# Patient Record
Sex: MALE | Race: WHITE | NOT HISPANIC OR LATINO | Employment: OTHER | URBAN - NONMETROPOLITAN AREA
[De-identification: names, ages, dates, MRNs, and addresses within clinical notes are randomized per-mention and may not be internally consistent; named-entity substitution may affect disease eponyms.]

---

## 2017-01-23 ENCOUNTER — AMBULATORY - GICH (OUTPATIENT)
Dept: INTERNAL MEDICINE | Facility: OTHER | Age: 67
End: 2017-01-23

## 2017-01-23 ENCOUNTER — HISTORY (OUTPATIENT)
Dept: INTERNAL MEDICINE | Facility: OTHER | Age: 67
End: 2017-01-23

## 2017-01-23 DIAGNOSIS — Z01.818 ENCOUNTER FOR OTHER PREPROCEDURAL EXAMINATION: ICD-10-CM

## 2017-01-23 DIAGNOSIS — R97.20 ELEVATED PROSTATE SPECIFIC ANTIGEN (PSA): ICD-10-CM

## 2017-01-23 DIAGNOSIS — M25.561 PAIN IN RIGHT KNEE: ICD-10-CM

## 2017-01-23 DIAGNOSIS — M25.562 PAIN IN LEFT KNEE: ICD-10-CM

## 2017-01-23 DIAGNOSIS — F17.200 NICOTINE DEPENDENCE, UNCOMPLICATED: ICD-10-CM

## 2017-01-23 LAB
ABSOLUTE BASOPHILS - HISTORICAL: 0.1 THOU/CU MM
ABSOLUTE EOSINOPHILS - HISTORICAL: 0.1 THOU/CU MM
ABSOLUTE LYMPHOCYTES - HISTORICAL: 1.6 THOU/CU MM (ref 0.9–2.9)
ABSOLUTE MONOCYTES - HISTORICAL: 0.5 THOU/CU MM
ABSOLUTE NEUTROPHILS - HISTORICAL: 2.4 THOU/CU MM (ref 1.7–7)
ANION GAP - HISTORICAL: 9 (ref 5–18)
BACTERIA URINE: NORMAL BACTERIA/HPF
BASOPHILS # BLD AUTO: 1.5 %
BILIRUB UR QL: NEGATIVE
BUN SERPL-MCNC: 20 MG/DL (ref 7–25)
BUN/CREAT RATIO - HISTORICAL: 16
CALCIUM SERPL-MCNC: 9.3 MG/DL (ref 8.6–10.3)
CHLORIDE SERPLBLD-SCNC: 106 MMOL/L (ref 98–107)
CLARITY, URINE: CLEAR CLARITY
CO2 SERPL-SCNC: 26 MMOL/L (ref 21–31)
COLOR UR: YELLOW COLOR
CREAT SERPL-MCNC: 1.22 MG/DL (ref 0.7–1.3)
EOSINOPHIL NFR BLD AUTO: 2.8 %
EPITHELIAL CELLS: NORMAL EPI/HPF
ERYTHROCYTE [DISTWIDTH] IN BLOOD BY AUTOMATED COUNT: 11.4 % (ref 11.5–15.5)
GFR IF NOT AFRICAN AMERICAN - HISTORICAL: 59 ML/MIN/1.73M2
GLUCOSE SERPL-MCNC: 92 MG/DL (ref 70–105)
GLUCOSE URINE: NEGATIVE MG/DL
HCT VFR BLD AUTO: 49.1 % (ref 37–53)
HEMOGLOBIN: 16.5 G/DL (ref 13.5–17.5)
INR - HISTORICAL: 1
KETONES UR QL: NEGATIVE MG/DL
LEUKOCYTE ESTERASE URINE: NEGATIVE
LYMPHOCYTES NFR BLD AUTO: 34.5 % (ref 20–44)
MCH RBC QN AUTO: 31.3 PG (ref 26–34)
MCHC RBC AUTO-ENTMCNC: 33.6 G/DL (ref 32–36)
MCV RBC AUTO: 93 FL (ref 80–100)
MONOCYTES NFR BLD AUTO: 10.7 %
NEUTROPHILS NFR BLD AUTO: 50.5 % (ref 42–72)
NITRITE UR QL STRIP: NEGATIVE
OCCULT BLOOD,URINE - HISTORICAL: ABNORMAL
PH UR: 5.5 [PH]
PLATELET # BLD AUTO: 228 THOU/CU MM (ref 140–440)
PMV BLD: 7 FL (ref 6.5–11)
POTASSIUM SERPL-SCNC: 4.4 MMOL/L (ref 3.5–5.1)
PROTEIN QUALITATIVE,URINE - HISTORICAL: NEGATIVE MG/DL
PROTIME - HISTORICAL: 10.5 SEC (ref 11.9–15.2)
RBC - HISTORICAL: NORMAL /HPF
RED BLOOD COUNT - HISTORICAL: 5.28 MIL/CU MM (ref 4.3–5.9)
SODIUM SERPL-SCNC: 141 MMOL/L (ref 133–143)
SP GR UR STRIP: 1.01
UROBILINOGEN,QUALITATIVE - HISTORICAL: NORMAL EU/DL
WBC - HISTORICAL: NORMAL /HPF
WHITE BLOOD COUNT - HISTORICAL: 4.7 THOU/CU MM (ref 4.5–11)

## 2017-02-16 ENCOUNTER — AMBULATORY - GICH (OUTPATIENT)
Dept: SCHEDULING | Facility: OTHER | Age: 67
End: 2017-02-16

## 2017-02-18 ENCOUNTER — AMBULATORY - GICH (OUTPATIENT)
Dept: SCHEDULING | Facility: OTHER | Age: 67
End: 2017-02-18

## 2017-02-26 ENCOUNTER — HISTORY (OUTPATIENT)
Dept: EMERGENCY MEDICINE | Facility: OTHER | Age: 67
End: 2017-02-26

## 2017-05-31 ENCOUNTER — HISTORY (OUTPATIENT)
Dept: FAMILY MEDICINE | Facility: OTHER | Age: 67
End: 2017-05-31

## 2017-05-31 ENCOUNTER — OFFICE VISIT - GICH (OUTPATIENT)
Dept: FAMILY MEDICINE | Facility: OTHER | Age: 67
End: 2017-05-31
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
## 2017-05-31 ASSESSMENT — PATIENT HEALTH QUESTIONNAIRE - PHQ9: SUM OF ALL RESPONSES TO PHQ QUESTIONS 1-9: 0

## 2017-10-18 ENCOUNTER — HISTORY (OUTPATIENT)
Dept: MEDSURG UNIT | Facility: OTHER | Age: 67
End: 2017-10-18

## 2017-10-20 ENCOUNTER — HISTORY (OUTPATIENT)
Dept: MEDSURG UNIT | Facility: OTHER | Age: 67
End: 2017-10-20

## 2017-11-08 ENCOUNTER — HISTORY (OUTPATIENT)
Dept: INTERNAL MEDICINE | Facility: OTHER | Age: 67
End: 2017-11-08

## 2017-11-08 ENCOUNTER — OFFICE VISIT - GICH (OUTPATIENT)
Dept: INTERNAL MEDICINE | Facility: OTHER | Age: 67
End: 2017-11-08

## 2017-11-08 DIAGNOSIS — Z98.890 OTHER SPECIFIED POSTPROCEDURAL STATES: ICD-10-CM

## 2017-11-08 DIAGNOSIS — N41.9 INFLAMMATORY DISEASE OF PROSTATE: ICD-10-CM

## 2017-11-08 DIAGNOSIS — A41.51 SEPSIS DUE TO ESCHERICHIA COLI (E. COLI) (H): ICD-10-CM

## 2017-11-08 ASSESSMENT — PATIENT HEALTH QUESTIONNAIRE - PHQ9: SUM OF ALL RESPONSES TO PHQ QUESTIONS 1-9: 0

## 2017-12-28 NOTE — PROGRESS NOTES
Patient Information     Patient Name MRN Sex Ammon Marshall [id number] Male 1950      Progress Notes by Chemo Ramos MD at 2017 11:00 AM     Author:  Chemo Ramos MD Service:  (none) Author Type:  Physician     Filed:  2017 11:23 AM Encounter Date:  2017 Status:  Signed     :  Chemo Ramos MD (Physician)            SUBJECTIVE:    Ammon Harding is a 66 y.o. male who presents for recheck after hospitalization.    HPI Comments: He is here today for hospital follow-up. He had an elevated PSA and [facility] Fortunately, the biopsy was negative for tumor. Following the biopsy he developed riders with a temperature up to 104. He was evaluated in the emergency room here and was deemed septic. He was admitted to the hospital and started on IV antibiotics. He had urine and blood cultures that were positive for a pansensitive Escherichia coli. He was resuscitated and ultimately discharged on Levaquin for another 6 days. He is here today for follow-up. His urine at discharge showed some red cells but no evidence for infection. His CBC and his basic metabolic panel at discharge basically returned to normal as well. His other medications were unchanged. The antibiotic is now done. He feels great and he has no complaints or concerns. He does not need anything further done in regards to prostate at this time. He is here today for a check to make sure that everything is okay and nothing further needs to be done.      No Known Allergies,   Current Outpatient Prescriptions     Medication  Sig     acetaminophen (TYLENOL EXTRA STRGTH) 500 mg tablet Take 1,000 mg by mouth every 6 hours if needed. Max acetaminophen dose: 4000mg in 24 hrs.     ibuprofen (ADVIL; MOTRIN) 800 mg tablet Take 800 mg by mouth once daily. Indications: Pain, Do not take more 2 tablets in 24 hours     losartan (COZAAR) 12.5 mg as half tablet Take 12.5 mg by mouth once daily.     sodium  "chloride (OCEAN NASAL SPRAY) 0.65 % nasal solution Inhale 2 Sprays in the nostril(s) 3 times daily.     No current facility-administered medications for this visit.      Medications have been reviewed by me and are current to the best of my knowledge and ability. ,   Past Medical History:     Diagnosis  Date     Elevated PSA 2106    psa 5.1      Knee pain    ,   Patient Active Problem List       Diagnosis  Date Noted     Sepsis (HC)  10/18/2017     Prostate infection  10/18/2017     History of prostate biopsy  10/18/2017     Elevated PSA       psa 5.1        Tinnitus  08/31/2015     Low back pain with sciatica  01/08/2014     KNEE PAIN  04/24/2012     TOBACCO USER       GANGLION CYST       left foot        ,   Past Surgical History:      Procedure  Laterality Date     [facility]       KNEE ARTHROSCOPY  5/2012    Left       PROSTATE BIOPSY  10/2017    Negative       TONSILLECTOMY       VASECTOMY      and   Social History        Substance Use Topics          Smoking status:   Current Every Day Smoker      Types:  Pipe      Smokeless tobacco:   Never Used       Comment: refused       Alcohol use   Yes      Comment: Occasional         REVIEW OF SYSTEMS:  Review of Systems   All other systems reviewed and are negative.      OBJECTIVE:  /88  Pulse 72  Ht 1.715 m (5' 7.5\")  Wt 90.7 kg (200 lb)  BMI 30.86 kg/m2    EXAM:   Physical Exam   Constitutional: He is well-developed, well-nourished, and in no distress. No distress.   Neck: Normal range of motion. Neck supple.   Cardiovascular: Normal rate, regular rhythm and normal heart sounds.  Exam reveals no gallop and no friction rub.    No murmur heard.  Pulmonary/Chest: Effort normal and breath sounds normal. No respiratory distress. He has no wheezes. He has no rales.   Neurological: He is alert.   Skin: Skin is warm and dry. He is not diaphoretic.   Psychiatric: Affect normal.   Nursing note and vitals reviewed.      ASSESSMENT/PLAN:    ICD-10-CM  "   1. Sepsis due to Escherichia coli (HC) A41.51    2. Prostate infection N41.9    3. History of prostate biopsy Z98.890         Plan:  He appears to be back to baseline at this point in time. I don't think that further lab needs to be done as he is feeling absolutely fine and his lab prior to discharge was acceptable. Current medications will continue without change. He will follow-up on an as-needed basis. [facility] will [facility] through the [facility].

## 2017-12-30 NOTE — NURSING NOTE
Patient Information     Patient Name MRAmmon Cheney [id number] Male 1950      Nursing Note by Marleen Shine LPN at 2017 11:00 AM     Author:  Marleen Shine LPN Service:  (none) Author Type:  NURS- Licensed Practical Nurse     Filed:  2017 11:09 AM Encounter Date:  2017 Status:  Signed     :  Marleen Shine LPN (NURS- Licensed Practical Nurse)            The patient is here today to be seen for a hospital follow up.  Marleen Shine LPN......2017  10:53 AM

## 2018-01-03 NOTE — H&P
Patient Information     Patient Name MRN Ammon Brewster [id number] Male 1950      H&P by Chemo Ramos MD at 2017 10:00 AM     Author:  Chemo Ramos MD Service:  (none) Author Type:  Physician     Filed:  2017 11:08 AM Encounter Date:  2017 Status:  Signed     :  Chemo Ramos MD (Physician)            SUBJECTIVE:    Ammon Harding is a 66 y.o. male who presents for preop consultation and clearance.    HPI Comments: This patient is here today for preoperative consultation and clearance is requested by Dr. [place]. He will be having a right total knee replacement done on  due to persistent and progressive osteoarthritis. This will be done at [facility] in [place].    The patient's past medical history is really quite benign and is reviewed below. He has never had any significant surgeries in the past but he has also never had any anesthesia complications. He has never had a blood transfusion. He does not have a history of any bleeding diathesis. He does not have sleep apnea.    The patient remains quite active and does not have any cardiac or pulmonary symptoms. He has no other complaints or concerns and this includes no acute illness or problem. His only current medications include ibuprofen generally once daily for his osteoarthritis as well as a nasal [facility]. He doesn't remember the name of the nasal spray. He does have a history of a mildly elevated PSA but the last check on this at the end of 2016 revealed that it had decreased to 3.7 after being 5.1 [facility]. He will need to have this rechecked again in April.      No Known Allergies,   Current Outpatient Prescriptions     Medication  Sig     acetaZOLAMIDE (DIAMOX) 250 mg tablet Take 1 tablet by mouth 2 times daily. Start 2 days prior to altitude     ibuprofen (ADVIL; MOTRIN) 800 mg tablet Take 1 tablet by mouth every 8 hours if needed.     No  current facility-administered medications for this visit.      Medications have been reviewed by me and are current to the best of my knowledge and ability. ,   Past Medical History      Diagnosis   Date     Elevated PSA       psa 5.1      Knee pain     ,   Patient Active Problem List       Diagnosis  Date Noted     Elevated PSA       psa 5.1        Tinnitus  2015     Low back pain with sciatica  2014     KNEE PAIN  2012     TOBACCO USER       GANGLION CYST       left foot        ,   Past Surgical History       Procedure   Laterality Date     Knee arthroscopy   2012     Left       Tonsillectomy        Vasectomy        [facility]      [facility]   Social History        Substance Use Topics          Smoking status:   Current Every Day Smoker      Types:  Pipe      Smokeless tobacco:   Never Used      Alcohol use   Yes      Comment: Occasional       Family Status     Relation  Status     Father      Mother Alive     Brother Alive     Other      Social History     Social History        Marital status:       Spouse name: N/A     Number of children:  N/A     Years of education:  N/A     Social History Main Topics          Smoking status:   Current Every Day Smoker      Types:  Pipe      Smokeless tobacco:   Never Used      Alcohol use   Yes      Comment: Occasional       Drug use:   No      Sexual activity:   Not Asked      Other Topics  Concern     None      Social History Narrative     He is .  He was  twice and .  He is a pipe smoker with occasional use of alcohol.  He was in the [employer] and was stationed a year in [place] and a year in [place].  He has been a .  He did go to [employer] school.  Retired from [employer]. Lives in [place].       REVIEW OF SYSTEMS:  Review of Systems   Constitutional: Negative for chills, diaphoresis, fever, malaise/fatigue and weight loss.   HENT: Negative for congestion, ear pain, nosebleeds, sore  "throat and tinnitus.    Eyes: Negative for blurred vision, double vision, photophobia, pain, discharge and redness.   Respiratory: Negative for cough, hemoptysis, sputum production, shortness of breath and wheezing.    Cardiovascular: Negative for chest pain, palpitations, orthopnea, claudication, leg swelling and PND.   Gastrointestinal: Negative for abdominal pain, blood in stool, constipation, diarrhea, heartburn, nausea and vomiting.   Genitourinary: Negative for dysuria, flank pain and hematuria.   Musculoskeletal: Negative for back pain, joint pain, myalgias and neck pain.   Skin: Negative for itching and rash.   Neurological: Negative for dizziness, tingling, tremors, speech change, loss of consciousness, weakness and headaches.   Psychiatric/Behavioral: Negative for depression, hallucinations, memory loss, substance abuse and suicidal ideas. The patient is not nervous/anxious.        OBJECTIVE:  /88  Pulse 64  Ht 1.715 m (5' 7.5\")  Wt 94.3 kg (207 lb 12.8 oz)  SpO2 94%  BMI 32.07 kg/m2    EXAM:   Physical Exam   Constitutional: He is oriented to person, place, and time and well-developed, well-nourished, and in no distress. No distress.   HENT:   Head: Normocephalic and atraumatic.   Right Ear: Tympanic membrane and external ear normal.   Left Ear: Tympanic membrane and external ear normal.   Nose: Nose normal.   Mouth/Throat: Oropharynx is clear and moist and mucous membranes are normal. No oropharyngeal exudate.   Eyes: Conjunctivae are normal. Pupils are equal, round, and reactive to light. No scleral icterus.   Neck: Normal range of motion. Neck supple. Normal carotid pulses, no hepatojugular reflux and no JVD present. Carotid bruit is not present. No tracheal deviation and no edema present. No thyroid mass and no thyromegaly present.   Cardiovascular: Normal rate, regular rhythm, normal heart sounds and intact distal pulses.  Exam reveals no gallop and no friction rub.    No murmur " heard.  Pulmonary/Chest: Effort normal and breath sounds normal. No respiratory distress. He has no decreased breath sounds. He has no wheezes. He has no rhonchi. He has no rales. He exhibits no tenderness.   Abdominal: Soft. Bowel sounds are normal. He exhibits no distension and no mass. There is no hepatosplenomegaly. There is no tenderness. There is no rebound and no guarding. Hernia confirmed negative in the right inguinal area and confirmed negative in the left inguinal area.   Musculoskeletal: Normal range of motion. He exhibits no edema or tenderness.   Lymphadenopathy:     He has no cervical adenopathy.   Neurological: He is alert and oriented to person, place, and time. He has normal motor skills. He displays normal reflexes. No cranial nerve deficit. He exhibits normal muscle tone. Gait normal. Coordination normal.   Skin: Skin is warm and dry. No rash noted. He is not diaphoretic. No cyanosis or erythema. No pallor. Nails show no clubbing.   Psychiatric: Mood, memory, affect and judgment normal.   Nursing note and vitals reviewed.    Results for orders placed or performed in visit on 01/23/17      BASIC METABOLIC PANEL      Result  Value Ref Range    SODIUM 141 133 - 143 mmol/L    POTASSIUM 4.4 3.5 - 5.1 mmol/L    CHLORIDE 106 98 - 107 mmol/L    CO2,TOTAL 26 21 - 31 mmol/L    ANION GAP 9 5 - 18                    GLUCOSE 92 70 - 105 mg/dL    CALCIUM 9.3 8.6 - 10.3 mg/dL    BUN 20 7 - 25 mg/dL    CREATININE 1.22 0.70 - 1.30 mg/dL    BUN/CREAT RATIO           16                    GFR if African American >60 >60 ml/min/1.73m2    GFR if not African American 59 (L) >60 ml/min/1.73m2   PROTIME-INR      Result  Value Ref Range    INR 1.0 <1.3    PROTIME 10.5 (L) 11.9 - 15.2 sec   CBC WITH AUTO DIFFERENTIAL      Result  Value Ref Range    WHITE BLOOD COUNT         4.7 4.5 - 11.0 thou/cu mm    RED BLOOD COUNT           5.28 4.30 - 5.90 mil/cu mm    HEMOGLOBIN                16.5 13.5 - 17.5 g/dL    HEMATOCRIT                 49.1 37.0 - 53.0 %    MCV                       93 80 - 100 fL    MCH                       31.3 26.0 - 34.0 pg    MCHC                      33.6 32.0 - 36.0 g/dL    RDW                       11.4 (L) 11.5 - 15.5 %    PLATELET COUNT            228 140 - 440 thou/cu mm    MPV                       7.0 6.5 - 11.0 fL    NEUTROPHILS               50.5 42.0 - 72.0 %    LYMPHOCYTES               34.5 20.0 - 44.0 %    MONOCYTES                 10.7 <12.0 %    EOSINOPHILS               2.8 <8.0 %    BASOPHILS                 1.5 <3.0 %    ABSOLUTE NEUTROPHILS      2.4 1.7 - 7.0 thou/cu mm    ABSOLUTE LYMPHOCYTES      1.6 0.9 - 2.9 thou/cu mm    ABSOLUTE MONOCYTES        0.5 <0.9 thou/cu mm    ABSOLUTE EOSINOPHILS      0.1 <0.5 thou/cu mm    ABSOLUTE BASOPHILS        0.1 <0.3 thou/cu mm   URINALYSIS W REFLEX MICROSCOPIC IF POSITIVE      Result  Value Ref Range    COLOR                     Yellow Yellow Color    CLARITY                   Clear Clear Clarity    SPECIFIC GRAVITY,URINE    1.010 1.010, 1.015, 1.020, 1.025                    PH,URINE                  5.5 6.0, 7.0, 8.0, 5.5, 6.5, 7.5, 8.5                    UROBILINOGEN,QUALITATIVE  Normal Normal EU/dl    PROTEIN, URINE Negative Negative mg/dL    GLUCOSE, URINE Negative Negative mg/dL    KETONES,URINE             Negative Negative mg/dL    BILIRUBIN,URINE           Negative Negative                    OCCULT BLOOD,URINE        Trace (A) Negative                    NITRITE                   Negative Negative                    LEUKOCYTE ESTERASE        Negative Negative                   URINALYSIS MICROSCOPIC      Result  Value Ref Range    RBC None Seen 0-2, None Seen /HPF    WBC None Seen 0-2, 3-5, None Seen /HPF    BACTERIA                  None Seen None Seen, Rare, Occasional, Few Bacteria/HPF    EPITHELIAL CELLS          None Seen None Seen, Few Epi/HPF     his EKG showed a sinus rhythm with a right bundle branch block. No previous for  comparison.    ASSESSMENT/PLAN:    ICD-10-CM    1. Arthralgia of both lower legs M25.561      M25.562    2. TOBACCO USER F17.200    3. Elevated PSA R97.20    4. Preop examination Z01.818 EKG 12 LEAD UNIT PERFORMED      BASIC METABOLIC PANEL      CBC AND DIFFERENTIAL      URINALYSIS W REFLEX MICROSCOPIC IF POSITIVE      PROTIME-INR      WV ELECTROCARDIOGRAM TRACING      BASIC METABOLIC PANEL      CBC AND DIFFERENTIAL      PROTIME-INR      CBC WITH AUTO DIFFERENTIAL      URINALYSIS W REFLEX MICROSCOPIC IF POSITIVE      URINALYSIS MICROSCOPIC      URINALYSIS MICROSCOPIC        Plan:  His exam today is normal. Lab is acceptable. EKG shows a sinus rhythm with a right bundle branch block, otherwise normal. No previous to compare to. This patient is okay for his planned surgical procedure without contraindication. I did ask him to avoid all aspirin and anti-inflammatory medications for 1 week prior to the procedure.

## 2018-01-03 NOTE — PROGRESS NOTES
Patient Information     Patient Name MRN Ammon Brewster [id number] Male 1950      Progress Notes by Chemo Ramos MD at 2017 10:00 AM     Author:  Chemo Ramos MD Service:  (none) Author Type:  Physician     Filed:  2017 11:08 AM Encounter Date:  2017 Status:  Signed     :  Chemo Ramos MD (Physician)            SUBJECTIVE:    Ammon Harding is a 66 y.o. male who presents for preop consultation and clearance.    HPI Comments: This patient is here today for preoperative consultation and clearance is requested by Dr. Prescott. He will be having a right total knee replacement done on  due to persistent and progressive osteoarthritis. This will be done at [facility] in [place].    The patient's past medical history is really quite benign and is reviewed below. He has never had any significant surgeries in the past but he has also never had any anesthesia complications. He has never had a blood transfusion. He does not have a history of any bleeding diathesis. He does not have sleep apnea.    The patient remains quite active and does not have any cardiac or pulmonary symptoms. He has no other complaints or concerns and this includes no acute illness or problem. His only current medications include ibuprofen generally once daily for his osteoarthritis as well as [facility] He doesn't remember the name of the nasal spray. He does have a history of a mildly elevated PSA but the last check on this at the end of 2016 revealed that it had decreased to 3.7 after [facility]. He will need to have this rechecked again in April.      No Known Allergies,   Current Outpatient Prescriptions     Medication  Sig     acetaZOLAMIDE (DIAMOX) 250 mg tablet Take 1 tablet by mouth 2 times daily. Start 2 days prior to altitude     ibuprofen (ADVIL; MOTRIN) 800 mg tablet Take 1 tablet by mouth every 8 hours if  needed.     No current facility-administered medications for this visit.      Medications have been reviewed by me and are current to the best of my knowledge and ability. ,   Past Medical History      Diagnosis   Date     Elevated PSA       psa 5.1      Knee pain     ,   Patient Active Problem List       Diagnosis  Date Noted     Elevated PSA       psa 5.1        Tinnitus  2015     Low back pain with sciatica  2014     KNEE PAIN  2012     TOBACCO USER       GANGLION CYST       left foot        ,   Past Surgical History       Procedure   Laterality Date     Knee arthroscopy   2012     Left       Tonsillectomy        Vasectomy        [facility]   Social History        Substance Use Topics          Smoking status:   Current Every Day Smoker      Types:  Pipe      Smokeless tobacco:   Never Used      Alcohol use   Yes      Comment: Occasional       Family Status     Relation  Status     Father      Mother Alive     Brother Alive     Other      Social History     Social History        Marital status:       Spouse name: N/A     Number of children:  N/A     Years of education:  N/A     Social History Main Topics          Smoking status:   Current Every Day Smoker      Types:  Pipe      Smokeless tobacco:   Never Used      Alcohol use   Yes      Comment: Occasional       Drug use:   No      Sexual activity:   Not Asked      Other Topics  Concern     None      Social History Narrative     He is .  He was  twice and .  He is a pipe smoker with occasional use of alcohol.  He was in the [employer] and was stationed a year in [employer] and a year in [employer].  He has been a .  He did go to [employer] school.  Retired from [employer]. Lives in [place].       REVIEW OF SYSTEMS:  Review of Systems   Constitutional: Negative for chills, diaphoresis, fever, malaise/fatigue and weight loss.   HENT: Negative for congestion, ear pain,  "nosebleeds, sore throat and tinnitus.    Eyes: Negative for blurred vision, double vision, photophobia, pain, discharge and redness.   Respiratory: Negative for cough, hemoptysis, sputum production, shortness of breath and wheezing.    Cardiovascular: Negative for chest pain, palpitations, orthopnea, claudication, leg swelling and PND.   Gastrointestinal: Negative for abdominal pain, blood in stool, constipation, diarrhea, heartburn, nausea and vomiting.   Genitourinary: Negative for dysuria, flank pain and hematuria.   Musculoskeletal: Negative for back pain, joint pain, myalgias and neck pain.   Skin: Negative for itching and rash.   Neurological: Negative for dizziness, tingling, tremors, speech change, loss of consciousness, weakness and headaches.   Psychiatric/Behavioral: Negative for depression, hallucinations, memory loss, substance abuse and suicidal ideas. The patient is not nervous/anxious.        OBJECTIVE:  /88  Pulse 64  Ht 1.715 m (5' 7.5\")  Wt 94.3 kg (207 lb 12.8 oz)  SpO2 94%  BMI 32.07 kg/m2    EXAM:   Physical Exam   Constitutional: He is oriented to person, place, and time and well-developed, well-nourished, and in no distress. No distress.   HENT:   Head: Normocephalic and atraumatic.   Right Ear: Tympanic membrane and external ear normal.   Left Ear: Tympanic membrane and external ear normal.   Nose: Nose normal.   Mouth/Throat: Oropharynx is clear and moist and mucous membranes are normal. No oropharyngeal exudate.   Eyes: Conjunctivae are normal. Pupils are equal, round, and reactive to light. No scleral icterus.   Neck: Normal range of motion. Neck supple. Normal carotid pulses, no hepatojugular reflux and no JVD present. Carotid bruit is not present. No tracheal deviation and no edema present. No thyroid mass and no thyromegaly present.   Cardiovascular: Normal rate, regular rhythm, normal heart sounds and intact distal pulses.  Exam reveals no gallop and no friction rub.    No " murmur heard.  Pulmonary/Chest: Effort normal and breath sounds normal. No respiratory distress. He has no decreased breath sounds. He has no wheezes. He has no rhonchi. He has no rales. He exhibits no tenderness.   Abdominal: Soft. Bowel sounds are normal. He exhibits no distension and no mass. There is no hepatosplenomegaly. There is no tenderness. There is no rebound and no guarding. Hernia confirmed negative in the right inguinal area and confirmed negative in the left inguinal area.   Musculoskeletal: Normal range of motion. He exhibits no edema or tenderness.   Lymphadenopathy:     He has no cervical adenopathy.   Neurological: He is alert and oriented to person, place, and time. He has normal motor skills. He displays normal reflexes. No cranial nerve deficit. He exhibits normal muscle tone. Gait normal. Coordination normal.   Skin: Skin is warm and dry. No rash noted. He is not diaphoretic. No cyanosis or erythema. No pallor. Nails show no clubbing.   Psychiatric: Mood, memory, affect and judgment normal.   Nursing note and vitals reviewed.    Results for orders placed or performed in visit on 01/23/17      BASIC METABOLIC PANEL      Result  Value Ref Range    SODIUM 141 133 - 143 mmol/L    POTASSIUM 4.4 3.5 - 5.1 mmol/L    CHLORIDE 106 98 - 107 mmol/L    CO2,TOTAL 26 21 - 31 mmol/L    ANION GAP 9 5 - 18                    GLUCOSE 92 70 - 105 mg/dL    CALCIUM 9.3 8.6 - 10.3 mg/dL    BUN 20 7 - 25 mg/dL    CREATININE 1.22 0.70 - 1.30 mg/dL    BUN/CREAT RATIO           16                    GFR if African American >60 >60 ml/min/1.73m2    GFR if not African American 59 (L) >60 ml/min/1.73m2   PROTIME-INR      Result  Value Ref Range    INR 1.0 <1.3    PROTIME 10.5 (L) 11.9 - 15.2 sec   CBC WITH AUTO DIFFERENTIAL      Result  Value Ref Range    WHITE BLOOD COUNT         4.7 4.5 - 11.0 thou/cu mm    RED BLOOD COUNT           5.28 4.30 - 5.90 mil/cu mm    HEMOGLOBIN                16.5 13.5 - 17.5 g/dL     HEMATOCRIT                49.1 37.0 - 53.0 %    MCV                       93 80 - 100 fL    MCH                       31.3 26.0 - 34.0 pg    MCHC                      33.6 32.0 - 36.0 g/dL    RDW                       11.4 (L) 11.5 - 15.5 %    PLATELET COUNT            228 140 - 440 thou/cu mm    MPV                       7.0 6.5 - 11.0 fL    NEUTROPHILS               50.5 42.0 - 72.0 %    LYMPHOCYTES               34.5 20.0 - 44.0 %    MONOCYTES                 10.7 <12.0 %    EOSINOPHILS               2.8 <8.0 %    BASOPHILS                 1.5 <3.0 %    ABSOLUTE NEUTROPHILS      2.4 1.7 - 7.0 thou/cu mm    ABSOLUTE LYMPHOCYTES      1.6 0.9 - 2.9 thou/cu mm    ABSOLUTE MONOCYTES        0.5 <0.9 thou/cu mm    ABSOLUTE EOSINOPHILS      0.1 <0.5 thou/cu mm    ABSOLUTE BASOPHILS        0.1 <0.3 thou/cu mm   URINALYSIS W REFLEX MICROSCOPIC IF POSITIVE      Result  Value Ref Range    COLOR                     Yellow Yellow Color    CLARITY                   Clear Clear Clarity    SPECIFIC GRAVITY,URINE    1.010 1.010, 1.015, 1.020, 1.025                    PH,URINE                  5.5 6.0, 7.0, 8.0, 5.5, 6.5, 7.5, 8.5                    UROBILINOGEN,QUALITATIVE  Normal Normal EU/dl    PROTEIN, URINE Negative Negative mg/dL    GLUCOSE, URINE Negative Negative mg/dL    KETONES,URINE             Negative Negative mg/dL    BILIRUBIN,URINE           Negative Negative                    OCCULT BLOOD,URINE        Trace (A) Negative                    NITRITE                   Negative Negative                    LEUKOCYTE ESTERASE        Negative Negative                   URINALYSIS MICROSCOPIC      Result  Value Ref Range    RBC None Seen 0-2, None Seen /HPF    WBC None Seen 0-2, 3-5, None Seen /HPF    BACTERIA                  None Seen None Seen, Rare, Occasional, Few Bacteria/HPF    EPITHELIAL CELLS          None Seen None Seen, Few Epi/HPF     his EKG showed a sinus rhythm with a right bundle branch block. No previous  for comparison.    ASSESSMENT/PLAN:    ICD-10-CM    1. Arthralgia of both lower legs M25.561      M25.562    2. TOBACCO USER F17.200    3. Elevated PSA R97.20    4. Preop examination Z01.818 EKG 12 LEAD UNIT PERFORMED      BASIC METABOLIC PANEL      CBC AND DIFFERENTIAL      URINALYSIS W REFLEX MICROSCOPIC IF POSITIVE      PROTIME-INR      MT ELECTROCARDIOGRAM TRACING      BASIC METABOLIC PANEL      CBC AND DIFFERENTIAL      PROTIME-INR      CBC WITH AUTO DIFFERENTIAL      URINALYSIS W REFLEX MICROSCOPIC IF POSITIVE      URINALYSIS MICROSCOPIC      URINALYSIS MICROSCOPIC        Plan:  His exam today is normal. Lab is acceptable. EKG shows a sinus rhythm with a right bundle branch block, otherwise normal. No previous to compare to. This patient is okay for his planned surgical procedure without contraindication. I did ask him to avoid all aspirin and anti-inflammatory medications for 1 week prior to the procedure.

## 2018-01-03 NOTE — NURSING NOTE
Patient Information     Patient Name MRN Ammon Brewster [id number] Male 1950      Nursing Note by Marleen Shine LPN at 2017 10:00 AM     Author:  Marleen Shine LPN Service:  (none) Author Type:  NURS- Licensed Practical Nurse     Filed:  2017 10:02 AM Encounter Date:  2017 Status:  Signed     :  Marleen Shine LPN (NURS- Licensed Practical Nurse)            This patient presents today for a Preoperative exam for this procedure: right total knee    Date of Surgery: 2017   Surgeon:  Dr. Prescott  Facility:  [facility]   Marleen Shine LPN..................2017  9:53 AM

## 2018-01-05 NOTE — PROGRESS NOTES
Patient Information     Patient Name MRN Sex Ammon Marshall [id number] Male 1950      Progress Notes by Arthur Stanton MD at 2017 10:15 AM     Author:  Arthur Stanton MD Service:  (none) Author Type:  Physician     Filed:  2017 11:19 AM Encounter Date:  2017 Status:  Signed     :  Arthur Stanton MD (Physician)            SUBJECTIVE:  Tick bite  Ammon Harding is a 66 y.o. male who presents for tick bite    HPI Comments: Patient presents with a tick bite to his left upper bicep. He denies any symptoms such as fevers chills no muscle or joint aches. He did identify it as a deer tick and believes it was on for less than 48 hours.      No Known Allergies,   Family History       Problem   Relation Age of Onset     Heart failure  Father      Dementia  Father      Other  Mother      Dementia       Diabetes  Brother      Heart Disease  Other      In distant relatives     ,   Current Outpatient Prescriptions on File Prior to Visit       Medication  Sig Dispense Refill     ibuprofen (ADVIL; MOTRIN) 800 mg tablet Take 1 tablet by mouth every 8 hours if needed.  0     No current facility-administered medications on file prior to visit.    ,   Past Surgical History:      Procedure  Laterality Date     [facility]       KNEE ARTHROSCOPY  2012    Left       TONSILLECTOMY       VASECTOMY     ,   Social History        Substance Use Topics          Smoking status:   Current Every Day Smoker      Types:  Pipe      Smokeless tobacco:   Never Used      Alcohol use   Yes      Comment: Occasional      and   Social History        Substance Use Topics          Smoking status:   Current Every Day Smoker      Types:  Pipe      Smokeless tobacco:   Never Used      Alcohol use   Yes      Comment: Occasional         REVIEW OF SYSTEMS:  ROS    OBJECTIVE:  /88  Pulse 64  Temp 98  F (36.7  C) (Temporal)  Wt 91 kg (200 lb 9.6 oz)  BMI 30.95 kg/m2    EXAM:   Physical Exam    Constitutional: He is well-developed, well-nourished, and in no distress.   Neurological: He is alert.   Skin: There is erythema (left upper extremity approximately 1 cm).       ASSESSMENT/PLAN:    ICD-10-CM    1. Tick bite, initial encounter W57.XXXA doxycycline (VIBRAMYCIN) 100 mg capsule        Plan:  Discussed options. Low probability of Lyme disease. Doxycycline. 2 tablets as a single dose. Follow-up

## 2018-01-05 NOTE — NURSING NOTE
Patient Information     Patient Name MRAmmon Cheney [id number] Male 1950      Nursing Note by Tracey Saxena at 2017 10:15 AM     Author:  Tracey Saxena Service:  (none) Author Type:  (none)     Filed:  2017 10:21 AM Encounter Date:  2017 Status:  Signed     :  Tracey Saxena            Patient here for a tick bite that he pulled off this morning on the left upper arm. Tracey Saxena LPN .......................2017  10:19 AM

## 2018-01-27 VITALS
HEART RATE: 72 BPM | DIASTOLIC BLOOD PRESSURE: 88 MMHG | SYSTOLIC BLOOD PRESSURE: 124 MMHG | HEIGHT: 68 IN | WEIGHT: 200 LBS | BODY MASS INDEX: 30.31 KG/M2

## 2018-01-27 VITALS
OXYGEN SATURATION: 94 % | SYSTOLIC BLOOD PRESSURE: 126 MMHG | DIASTOLIC BLOOD PRESSURE: 88 MMHG | HEART RATE: 64 BPM | BODY MASS INDEX: 31.49 KG/M2 | WEIGHT: 207.8 LBS | HEIGHT: 68 IN

## 2018-01-27 VITALS
TEMPERATURE: 98 F | WEIGHT: 200.6 LBS | DIASTOLIC BLOOD PRESSURE: 88 MMHG | SYSTOLIC BLOOD PRESSURE: 128 MMHG | BODY MASS INDEX: 30.95 KG/M2 | HEART RATE: 64 BPM

## 2018-02-03 ASSESSMENT — PATIENT HEALTH QUESTIONNAIRE - PHQ9
SUM OF ALL RESPONSES TO PHQ QUESTIONS 1-9: 0
SUM OF ALL RESPONSES TO PHQ QUESTIONS 1-9: 0

## 2018-02-16 ENCOUNTER — DOCUMENTATION ONLY (OUTPATIENT)
Dept: FAMILY MEDICINE | Facility: OTHER | Age: 68
End: 2018-02-16

## 2018-02-16 PROBLEM — R97.20 ELEVATED PSA: Status: ACTIVE | Noted: 2018-02-16

## 2018-02-16 PROBLEM — A41.9 SEPSIS (H): Status: ACTIVE | Noted: 2017-10-18

## 2018-02-16 PROBLEM — M67.40 GANGLION CYST: Status: ACTIVE | Noted: 2018-02-16

## 2018-02-16 PROBLEM — Z72.0 TOBACCO USER: Status: ACTIVE | Noted: 2018-02-16

## 2018-02-16 PROBLEM — N41.9 PROSTATE INFECTION: Status: ACTIVE | Noted: 2017-10-18

## 2018-02-16 PROBLEM — Z98.890 HISTORY OF PROSTATE BIOPSY: Status: ACTIVE | Noted: 2017-10-18

## 2018-02-16 RX ORDER — ACETAMINOPHEN 500 MG
1000 TABLET ORAL EVERY 6 HOURS PRN
COMMUNITY
End: 2018-12-21

## 2018-02-16 RX ORDER — LOSARTAN POTASSIUM 25 MG/1
12.5 TABLET ORAL DAILY
COMMUNITY
End: 2022-12-15 | Stop reason: ALTCHOICE

## 2018-02-16 RX ORDER — IBUPROFEN 800 MG/1
800 TABLET, FILM COATED ORAL DAILY
COMMUNITY
End: 2019-10-16

## 2018-05-01 ENCOUNTER — HOSPITAL ENCOUNTER (OUTPATIENT)
Dept: MRI IMAGING | Facility: OTHER | Age: 68
Discharge: HOME | End: 2018-05-01
Attending: NURSE PRACTITIONER | Admitting: NURSE PRACTITIONER
Payer: COMMERCIAL

## 2018-05-01 DIAGNOSIS — R97.20 ELEVATED PROSTATE SPECIFIC ANTIGEN (PSA): ICD-10-CM

## 2018-05-01 PROCEDURE — A9577 INJ MULTIHANCE: HCPCS | Performed by: RADIOLOGY

## 2018-05-01 PROCEDURE — 72197 MRI PELVIS W/O & W/DYE: CPT

## 2018-05-01 PROCEDURE — 25500064 ZZH RX 255 OP 636: Performed by: RADIOLOGY

## 2018-05-01 RX ADMIN — GADOBENATE DIMEGLUMINE 20 ML: 529 INJECTION, SOLUTION INTRAVENOUS at 11:00

## 2018-05-17 ENCOUNTER — TRANSFERRED RECORDS (OUTPATIENT)
Dept: HEALTH INFORMATION MANAGEMENT | Facility: OTHER | Age: 68
End: 2018-05-17

## 2018-07-23 ENCOUNTER — TRANSFERRED RECORDS (OUTPATIENT)
Dept: HEALTH INFORMATION MANAGEMENT | Facility: OTHER | Age: 68
End: 2018-07-23

## 2018-07-23 NOTE — PROGRESS NOTES
Patient Information     Patient Name  Ammon Harding MRN  [id number] Sex  Male   1950      Letter by Chemo Ramos MD at      Author:  Chemo Ramos MD Service:  (none) Author Type:  (none)    Filed:   Encounter Date:  2017 Status:  (Other)           Ammon Harding  [address] [facility] [id number]          2017    Dear Ammon,    Following are the tests completed during your last clinic visit:    Results for orders placed or performed in visit on 17      BASIC METABOLIC PANEL      Result  Value Ref Range    SODIUM 141 133 - 143 mmol/L    POTASSIUM 4.4 3.5 - 5.1 mmol/L    CHLORIDE 106 98 - 107 mmol/L    CO2,TOTAL 26 21 - 31 mmol/L    ANION GAP 9 5 - 18                    GLUCOSE 92 70 - 105 mg/dL    CALCIUM 9.3 8.6 - 10.3 mg/dL    BUN 20 7 - 25 mg/dL    CREATININE 1.22 0.70 - 1.30 mg/dL    BUN/CREAT RATIO           16                    GFR if African American >60 >60 ml/min/1.73m2    GFR if not African American 59 (L) >60 ml/min/1.73m2   PROTIME-INR      Result  Value Ref Range    INR 1.0 <1.3    PROTIME 10.5 (L) 11.9 - 15.2 sec   CBC WITH AUTO DIFFERENTIAL      Result  Value Ref Range    WHITE BLOOD COUNT         4.7 4.5 - 11.0 thou/cu mm    RED BLOOD COUNT           5.28 4.30 - 5.90 mil/cu mm    HEMOGLOBIN                16.5 13.5 - 17.5 g/dL    HEMATOCRIT                49.1 37.0 - 53.0 %    MCV                       93 80 - 100 fL    MCH                       31.3 26.0 - 34.0 pg    MCHC                      33.6 32.0 - 36.0 g/dL    RDW                       11.4 (L) 11.5 - 15.5 %    PLATELET COUNT            228 140 - 440 thou/cu mm    MPV                       7.0 6.5 - 11.0 fL    NEUTROPHILS               50.5 42.0 - 72.0 %    LYMPHOCYTES               34.5 20.0 - 44.0 %    MONOCYTES                 10.7 <12.0 %    EOSINOPHILS               2.8 <8.0 %    BASOPHILS                 1.5 <3.0 %    ABSOLUTE NEUTROPHILS      2.4 1.7 - 7.0 thou/cu mm    ABSOLUTE  LYMPHOCYTES      1.6 0.9 - 2.9 thou/cu mm    ABSOLUTE MONOCYTES        0.5 <0.9 thou/cu mm    ABSOLUTE EOSINOPHILS      0.1 <0.5 thou/cu mm    ABSOLUTE BASOPHILS        0.1 <0.3 thou/cu mm   URINALYSIS W REFLEX MICROSCOPIC IF POSITIVE      Result  Value Ref Range    COLOR                     Yellow Yellow Color    CLARITY                   Clear Clear Clarity    SPECIFIC GRAVITY,URINE    1.010 1.010, 1.015, 1.020, 1.025                    PH,URINE                  5.5 6.0, 7.0, 8.0, 5.5, 6.5, 7.5, 8.5                    UROBILINOGEN,QUALITATIVE  Normal Normal EU/dl    PROTEIN, URINE Negative Negative mg/dL    GLUCOSE, URINE Negative Negative mg/dL    KETONES,URINE             Negative Negative mg/dL    BILIRUBIN,URINE           Negative Negative                    OCCULT BLOOD,URINE        Trace (A) Negative                    NITRITE                   Negative Negative                    LEUKOCYTE ESTERASE        Negative Negative                   URINALYSIS MICROSCOPIC      Result  Value Ref Range    RBC None Seen 0-2, None Seen /HPF    WBC None Seen 0-2, 3-5, None Seen /HPF    BACTERIA                  None Seen None Seen, Rare, Occasional, Few Bacteria/HPF    EPITHELIAL CELLS          None Seen None Seen, Few Epi/HPF         Your blood and urine tests look fine. Congratulations on this report.    Sincerely,      Chemo Ramos MD  Internal Medicine  [facility]

## 2018-07-30 ENCOUNTER — OFFICE VISIT (OUTPATIENT)
Dept: FAMILY MEDICINE | Facility: OTHER | Age: 68
End: 2018-07-30
Attending: FAMILY MEDICINE
Payer: MEDICARE

## 2018-07-30 VITALS
HEIGHT: 67 IN | BODY MASS INDEX: 30.7 KG/M2 | SYSTOLIC BLOOD PRESSURE: 142 MMHG | WEIGHT: 195.6 LBS | OXYGEN SATURATION: 97 % | HEART RATE: 69 BPM | DIASTOLIC BLOOD PRESSURE: 78 MMHG

## 2018-07-30 DIAGNOSIS — Z01.818 PRE-OP EXAM: Primary | ICD-10-CM

## 2018-07-30 DIAGNOSIS — I10 BENIGN ESSENTIAL HYPERTENSION: ICD-10-CM

## 2018-07-30 DIAGNOSIS — M67.40 GANGLION CYST: ICD-10-CM

## 2018-07-30 LAB
ANION GAP SERPL CALCULATED.3IONS-SCNC: 4 MMOL/L (ref 3–14)
BUN SERPL-MCNC: 17 MG/DL (ref 7–25)
CALCIUM SERPL-MCNC: 9.7 MG/DL (ref 8.6–10.3)
CHLORIDE SERPL-SCNC: 106 MMOL/L (ref 98–107)
CO2 SERPL-SCNC: 30 MMOL/L (ref 21–31)
CREAT SERPL-MCNC: 1.3 MG/DL (ref 0.7–1.3)
GFR SERPL CREATININE-BSD FRML MDRD: 55 ML/MIN/1.7M2
GLUCOSE SERPL-MCNC: 106 MG/DL (ref 70–105)
POTASSIUM SERPL-SCNC: 4.4 MMOL/L (ref 3.5–5.1)
SODIUM SERPL-SCNC: 140 MMOL/L (ref 134–144)

## 2018-07-30 PROCEDURE — 36415 COLL VENOUS BLD VENIPUNCTURE: CPT | Performed by: FAMILY MEDICINE

## 2018-07-30 PROCEDURE — 99214 OFFICE O/P EST MOD 30 MIN: CPT | Mod: 25 | Performed by: FAMILY MEDICINE

## 2018-07-30 PROCEDURE — 93010 ELECTROCARDIOGRAM REPORT: CPT | Performed by: INTERNAL MEDICINE

## 2018-07-30 PROCEDURE — G0463 HOSPITAL OUTPT CLINIC VISIT: HCPCS | Mod: 25

## 2018-07-30 PROCEDURE — G0463 HOSPITAL OUTPT CLINIC VISIT: HCPCS

## 2018-07-30 PROCEDURE — 80048 BASIC METABOLIC PNL TOTAL CA: CPT | Performed by: FAMILY MEDICINE

## 2018-07-30 PROCEDURE — 93005 ELECTROCARDIOGRAM TRACING: CPT | Performed by: FAMILY MEDICINE

## 2018-07-30 ASSESSMENT — PAIN SCALES - GENERAL: PAINLEVEL: NO PAIN (0)

## 2018-07-30 NOTE — PROGRESS NOTES
----------------- PREOPERATIVE EXAM ------------------  7/30/2018    SUBJECTIVE:  Ammon Harding is a 67 year old male here for preoperative optimization.    I was asked to see Ammon Harding by Dr. Owen for preoperative eval    Date of Surgery: 08/06/18  Type of Surgery: toe ganglion  [facility]: [place]    HPI: Patient arrives here for a preop.  He will be undergoing surgery to a ganglion cyst involving his toe right second digit.      Fever/Chills or other infectious symptoms in past month:  no  >10lb weight loss in past two months:  no    Patient Active Problem List    Diagnosis Date Noted     Pre-op exam 07/30/2018     Priority: Medium     Elevated PSA 02/16/2018     Priority: Medium     Overview:   psa 5.1       Ganglion cyst 02/16/2018     Priority: Medium     Overview:   left foot       Tobacco user 02/16/2018     Priority: Medium     History of prostate biopsy 10/18/2017     Priority: Medium     Prostate infection 10/18/2017     Priority: Medium     Sepsis (H) 10/18/2017     Priority: Medium     Tinnitus 08/31/2015     Priority: Medium     Low back pain with sciatica 01/08/2014     Priority: Medium     Knee pain 04/24/2012     Priority: Medium       Past Medical History:   Diagnosis Date     Elevated prostate specific antigen (PSA)     2106,psa 5.1     Pain in knee     No Comments Provided       Past Surgical History:   Procedure Laterality Date     ARTHROSCOPY KNEE      5/2012,Left     [facility]      [facility]     OTHER SURGICAL HISTORY      10/2017,MNP602,PROSTATE BIOPSY,Negative     TONSILLECTOMY      No Comments Provided     VASECTOMY      No Comments Provided       Family History   Problem Relation Age of Onset     Heart Failure Father      Heart failure     Dementia Father      Dementia     Other - See Comments Mother      Dementia     Diabetes Brother      Diabetes     HEART DISEASE Other      Heart Disease,In distant relatives       Social History   Substance Use Topics     Smoking status:  "Current Every Day Smoker     Types: Pipe     Smokeless tobacco: Never Used      Comment: Quit smoking: refused     Alcohol use Yes      Comment: 14 a week        Current Outpatient Prescriptions   Medication Sig Dispense Refill     ibuprofen (ADVIL/MOTRIN) 800 MG tablet Take 800 mg by mouth daily       losartan (COZAAR) 25 MG tablet Take 12.5 mg by mouth daily       Saline (SODIUM CHLORIDE) 0.65 % SOLN Spray 2 sprays in nostril 3 times daily       acetaminophen (TYLENOL) 500 MG tablet Take 1,000 mg by mouth every 6 hours as needed         Allergies:  No Known Allergies    ROS:    Surgical:  patient denies previous complications from prior surgeries including but not limited to prolonged bleeding, anesthesia complications, dysrhythmias, surgical wound infections, or prolonged hospital stay.  Patient does report a prostatic right knee   Trouble waking up from a prostate bx    Denies family hx of bleeding tendencies, anesthesia complications, or other problems with surgery.     -------------------------------------------------------------    PHYSICAL EXAM:  /78 (BP Location: Right arm, Patient Position: Sitting)  Pulse 69  Ht 5' 6.5\" (1.689 m)  Wt 195 lb 9.6 oz (88.7 kg)  SpO2 97%  BMI 31.1 kg/m2    EXAM:  General Appearance: Pleasant, alert, appropriate appearance for age. No acute distress  Head Exam: Normal. Normocephalic, atraumatic.  Eyes: PERRL, EOMI  Ears: Normal TM's bilaterally. Normal auditory canals and external ears.   OroPharynx: Normal buccal mucosa. Normal pharynx.  Neck: Supple, no masses or nodes, no lymphadenopathy.  No thyromegaly.  Lungs: Normal chest wall and respirations. Clear to auscultation, no wheezes or crackles.  Cardiovascular: Regular rate and rhythm. S1, S2, no murmurs.  Gastrointestinal: Soft, nontender, no abnormal masses or organomegaly. BS normal   Musculoskeletal: No edema.  Skin: no concerning or new rashes.  Psychiatric Exam: Alert and oriented, appropriate affect.  His " right second toe over the DIP joint does show a typical ganglion.      EKG:  normal EKG, normal sinus rhythm  ---------------------------------------------------------------  LABS      ASSESSEMENT AND PLAN:    (Z01.818) Pre-op exam  (primary encounter diagnosis)      (M67.40) Ganglion cyst      PRE OP RECOMMENDATIONS:  Patient is on chronic pain medications no  Patient is on antiplatlet/anticoagulation medication no  Other medications that need adjustment perioperatively no    Other:  Patient was advised to call our office and the surgical services with any change in condition or new symptoms if they were to develop between today and their surgical date.  Especially any cardiopulmonary symptoms or symptoms concerning for an infection.    Arthur Stanton 7/30/2018

## 2018-07-30 NOTE — MR AVS SNAPSHOT
"              After Visit Summary   7/30/2018    Ammon Harding    MRN: [id number]           Patient Information     Date Of Birth          1950        Visit Information        Provider Department      7/30/2018 2:00 PM Arthur Stanton MD [facility]        Today's Diagnoses     Pre-op exam    -  1    Ganglion cyst        Benign essential hypertension           Follow-ups after your visit        Who to contact     If you have questions or need follow up information about today's clinic visit or your schedule please contact [facility] [place] directly at [phone].  Normal or non-critical lab and imaging results will be communicated to you by MyChart, letter or phone within 4 business days after the clinic has received the results. If you do not hear from us within 7 days, please contact the clinic through MyChart or phone. If you have a critical or abnormal lab result, we will notify you by phone as soon as possible.  Submit refill requests through [employer] or call your pharmacy and they will forward the refill request to us. Please allow 3 business days for your refill to be completed.          Additional Information About Your Visit        Care EveryWhere ID     This is your Care EveryWhere ID. This could be used by other organizations to access your [facility] medical records  [id number]        Your Vitals Were     Pulse Height Pulse Oximetry BMI (Body Mass Index)          69 5' 6.5\" (1.689 m) 97% 31.1 kg/m2         Blood Pressure from Last 3 Encounters:   07/30/18 142/78   11/08/17 124/88   05/31/17 128/88    Weight from Last 3 Encounters:   07/30/18 195 lb 9.6 oz (88.7 kg)   11/08/17 200 lb (90.7 kg)   05/31/17 200 lb 9.6 oz (91 kg)              We Performed the Following     Basic Metabolic Panel        Primary Care Provider Office Phone # Fax #    Chemo Ramos [phone] [id number]  [facility] [address]        Equal Access to " Services     [facility]: Hadii aad ku hadkaykaylatonia Estefaniashai, wabevda luqadaha, qaybta kaalmadago pendleton, dagoberto esteves . So [facility] [phone].    ATENCIÓN: Si habla español, tiene a ovalle disposición servicios gratuitos de asistencia lingüística. Llame al [phone].    We comply with applicable federal civil rights laws and [place] laws. We do not discriminate on the basis of race, color, national origin, age, disability, sex, sexual orientation, or gender identity.            Thank you!     Thank you for choosing [facility] AND [facility]  for your care. Our goal is always to provide you with excellent care. Hearing back from our patients is one way we can continue to improve our services. Please take a few minutes to complete the written survey that you may receive in the mail after your visit with us. Thank you!             Your Updated Medication List - Protect others around you: Learn how to safely use, store and throw away your medicines at www.disposemymeds.org.          This list is accurate as of 7/30/18 11:59 PM.  Always use your most recent med list.                   Brand Name Dispense Instructions for use Diagnosis    acetaminophen 500 MG tablet    TYLENOL     Take 1,000 mg by mouth every 6 hours as needed        ibuprofen 800 MG tablet    ADVIL/MOTRIN     Take 800 mg by mouth daily        losartan 25 MG tablet    COZAAR     Take 12.5 mg by mouth daily        Sodium Chloride 0.65 % Soln      Spray 2 sprays in nostril 3 times daily

## 2018-07-30 NOTE — LETTER
August 1, 2018      Ammon Harding  [address] [place] [address]        Dear ,    We are writing to inform you of your test results.    Your test results fall within the expected range(s) or remain unchanged from previous results.  Please continue with current treatment plan.    Resulted Orders   Basic Metabolic Panel   Result Value Ref Range    Sodium 140 134 - 144 mmol/L    Potassium 4.4 3.5 - 5.1 mmol/L    Chloride 106 98 - 107 mmol/L    Carbon Dioxide 30 21 - 31 mmol/L    Anion Gap 4 3 - 14 mmol/L    Glucose 106 (H) 70 - 105 mg/dL    Urea Nitrogen 17 7 - 25 mg/dL    Creatinine 1.30 0.70 - 1.30 mg/dL    GFR Estimate 55 (L) >60 mL/min/1.7m2    GFR Estimate If Black 66 >60 mL/min/1.7m2    Calcium 9.7 8.6 - 10.3 mg/dL       If you have any questions or concerns, please call the clinic at the number listed above.       Sincerely,        Arthur Stanton MD

## 2018-07-31 ASSESSMENT — PATIENT HEALTH QUESTIONNAIRE - PHQ9: SUM OF ALL RESPONSES TO PHQ QUESTIONS 1-9: 0

## 2018-12-21 ENCOUNTER — OFFICE VISIT (OUTPATIENT)
Dept: INTERNAL MEDICINE | Facility: OTHER | Age: 68
End: 2018-12-21
Attending: INTERNAL MEDICINE
Payer: MEDICARE

## 2018-12-21 VITALS
RESPIRATION RATE: 18 BRPM | BODY MASS INDEX: 31.8 KG/M2 | WEIGHT: 200 LBS | DIASTOLIC BLOOD PRESSURE: 96 MMHG | SYSTOLIC BLOOD PRESSURE: 148 MMHG | HEART RATE: 60 BPM

## 2018-12-21 DIAGNOSIS — M54.41 CHRONIC BILATERAL LOW BACK PAIN WITH BILATERAL SCIATICA: ICD-10-CM

## 2018-12-21 DIAGNOSIS — G89.29 CHRONIC BILATERAL LOW BACK PAIN WITH BILATERAL SCIATICA: ICD-10-CM

## 2018-12-21 DIAGNOSIS — M54.42 CHRONIC BILATERAL LOW BACK PAIN WITH BILATERAL SCIATICA: ICD-10-CM

## 2018-12-21 DIAGNOSIS — L30.9 ECZEMA, UNSPECIFIED TYPE: Primary | ICD-10-CM

## 2018-12-21 PROCEDURE — 99213 OFFICE O/P EST LOW 20 MIN: CPT | Performed by: INTERNAL MEDICINE

## 2018-12-21 PROCEDURE — G0463 HOSPITAL OUTPT CLINIC VISIT: HCPCS

## 2018-12-21 ASSESSMENT — PATIENT HEALTH QUESTIONNAIRE - PHQ9: SUM OF ALL RESPONSES TO PHQ QUESTIONS 1-9: 0

## 2018-12-21 ASSESSMENT — ENCOUNTER SYMPTOMS
CONSTITUTIONAL NEGATIVE: 1
EYES NEGATIVE: 1
ENDOCRINE NEGATIVE: 1
ALLERGIC/IMMUNOLOGIC NEGATIVE: 1

## 2018-12-21 NOTE — PROGRESS NOTES
Chief Complaint:  Rash.    HPI: He has had a rash present for the last month or 2.  It is dry and itchy.  He uses over-the-counter cortisone and it seems to help.  He wonders if it is ringworm or some other sort of infection.    He is also having low back pain again and would like to get another injection.  He has not had an MRI for 5 years so I told him he would need to have that done first.  The pain is in the low lumbar area without any significant radiation.    Medications reconciled.  Past medical history, past surgical history, family history and social history reviewed and updated.    Past Medical History:   Diagnosis Date     Elevated prostate specific antigen (PSA)     2106,psa 5.1     Hypertension      Osteoarthritis        Past Surgical History:   Procedure Laterality Date     ARTHROSCOPY KNEE Left 2012     [place]      [place]     JOINT REPLACEMENT Right 2017     PROSTATE BIOPSY, NEEDLE, SATURATION SAMPLING  2017     TONSILLECTOMY      No Comments Provided     VASECTOMY      No Comments Provided       No Known Allergies    Current Outpatient Medications   Medication Sig Dispense Refill     betamethasone valerate (VALISONE) 0.1 % external ointment Apply topically 2 times daily 30 g 1     ibuprofen (ADVIL/MOTRIN) 800 MG tablet Take 800 mg by mouth daily       losartan (COZAAR) 25 MG tablet Take 12.5 mg by mouth daily         Review of Systems   Constitutional: Negative.    Eyes: Negative.    Endocrine: Negative.    Allergic/Immunologic: Negative.        Physical Exam   Constitutional: He appears well-developed and well-nourished. No distress.   Skin: He is not diaphoretic.   He has eczematous patches present on his left hand bilateral elbows and lower leg.   Nursing note and vitals reviewed.      Assessment:        ICD-10-CM    1. Eczema, unspecified type L30.9 betamethasone valerate (VALISONE) 0.1 % external ointment   2. Chronic bilateral low back pain with bilateral sciatica M54.42 MR Lumbar Spine  w/o Contrast    M54.41     G89.29        Plan: Betamethasone for the eczema, notify if not improving.  MRI of the lumbar spine will be scheduled and we will consider injection if anatomy appears amenable to this.

## 2018-12-21 NOTE — NURSING NOTE
"The patient is here today to be seen for random rash spots on his body.  Marleen Shine on 12/21/2018 at 12:35 PM  Chief Complaint   Patient presents with     Derm Problem       Initial BP (!) 148/96 (BP Location: Right arm, Patient Position: Sitting, Cuff Size: Adult Regular)   Pulse 60   Resp 18   Wt 90.7 kg (200 lb)   BMI 31.80 kg/m   Estimated body mass index is 31.8 kg/m  as calculated from the following:    Height as of 7/30/18: 1.689 m (5' 6.5\").    Weight as of this encounter: 90.7 kg (200 lb).  Medication Reconciliation: complete    Marleen Shine    "

## 2019-01-02 ENCOUNTER — HOSPITAL ENCOUNTER (OUTPATIENT)
Dept: MRI IMAGING | Facility: OTHER | Age: 69
Discharge: HOME | End: 2019-01-02
Attending: INTERNAL MEDICINE | Admitting: INTERNAL MEDICINE
Payer: MEDICARE

## 2019-01-02 DIAGNOSIS — M54.42 CHRONIC BILATERAL LOW BACK PAIN WITH BILATERAL SCIATICA: Primary | ICD-10-CM

## 2019-01-02 DIAGNOSIS — M54.41 CHRONIC BILATERAL LOW BACK PAIN WITH BILATERAL SCIATICA: Primary | ICD-10-CM

## 2019-01-02 DIAGNOSIS — M54.42 CHRONIC BILATERAL LOW BACK PAIN WITH BILATERAL SCIATICA: ICD-10-CM

## 2019-01-02 DIAGNOSIS — G89.29 CHRONIC BILATERAL LOW BACK PAIN WITH BILATERAL SCIATICA: ICD-10-CM

## 2019-01-02 DIAGNOSIS — G89.29 CHRONIC BILATERAL LOW BACK PAIN WITH BILATERAL SCIATICA: Primary | ICD-10-CM

## 2019-01-02 DIAGNOSIS — M54.41 CHRONIC BILATERAL LOW BACK PAIN WITH BILATERAL SCIATICA: ICD-10-CM

## 2019-01-02 PROCEDURE — 72148 MRI LUMBAR SPINE W/O DYE: CPT

## 2019-01-07 ENCOUNTER — HOSPITAL ENCOUNTER (OUTPATIENT)
Dept: GENERAL RADIOLOGY | Facility: OTHER | Age: 69
Discharge: HOME | End: 2019-01-07
Attending: INTERNAL MEDICINE | Admitting: INTERNAL MEDICINE
Payer: MEDICARE

## 2019-01-07 DIAGNOSIS — G89.29 CHRONIC BILATERAL LOW BACK PAIN WITH BILATERAL SCIATICA: ICD-10-CM

## 2019-01-07 DIAGNOSIS — M54.42 CHRONIC BILATERAL LOW BACK PAIN WITH BILATERAL SCIATICA: ICD-10-CM

## 2019-01-07 DIAGNOSIS — M54.41 CHRONIC BILATERAL LOW BACK PAIN WITH BILATERAL SCIATICA: ICD-10-CM

## 2019-01-07 PROCEDURE — 62323 NJX INTERLAMINAR LMBR/SAC: CPT

## 2019-01-07 PROCEDURE — 25500064 ZZH RX 255 OP 636: Performed by: RADIOLOGY

## 2019-01-07 PROCEDURE — 25000125 ZZHC RX 250: Performed by: RADIOLOGY

## 2019-01-07 RX ORDER — METHYLPREDNISOLONE ACETATE 80 MG/ML
80 INJECTION, SUSPENSION INTRA-ARTICULAR; INTRALESIONAL; INTRAMUSCULAR; SOFT TISSUE ONCE
Status: COMPLETED | OUTPATIENT
Start: 2019-01-07 | End: 2019-01-07

## 2019-01-07 RX ORDER — LIDOCAINE HYDROCHLORIDE 10 MG/ML
2 INJECTION, SOLUTION EPIDURAL; INFILTRATION; INTRACAUDAL; PERINEURAL ONCE
Status: COMPLETED | OUTPATIENT
Start: 2019-01-07 | End: 2019-01-07

## 2019-01-07 RX ADMIN — LIDOCAINE HYDROCHLORIDE 2 ML: 10 INJECTION, SOLUTION INFILTRATION; PERINEURAL at 12:08

## 2019-01-07 RX ADMIN — Medication 2 ML: at 12:08

## 2019-01-07 RX ADMIN — LIDOCAINE HYDROCHLORIDE 2 ML: 10 INJECTION, SOLUTION EPIDURAL; INFILTRATION; INTRACAUDAL; PERINEURAL at 12:08

## 2019-01-07 RX ADMIN — METHYLPREDNISOLONE ACETATE 80 MG: 80 INJECTION, SUSPENSION INTRA-ARTICULAR; INTRALESIONAL; INTRAMUSCULAR; SOFT TISSUE at 12:08

## 2019-03-12 ENCOUNTER — OFFICE VISIT (OUTPATIENT)
Dept: INTERNAL MEDICINE | Facility: OTHER | Age: 69
End: 2019-03-12
Attending: INTERNAL MEDICINE
Payer: MEDICARE

## 2019-03-12 VITALS
DIASTOLIC BLOOD PRESSURE: 88 MMHG | BODY MASS INDEX: 32.46 KG/M2 | SYSTOLIC BLOOD PRESSURE: 138 MMHG | HEART RATE: 68 BPM | RESPIRATION RATE: 18 BRPM | WEIGHT: 204.2 LBS | TEMPERATURE: 96.8 F

## 2019-03-12 DIAGNOSIS — M54.41 CHRONIC BILATERAL LOW BACK PAIN WITH BILATERAL SCIATICA: Primary | ICD-10-CM

## 2019-03-12 DIAGNOSIS — J34.2 DEVIATED NASAL SEPTUM: ICD-10-CM

## 2019-03-12 DIAGNOSIS — M54.42 CHRONIC BILATERAL LOW BACK PAIN WITH BILATERAL SCIATICA: Primary | ICD-10-CM

## 2019-03-12 DIAGNOSIS — G89.29 CHRONIC BILATERAL LOW BACK PAIN WITH BILATERAL SCIATICA: Primary | ICD-10-CM

## 2019-03-12 PROCEDURE — 99214 OFFICE O/P EST MOD 30 MIN: CPT | Performed by: INTERNAL MEDICINE

## 2019-03-12 PROCEDURE — G0463 HOSPITAL OUTPT CLINIC VISIT: HCPCS

## 2019-03-12 ASSESSMENT — ENCOUNTER SYMPTOMS
HEMATOLOGIC/LYMPHATIC NEGATIVE: 1
CONSTITUTIONAL NEGATIVE: 1
ENDOCRINE NEGATIVE: 1
ALLERGIC/IMMUNOLOGIC NEGATIVE: 1

## 2019-03-12 ASSESSMENT — PATIENT HEALTH QUESTIONNAIRE - PHQ9: SUM OF ALL RESPONSES TO PHQ QUESTIONS 1-9: 0

## 2019-03-12 NOTE — NURSING NOTE
"The patient is here today to get a referral for a back injection and a nose specialist.  Marleen Shine LPN on 3/12/2019 at 8:21 AM  Chief Complaint   Patient presents with     Referral       Initial /88 (BP Location: Right arm, Patient Position: Sitting, Cuff Size: Adult Large)   Pulse 68   Temp 96.8  F (36  C) (Tympanic)   Resp 18   Wt 92.6 kg (204 lb 3.2 oz)   BMI 32.46 kg/m   Estimated body mass index is 32.46 kg/m  as calculated from the following:    Height as of 7/30/18: 1.689 m (5' 6.5\").    Weight as of this encounter: 92.6 kg (204 lb 3.2 oz).  Medication Reconciliation: complete    Marleen Shine LPN    "

## 2019-03-12 NOTE — PROGRESS NOTES
Chief Complaint: Back and nasal problems.    HPI: This patient is here today for the above 2 complaints.  He has a history of lumbar spinal stenosis that is symptomatic.  He had an injection done in January which helped for a short time but not to his satisfaction.  He would like to try another injection, in fact he was called by the radiology department telling him that he certainly could have another one if he would like.  He did have a friend who had an injection by Dr. Johnson so he is requesting that we schedule him with her for this injection.  I told him that should not be a problem and we could schedule him for this.  I spent some time today reviewing his pathology including showing him his MRI high and the areas of lumbar spinal stenosis.    He also has a nasal problem.  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  He says that the person doing the DOT physical offered to fix this for him.  He of course declined at that time.  It is now getting to the point, however, where the breathing is getting much worse through his nose and he would like to pursue this further.  He does use Breathe Right strips at night to try to facilitate his breathing.  He does not recall ever having a nasal fracture.    Past Medical History:   Diagnosis Date     Elevated prostate specific antigen (PSA)     2106,psa 5.1     Hypertension      Osteoarthritis        Past Surgical History:   Procedure Laterality Date     ARTHROSCOPY KNEE Left 2012     [place]ONOSCOPY  [place]     JOINT REPLACEMENT Right 2017     PROSTATE BIOPSY, NEEDLE, SATURATION SAMPLING  2017     TONSILLECTOMY      No Comments Provided     VASECTOMY      No Comments Provided       No Known Allergies    Current Outpatient Medications   Medication Sig Dispense Refill     betamethasone valerate (VALISONE) 0.1 % external ointment Apply topically 2 times daily 30 g 1     ibuprofen  (ADVIL/MOTRIN) 800 MG tablet Take 800 mg by mouth daily       losartan (COZAAR) 25 MG tablet Take 12.5 mg by mouth daily         Social History     Socioeconomic History     Marital status:      Spouse name: Not on file     Number of children: Not on file     Years of education: Not on file     Highest education level: Not on file   Occupational History     Not on file   Social Needs     Financial resource strain: Not on file     Food insecurity:     Worry: Not on file     Inability: Not on file     Transportation needs:     Medical: Not on file     Non-medical: Not on file   Tobacco Use     Smoking status: Current Every Day Smoker     Types: Pipe     Smokeless tobacco: Never Used     Tobacco comment: Quit smoking: refused   Substance and Sexual Activity     Alcohol use: Yes     Comment: 14 a week      Drug use: No     Types: Other     Comment: Drug use: No     Sexual activity: Not on file   Lifestyle     Physical activity:     Days per week: Not on file     Minutes per session: Not on file     Stress: Not on file   Relationships     Social connections:     Talks on phone: Not on file     Gets together: Not on file     Attends [employer] service: Not on file     Active member of club or organization: Not on file     Attends meetings of clubs or organizations: Not on file     Relationship status: Not on file     Intimate partner violence:     Fear of current or ex partner: Not on file     Emotionally abused: Not on file     Physically abused: Not on file     Forced sexual activity: Not on file   Other Topics Concern     Not on file   Social History Narrative    He is .  He was  twice and .  He is a pipe smoker with occasional use of alcohol.  He was in the [employer] and was stationed a year in [place] and a year in [place].  He has been a .  He did go to  school.  Retired from [employer]. Lives in [facility] [place] [facility].       Review of Systems   Constitutional: Negative.     Endocrine: Negative.    Skin: Negative.    Allergic/Immunologic: Negative.    Hematological: Negative.        Physical Exam   Constitutional: He appears well-developed and well-nourished. No distress.   HENT:   Head: Normocephalic.   His nasal passages were indeed fairly narrow.  It was difficult to tell with certainty whether a deviated septum was present or not.  No visual pathology was otherwise identified.   Skin: He is not diaphoretic.   Nursing note and vitals reviewed.      Assessment:      ICD-10-CM    1. Chronic bilateral low back pain with bilateral sciatica M54.42 XR Lumbar Epidural Injection Incl Imaging    M54.41     G89.29    2. Deviated nasal septum J34.2 OTOLARYNGOLOGY REFERRAL       Plan: As mentioned, options are reviewed.  He would like to try another injection into his lumbar spine so this is ordered.  If this does not help, I did discuss with him that ultimately he might need surgery.  He is not symptomatic enough to warrant that at the present time.  Reviewed options for ENT consultation and elected to set him up with Dr. Doan in [place] for the nasal issues.  Follow-up will be as needed.  I encouraged him to stop his ibuprofen and advance of the lumbar injection.

## 2019-03-18 ENCOUNTER — HOSPITAL ENCOUNTER (OUTPATIENT)
Dept: GENERAL RADIOLOGY | Facility: OTHER | Age: 69
Discharge: HOME | End: 2019-03-18
Attending: INTERNAL MEDICINE | Admitting: INTERNAL MEDICINE
Payer: MEDICARE

## 2019-03-18 DIAGNOSIS — M54.41 CHRONIC BILATERAL LOW BACK PAIN WITH BILATERAL SCIATICA: ICD-10-CM

## 2019-03-18 DIAGNOSIS — G89.29 CHRONIC BILATERAL LOW BACK PAIN WITH BILATERAL SCIATICA: ICD-10-CM

## 2019-03-18 DIAGNOSIS — M54.42 CHRONIC BILATERAL LOW BACK PAIN WITH BILATERAL SCIATICA: ICD-10-CM

## 2019-03-18 PROCEDURE — 62323 NJX INTERLAMINAR LMBR/SAC: CPT

## 2019-03-18 PROCEDURE — 25000125 ZZHC RX 250: Performed by: RADIOLOGY

## 2019-03-18 PROCEDURE — 25500064 ZZH RX 255 OP 636: Performed by: RADIOLOGY

## 2019-03-18 RX ORDER — LIDOCAINE HYDROCHLORIDE 10 MG/ML
2 INJECTION, SOLUTION EPIDURAL; INFILTRATION; INTRACAUDAL; PERINEURAL ONCE
Status: COMPLETED | OUTPATIENT
Start: 2019-03-18 | End: 2019-03-18

## 2019-03-18 RX ORDER — METHYLPREDNISOLONE ACETATE 80 MG/ML
80 INJECTION, SUSPENSION INTRA-ARTICULAR; INTRALESIONAL; INTRAMUSCULAR; SOFT TISSUE ONCE
Status: COMPLETED | OUTPATIENT
Start: 2019-03-18 | End: 2019-03-18

## 2019-03-18 RX ADMIN — LIDOCAINE HYDROCHLORIDE 2 ML: 10 INJECTION, SOLUTION EPIDURAL; INFILTRATION; INTRACAUDAL; PERINEURAL at 10:15

## 2019-03-18 RX ADMIN — METHYLPREDNISOLONE ACETATE 80 MG: 80 INJECTION, SUSPENSION INTRA-ARTICULAR; INTRALESIONAL; INTRAMUSCULAR; SOFT TISSUE at 10:15

## 2019-03-18 RX ADMIN — Medication 2 ML: at 10:15

## 2019-03-18 RX ADMIN — LIDOCAINE HYDROCHLORIDE 3 ML: 10 INJECTION, SOLUTION INFILTRATION; PERINEURAL at 10:15

## 2019-06-11 ENCOUNTER — OFFICE VISIT (OUTPATIENT)
Dept: OTOLARYNGOLOGY | Facility: OTHER | Age: 69
End: 2019-06-11
Attending: OTOLARYNGOLOGY
Payer: MEDICARE

## 2019-06-11 DIAGNOSIS — J34.3 HYPERTROPHY OF INFERIOR NASAL TURBINATE: ICD-10-CM

## 2019-06-11 DIAGNOSIS — J34.2 NASAL SEPTAL DEVIATION: Primary | ICD-10-CM

## 2019-06-11 PROCEDURE — G0463 HOSPITAL OUTPT CLINIC VISIT: HCPCS

## 2019-06-18 NOTE — PROGRESS NOTES
document embedded image  Patient Name:  Ammon Harding  YOB: 1950  MR Number:  [id number]  Acct Number: [id number]    cc: Chemo Ramos MD~    ENT Progress Note    Date: 06/11/19    Visit Reasons: DEVIATED SEPTUM      HPI: Chief complaint:  Nasal obstruction    History  The patient is a 68-year-old gentleman who comes to the office today with complaints of nasal obstruction bilaterally.  He has suffered with this most of his adult life.  He uses breathe right strips at night to help his airway somewhat.  He does not recall any specific trauma to his nose.  He has had no previous nasal surgery.  He has no seasonal variance to his symptoms.    Exam  Nasal-his nasal septum impinges on his left lateral nasal wall.  He has inferior turbinate hypertrophy on the right some on the left as well.  Oral cavity oropharynx-free of lesions or inflammation  Neck-no masses or adenopathy  Head neck integument-Clear  General-the patient appears well and in no distress  Neuro-there are no focal cranial nerve deficits      PFSH:     Medical History    Diagnosis unknown (Acute)  No eCW History       A&P  Assessment & Plan  (1) Nasal septal deviation:         Code(s):  J34.2 - Deviated nasal septum        He would be a good candidate for nasal septoplasty with radiofrequency inferior turbinate reduction surgery.  I would anticipate that this would improve his airway greatly.  The procedure, expected postoperative course, possible complications were reviewed for him here today.  He does seem to understand and wish to proceed.  We will make arrangements at his convenience.  (2) Hypertrophy of both inferior nasal turbinates:         Code(s):  J34.3 - Hypertrophy of nasal turbinates      Suman Mcfarlane MD              06/11/19 0958   <Electronically signed by Suman Mcfarlane MD> 06/11/19 1000

## 2019-06-25 ENCOUNTER — OFFICE VISIT (OUTPATIENT)
Dept: INTERNAL MEDICINE | Facility: OTHER | Age: 69
End: 2019-06-25
Attending: INTERNAL MEDICINE
Payer: MEDICARE

## 2019-06-25 VITALS
HEIGHT: 67 IN | DIASTOLIC BLOOD PRESSURE: 88 MMHG | BODY MASS INDEX: 31.23 KG/M2 | TEMPERATURE: 97.2 F | SYSTOLIC BLOOD PRESSURE: 136 MMHG | HEART RATE: 79 BPM | WEIGHT: 199 LBS | RESPIRATION RATE: 18 BRPM

## 2019-06-25 DIAGNOSIS — R97.20 ELEVATED PSA: Primary | ICD-10-CM

## 2019-06-25 DIAGNOSIS — M19.91 PRIMARY OSTEOARTHRITIS, UNSPECIFIED SITE: ICD-10-CM

## 2019-06-25 DIAGNOSIS — Z01.818 PRE-OP EXAM: ICD-10-CM

## 2019-06-25 DIAGNOSIS — Z13.6 SCREENING FOR AAA (ABDOMINAL AORTIC ANEURYSM): ICD-10-CM

## 2019-06-25 DIAGNOSIS — I10 BENIGN ESSENTIAL HYPERTENSION: ICD-10-CM

## 2019-06-25 DIAGNOSIS — Z87.891 PERSONAL HISTORY OF TOBACCO USE: ICD-10-CM

## 2019-06-25 DIAGNOSIS — J34.2 DEVIATED NASAL SEPTUM: ICD-10-CM

## 2019-06-25 PROBLEM — Z72.0 TOBACCO USER: Status: RESOLVED | Noted: 2018-02-16 | Resolved: 2019-06-25

## 2019-06-25 PROBLEM — N41.9 PROSTATE INFECTION: Status: RESOLVED | Noted: 2017-10-18 | Resolved: 2019-06-25

## 2019-06-25 PROBLEM — M19.90 OSTEOARTHRITIS: Status: ACTIVE | Noted: 2019-06-25

## 2019-06-25 LAB
ANION GAP SERPL CALCULATED.3IONS-SCNC: 8 MMOL/L (ref 3–14)
BUN SERPL-MCNC: 28 MG/DL (ref 7–25)
CALCIUM SERPL-MCNC: 9.5 MG/DL (ref 8.6–10.3)
CHLORIDE SERPL-SCNC: 107 MMOL/L (ref 98–107)
CO2 SERPL-SCNC: 26 MMOL/L (ref 21–31)
CREAT SERPL-MCNC: 1.47 MG/DL (ref 0.7–1.3)
GFR SERPL CREATININE-BSD FRML MDRD: 47 ML/MIN/{1.73_M2}
GLUCOSE SERPL-MCNC: 93 MG/DL (ref 70–105)
HGB BLD-MCNC: 14.7 G/DL (ref 13.3–17.7)
POTASSIUM SERPL-SCNC: 3.9 MMOL/L (ref 3.5–5.1)
SODIUM SERPL-SCNC: 141 MMOL/L (ref 134–144)

## 2019-06-25 PROCEDURE — 85018 HEMOGLOBIN: CPT | Mod: ZL | Performed by: INTERNAL MEDICINE

## 2019-06-25 PROCEDURE — G0463 HOSPITAL OUTPT CLINIC VISIT: HCPCS | Mod: 25

## 2019-06-25 PROCEDURE — 80048 BASIC METABOLIC PNL TOTAL CA: CPT | Mod: ZL | Performed by: INTERNAL MEDICINE

## 2019-06-25 PROCEDURE — 36415 COLL VENOUS BLD VENIPUNCTURE: CPT | Mod: ZL | Performed by: INTERNAL MEDICINE

## 2019-06-25 PROCEDURE — G0296 VISIT TO DETERM LDCT ELIG: HCPCS | Performed by: INTERNAL MEDICINE

## 2019-06-25 PROCEDURE — 99215 OFFICE O/P EST HI 40 MIN: CPT | Performed by: INTERNAL MEDICINE

## 2019-06-25 PROCEDURE — 93010 ELECTROCARDIOGRAM REPORT: CPT | Performed by: INTERNAL MEDICINE

## 2019-06-25 PROCEDURE — 93005 ELECTROCARDIOGRAM TRACING: CPT

## 2019-06-25 RX ORDER — FINASTERIDE 5 MG/1
5 TABLET, FILM COATED ORAL DAILY
COMMUNITY
Start: 2019-06-24 | End: 2022-12-15

## 2019-06-25 SDOH — HEALTH STABILITY: MENTAL HEALTH: HOW OFTEN DO YOU HAVE A DRINK CONTAINING ALCOHOL?: 4 OR MORE TIMES A WEEK

## 2019-06-25 SDOH — HEALTH STABILITY: MENTAL HEALTH: HOW MANY STANDARD DRINKS CONTAINING ALCOHOL DO YOU HAVE ON A TYPICAL DAY?: 1 OR 2

## 2019-06-25 ASSESSMENT — ENCOUNTER SYMPTOMS
ABDOMINAL PAIN: 0
SEIZURES: 0
BLOOD IN STOOL: 0
HALLUCINATIONS: 0
DIAPHORESIS: 0
SPEECH DIFFICULTY: 0
NERVOUS/ANXIOUS: 0
HEADACHES: 0
TREMORS: 0
SINUS PRESSURE: 0
SORE THROAT: 0
NECK PAIN: 0
JOINT SWELLING: 0
BACK PAIN: 0
SHORTNESS OF BREATH: 0
CHILLS: 0
FEVER: 0
WEAKNESS: 0
HEMATURIA: 0
VOMITING: 0
NUMBNESS: 0
CONSTIPATION: 0
LIGHT-HEADEDNESS: 0
DYSURIA: 0
NAUSEA: 0
EYE PAIN: 0
ABDOMINAL DISTENTION: 0
PALPITATIONS: 0
TROUBLE SWALLOWING: 0
CONFUSION: 0
WHEEZING: 0
WOUND: 0
FATIGUE: 0
VOICE CHANGE: 0
NECK STIFFNESS: 0
ADENOPATHY: 0
SINUS PAIN: 0
CHEST TIGHTNESS: 0
FREQUENCY: 0
EYE REDNESS: 0
AGITATION: 0
RHINORRHEA: 0
COUGH: 0
UNEXPECTED WEIGHT CHANGE: 0
FLANK PAIN: 0
DIARRHEA: 0
SLEEP DISTURBANCE: 0
DIZZINESS: 0
APPETITE CHANGE: 0
BRUISES/BLEEDS EASILY: 0
DIFFICULTY URINATING: 0

## 2019-06-25 ASSESSMENT — MIFFLIN-ST. JEOR: SCORE: 1626.29

## 2019-06-25 ASSESSMENT — PATIENT HEALTH QUESTIONNAIRE - PHQ9: SUM OF ALL RESPONSES TO PHQ QUESTIONS 1-9: 0

## 2019-06-25 NOTE — NURSING NOTE
"The patient is here today to have a preop done for nasal surgery.  Marleen Shine LPN on 6/25/2019 at 2:56 PM  Chief Complaint   Patient presents with     Pre-Op Exam       Initial /88 (BP Location: Right arm, Patient Position: Sitting, Cuff Size: Adult Regular)   Pulse 79   Temp 97.2  F (36.2  C) (Tympanic)   Resp 18   Ht 1.702 m (5' 7\")   Wt 90.3 kg (199 lb)   BMI 31.17 kg/m   Estimated body mass index is 31.17 kg/m  as calculated from the following:    Height as of this encounter: 1.702 m (5' 7\").    Weight as of this encounter: 90.3 kg (199 lb).  Medication Reconciliation: complete    Marleen Shine LPN    "

## 2019-06-25 NOTE — LETTER
June 25, 2019      Ammon Harding  [address] [place] [address]        Dear Ammon,    Below are the results of your recent labs:    Results for orders placed or performed in visit on 06/25/19   Basic Metabolic Panel   Result Value Ref Range    Sodium 141 134 - 144 mmol/L    Potassium 3.9 3.5 - 5.1 mmol/L    Chloride 107 98 - 107 mmol/L    Carbon Dioxide 26 21 - 31 mmol/L    Anion Gap 8 3 - 14 mmol/L    Glucose 93 70 - 105 mg/dL    Urea Nitrogen 28 (H) 7 - 25 mg/dL    Creatinine 1.47 (H) 0.70 - 1.30 mg/dL    GFR Estimate 47 (L) >60 mL/min/[1.73_m2]    GFR Estimate If Black 57 (L) >60 mL/min/[1.73_m2]    Calcium 9.5 8.6 - 10.3 mg/dL   Hemoglobin   Result Value Ref Range    Hemoglobin 14.7 13.3 - 17.7 g/dL   EKG 12-LEAD CLINIC READ    Impression    Sinus rhythm with a rate of 72.  Axis is normal.  OH interval normal.  QRS shows a complete right bundle branch block.  Compared to a previous tracing dated July 30, 2018, the PVCs are no longer present.  Chemo Ramos MD          Your blood tests show that you do have some mild weakness of your kidneys.  Make sure that you stay well-hydrated.  The next time that you are in we will plan to recheck your kidney function once again.    Sincerely,        Chemo Ramos MD  Internal Medicine  [facility]

## 2019-06-25 NOTE — PROGRESS NOTES
Your blood tests show that you have some mild weakness of your kidneys.  Make sure that you stay well hydrated.  The next time that you are in, we will plan to recheck your kidney tests again.  Chief Complaint:  This patient is here for preoperative consultation and clearance.      HPI: This patient is here for preoperative evaluation.  He is scheduled for nasal surgery on June 27.  This will be done by Dr. Mcfarlane at [facility] in [place].  This is being done for deviated septum and turbinate hypertrophy.    The patient is otherwise generally healthy.  He has a history of hypertension and is on low-dose single drug therapy for control of his blood pressure.  He has good control and does not have any endorgan disease.  He tolerates his medication without difficulty.  The patient also has a history of lower urinary tract symptoms.  He has had prostate biopsies as well because of an elevated PSA.  He is currently taking finasteride daily and he has regular follow-up on this.    The patient is also troubled with osteoarthritis.  He normally takes ibuprofen for this but he is been off of it for the last week.  He can definitely tell that he is no longer taking the ibuprofen as his arthritis is much more symptomatic of late.    He denies any recent acute illnesses.  He denies any shortness of breath or cough.  He denies chest pain or pressure and does not have any previous cardiac history.  He denies any GI or  symptoms or concerns.    Medications are reconciled.  Past medical history, past surgical history, family history and social histories are reviewed and updated.  He has never had an anesthesia complication.  He has never had a bleeding diathesis.  He has never had a blood transfusion.    Past Medical History:   Diagnosis Date     Elevated prostate specific antigen (PSA)     2106,psa 5.1     Hypertension      Osteoarthritis        Past Surgical History:   Procedure Laterality Date     ARTHROPLASTY  KNEE Right 2017     ARTHROSCOPY KNEE Left 2012     [facility]     PROSTATE BIOPSY, NEEDLE, SATURATION SAMPLING  2017     TONSILLECTOMY      No Comments Provided     VASECTOMY      No Comments Provided       Current Outpatient Medications   Medication Sig Dispense Refill     betamethasone valerate (VALISONE) 0.1 % external ointment Apply topically 2 times daily 30 g 1     finasteride (PROSCAR) 5 MG tablet Take 5 mg by mouth daily        ibuprofen (ADVIL/MOTRIN) 800 MG tablet Take 800 mg by mouth daily       losartan (COZAAR) 25 MG tablet Take 12.5 mg by mouth daily         No Known Allergies    Family History   Problem Relation Age of Onset     Heart Failure Father      Dementia Father      Other - See Comments Mother         Dementia     Diabetes Brother      Heart Disease Other         Heart Disease,In distant relatives       Social History     Socioeconomic History     Marital status:      Spouse name: Not on file     Number of children: Not on file     Years of education: Not on file     Highest education level: Not on file   Occupational History     Not on file   Social Needs     Financial resource strain: Not on file     Food insecurity:     Worry: Not on file     Inability: Not on file     Transportation needs:     Medical: Not on file     Non-medical: Not on file   Tobacco Use     Smoking status: Current Every Day Smoker     Types: Pipe     Smokeless tobacco: Never Used     Tobacco comment: Quit smoking: refused   Substance and Sexual Activity     Alcohol use: Yes     Frequency: 4 or more times a week     Drinks per session: 1 or 2     Comment: 14 a week      Drug use: No     Comment: Drug use: No     Sexual activity: Yes     Partners: Female   Lifestyle     Physical activity:     Days per week: Not on file     Minutes per session: Not on file     Stress: Not on file   Relationships     Social connections:     Talks on phone: Not on file     Gets together: Not on file      Attends [employer] service: Not on file     Active member of club or organization: Not on file     Attends meetings of clubs or organizations: Not on file     Relationship status: Not on file     Intimate partner violence:     Fear of current or ex partner: Not on file     Emotionally abused: Not on file     Physically abused: Not on file     Forced sexual activity: Not on file   Other Topics Concern     Not on file   Social History Narrative    He is .  He was  twice and .  He is a pipe smoker with occasional use of alcohol.  He was in the [employer] and was stationed a year in [place] [place] and a year in [place].  He has been a .  He did go to [employer] school.  Retired from [employer]. Lives in [facility].       Review of Systems   Constitutional: Negative for appetite change, chills, diaphoresis, fatigue, fever and unexpected weight change.   HENT: Negative for ear pain, rhinorrhea, sinus pressure, sinus pain, sore throat, trouble swallowing and voice change.    Eyes: Negative for pain, redness and visual disturbance.   Respiratory: Negative for cough, chest tightness, shortness of breath and wheezing.    Cardiovascular: Negative for chest pain, palpitations and leg swelling.   Gastrointestinal: Negative for abdominal distention, abdominal pain, blood in stool, constipation, diarrhea, nausea and vomiting.   Endocrine: Negative for cold intolerance and heat intolerance.   Genitourinary: Negative for difficulty urinating, dysuria, flank pain, frequency and hematuria.   Musculoskeletal: Negative for back pain, joint swelling, neck pain and neck stiffness.   Skin: Negative for rash and wound.   Allergic/Immunologic: Negative for immunocompromised state.   Neurological: Negative for dizziness, tremors, seizures, syncope, speech difficulty, weakness, light-headedness, numbness and headaches.   Hematological: Negative for adenopathy. Does not bruise/bleed easily.   Psychiatric/Behavioral:  Negative for agitation, behavioral problems, confusion, hallucinations and sleep disturbance. The patient is not nervous/anxious.        Physical Exam   Constitutional: He is oriented to person, place, and time. He appears well-developed and well-nourished. No distress.   HENT:   Head: Normocephalic.   Right Ear: External ear normal.   Left Ear: External ear normal.   Nose: Nose normal.   Mouth/Throat: Oropharynx is clear and moist. No oropharyngeal exudate.   Eyes: Pupils are equal, round, and reactive to light. Conjunctivae are normal.   Neck: Normal range of motion. Neck supple. Normal carotid pulses and no JVD present. Carotid bruit is not present. No tracheal deviation present. No thyromegaly present.   Cardiovascular: Normal rate, regular rhythm and normal heart sounds. Exam reveals no gallop and no friction rub.   No murmur heard.  Pulmonary/Chest: Effort normal and breath sounds normal. No stridor. No respiratory distress. He has no wheezes. He has no rales.   Abdominal: Soft. Bowel sounds are normal. He exhibits no distension and no mass. There is no tenderness. There is no rebound and no guarding. No hernia.   Musculoskeletal: Normal range of motion. He exhibits no edema.   Lymphadenopathy:     He has no cervical adenopathy.   Neurological: He is alert and oriented to person, place, and time. He displays normal reflexes. No cranial nerve deficit or sensory deficit. He exhibits normal muscle tone. Coordination normal.   Skin: Skin is warm and dry. No rash noted. He is not diaphoretic.   Psychiatric: He has a normal mood and affect.   Nursing note and vitals reviewed.    Results for orders placed or performed in visit on 06/25/19   Basic Metabolic Panel   Result Value Ref Range    Sodium 141 134 - 144 mmol/L    Potassium 3.9 3.5 - 5.1 mmol/L    Chloride 107 98 - 107 mmol/L    Carbon Dioxide 26 21 - 31 mmol/L    Anion Gap 8 3 - 14 mmol/L    Glucose 93 70 - 105 mg/dL    Urea Nitrogen 28 (H) 7 - 25 mg/dL     Creatinine 1.47 (H) 0.70 - 1.30 mg/dL    GFR Estimate 47 (L) >60 mL/min/[1.73_m2]    GFR Estimate If Black 57 (L) >60 mL/min/[1.73_m2]    Calcium 9.5 8.6 - 10.3 mg/dL   Hemoglobin   Result Value Ref Range    Hemoglobin 14.7 13.3 - 17.7 g/dL   EKG 12-LEAD CLIN[facility] READ    Impression    Sinus rhythm with a rate of 72.  Axis is normal.  IN interval normal.  QRS shows a complete right bundle branch block.  Compared to a previous tracing dated July 30, 2018, the PVCs are no longer present.  Chemo Ramos MD         Assessment:      ICD-10-CM    1. Elevated PSA R97.20    2. Benign essential hypertension I10 Hemoglobin     Basic Metabolic Panel   3. Pre-op exam Z01.818 EKG 12-LEAD CLINIC READ   4. Primary osteoarthritis, unspecified site M19.91    5. Deviated nasal septum J34.2    6. Personal history of tobacco use Z87.891 Prof Fee: Shared Decision Making Visit for Lung Cancer Screening   7. Screening for AAA (abdominal aortic aneurysm) Z13.6 Abdominal Aortic Aneurysm Screening/Tracking        Plan: This patient is scheduled for nasal surgery.  His exam today is acceptable.  His EKG is unchanged showing a right bundle branch block.  Lab is acceptable.  He is okay for this planned procedure without contraindication.  He is off aspirin and anti-inflammatories.  He will take his losartan the morning of the surgery with a small sip of water.  Usual medications can be reinstituted postoperatively.        Lung Cancer Screening Shared Decision Making Visit     Ammon Harding is not eligible for lung cancer screening on the basis of the information provided in my signed lung cancer screening order. Ammon's smoking history is below the threshold and so it is not recommended.    Patient is currently a smoker and so we did discuss that the only way to prevent lung cancer is to not smoke. Smoking cessation assistance was offered.    ShouldIScreen

## 2019-06-27 ENCOUNTER — TRANSFERRED RECORDS (OUTPATIENT)
Dept: HEALTH INFORMATION MANAGEMENT | Facility: OTHER | Age: 69
End: 2019-06-27

## 2019-07-16 ENCOUNTER — OFFICE VISIT (OUTPATIENT)
Dept: OTOLARYNGOLOGY | Facility: OTHER | Age: 69
End: 2019-07-16
Attending: OTOLARYNGOLOGY
Payer: MEDICARE

## 2019-07-16 DIAGNOSIS — Z09 POSTOP CHECK: Primary | ICD-10-CM

## 2019-07-16 PROCEDURE — G0463 HOSPITAL OUTPT CLINIC VISIT: HCPCS

## 2019-07-18 NOTE — PROGRESS NOTES
document embedded image  Patient Name:  Ammon Harding  YOB: 1950  MR Number:  [id number]  Acct Number: [id number]    cc: ~    ENT Progress Note    Date: 07/16/19    Visit Reasons: PO Septo    HPI  HPI  HPI: Chief complaint:  Postop check    History  The patient is a 68-year-old gentleman who is recently status post septoplasty.  He feels he is breathing approximately at the point where he was preoperatively at this time.  He does not have any significant lingering pain.  Is  Exam  Nasal-his nasal membranes remain boggy.  There is some crusting present.  He appears to be in the midline.    PFSH  PFSH  PFSH:     Medical History    Diagnosis unknown (Acute)  No eCW History       A&P  Assessment & Plan  (1) Postop check:         Code(s):  Z09 - Encounter for follow-up examination after completed treatment for conditions other than malignant neoplasm        He was encouraged to follow-up in 3 months if he has lingering nasal obstruction.      Suman Mcfarlane MD              07/16/19 1515   <Electronically signed by Suman Mcfarlane MD> 07/18/19 2601

## 2019-08-26 ENCOUNTER — OFFICE VISIT (OUTPATIENT)
Dept: FAMILY MEDICINE | Facility: OTHER | Age: 69
End: 2019-08-26
Attending: NURSE PRACTITIONER
Payer: MEDICARE

## 2019-08-26 VITALS
OXYGEN SATURATION: 98 % | SYSTOLIC BLOOD PRESSURE: 148 MMHG | BODY MASS INDEX: 31.63 KG/M2 | WEIGHT: 201.5 LBS | RESPIRATION RATE: 18 BRPM | DIASTOLIC BLOOD PRESSURE: 76 MMHG | HEART RATE: 54 BPM | HEIGHT: 67 IN | TEMPERATURE: 97.4 F

## 2019-08-26 DIAGNOSIS — J20.9 ACUTE BRONCHITIS, UNSPECIFIED ORGANISM: Primary | ICD-10-CM

## 2019-08-26 PROCEDURE — G0463 HOSPITAL OUTPT CLINIC VISIT: HCPCS

## 2019-08-26 PROCEDURE — 99214 OFFICE O/P EST MOD 30 MIN: CPT | Performed by: NURSE PRACTITIONER

## 2019-08-26 RX ORDER — AZITHROMYCIN 250 MG/1
TABLET, FILM COATED ORAL
Qty: 6 TABLET | Refills: 0 | Status: SHIPPED | OUTPATIENT
Start: 2019-08-26 | End: 2019-10-16

## 2019-08-26 ASSESSMENT — PAIN SCALES - GENERAL: PAINLEVEL: NO PAIN (0)

## 2019-08-26 ASSESSMENT — MIFFLIN-ST. JEOR: SCORE: 1629.69

## 2019-08-26 NOTE — PATIENT INSTRUCTIONS
Azithromycin daily x 5 days     Mucinex or generic form for cough and congestion - take as directed      Most coughs are caused by a viral infection.   Usually coughs can last 2 to 3 weeks.   Coughs can last even after taking an antibiotic.      Encouraged fluids and rest.    Using a humidifier works well to break up the congestion.     Elevate the mattress to 15 degrees in order to help with the congestion.    Frequent swallows of cool liquid.      Oatmeal or honey coats the throat and some patients find it soothes the pain.       Return to clinic with change/worsening of symptoms or concerns.

## 2019-08-26 NOTE — NURSING NOTE
Patient is here for cold symptoms, states has been 3-4 weeks.  Is having congestion, runny nose, and cough.  Lorenza Grant LPN .............8/26/2019     11:57 AM      Medication Reconciliation: complete    Lorenza Grant LPN  8/26/2019 12:01 PM

## 2019-08-26 NOTE — PROGRESS NOTES
"HPI:    Ammon Harding is a 68 year old male  who presents to clinic today for cough.    States symptoms for about 3 to 4 weeks, staying the same.  Cough, chest congestion, occasional mild chest heaviness, feeling mildly winded with exertion, nasal congestion, and occasional runny nose.  Occasionally productive cough of white phlegm.  No chest pain.  No palpitations.  No lower extremity swelling.  No fevers or chills.  No sore throat.  No headaches. No light headedness or dizziness.   No body aches.  Occasional sneezing.  No change in appetite.  Energy fair \"not too bad.\"   Difficulty breathing out of nose at times. No sinus pressure.  Attributes symptoms to the fluctuation in temperature and weather.  Symptoms worse in the morning, hacking cough.  Intermittent cough during day.  Smokes pipe.    No OTC medications.          Past Medical History:   Diagnosis Date     Elevated prostate specific antigen (PSA)     2106,psa 5.1     Hypertension      Osteoarthritis      Past Surgical History:   Procedure Laterality Date     ARTHROPLASTY KNEE Right 2017     ARTHROSCOPY KNEE Left 2012     [facility]  [facility]01[facility]     PROSTATE BIOPSY, NEEDLE, SATURATION SAMPLING  2017     TONSILLECTOMY      No Comments Provided     VASECTOMY      No Comments Provided     Social History     Tobacco Use     Smoking status: Current Every Day Smoker     Types: Pipe     Smokeless tobacco: Never Used     Tobacco comment: Quit smoking: refused   Substance Use Topics     Alcohol use: Yes     Frequency: 4 or more times a week     Drinks per session: 1 or 2     Comment: 14 a week      Current Outpatient Medications   Medication Sig Dispense Refill     finasteride (PROSCAR) 5 MG tablet Take 5 mg by mouth daily        ibuprofen (ADVIL/MOTRIN) 800 MG tablet Take 800 mg by mouth daily       losartan (COZAAR) 25 MG tablet Take 12.5 mg by mouth daily       betamethasone valerate (VALISONE) 0.1 % external ointment Apply topically 2 times daily " "(Patient not taking: Reported on 8/26/2019) 30 g 1     No Known Allergies      Past medical history, past surgical history, current medications and allergies reviewed and accurate to the best of my knowledge.        ROS:  Refer to HPI    BP (!) 148/76 (BP Location: Right arm, Patient Position: Sitting, Cuff Size: Adult Large)   Pulse 54   Temp 97.4  F (36.3  C) (Tympanic)   Resp 18   Ht 1.689 m (5' 6.5\")   Wt 91.4 kg (201 lb 8 oz)   SpO2 98%   BMI 32.04 kg/m      EXAM:  General Appearance: Well appearing adult male, appropriate appearance for age. No acute distress  Head: normocephalic, atraumatic  Ears: Left TM grey, translucent with bony landmarks appreciated, no erythema, no effusion, no bulging, no purulence.  Right TM grey, translucent with bony landmarks appreciated, no erythema, no effusion, no bulging, no purulence.  Left auditory canal clear.  Right auditory canal clear.  Normal external ears, non tender.  Eyes: conjunctivae normal without erythema or irritation, no drainage or crusting, no eyelid swelling, pupils equal   Orophayrnx: moist mucous membranes, posterior pharynx with mild erythema, tonsils surgically absent, no post nasal drip seen, no trismus, voice clear.    Sinuses:  No sinus tenderness upon palpation of the frontal or maxillary sinuses  Nose:  Bilateral nares: no erythema, no edema, no drainage or congestion   Neck: supple without adenopathy  Respiratory: normal chest wall and respirations.  Normal effort.  Clear to auscultation bilaterally, no wheezing, crackles or rhonchi.  No increased work of breathing.  No cough appreciated, oxygen saturation 98%  Cardiac: RRR with no murmurs  Musculoskeletal:  Equal movement of bilateral upper extremities.  Equal movement of bilateral lower extremities.  Normal gait.    Dermatological: no rashes noted of exposed skin  Psychological: normal affect, alert and pleasant        ASSESSMENT/PLAN:    ICD-10-CM    1. Acute bronchitis, unspecified " organism J20.9 azithromycin (ZITHROMAX) 250 MG tablet         Discussed with patient that with lack of fever it is unlikely that he has pneumonia therefore chest x-ray is not indicated.  Patient would still like to be treated with antibiotics due to length and persistence of cough.    Azithromycin daily for 5 days (Z-Hardeep dosing)  Discussed using over-the-counter Mucinex as directed    Encouraged symptomatic treatment - Encouraged fluids, salt water gargles, honey, elevation, humidifier, sinus rinse/netti pot, lozenges, etc     Discussed warning signs/symptoms indicative of need to f/u    Follow up if symptoms persist or worsen or concerns      Disclaimer:  This note consists of words and symbols derived from keyboarding, dictation, or using voice recognition software. As a result, there may be errors in the script that have gone undetected. Please consider this when interpreting information found in this note.

## 2019-10-16 ENCOUNTER — OFFICE VISIT (OUTPATIENT)
Dept: INTERNAL MEDICINE | Facility: OTHER | Age: 69
End: 2019-10-16
Attending: INTERNAL MEDICINE
Payer: MEDICARE

## 2019-10-16 VITALS
DIASTOLIC BLOOD PRESSURE: 66 MMHG | TEMPERATURE: 96.3 F | BODY MASS INDEX: 31.77 KG/M2 | HEART RATE: 64 BPM | SYSTOLIC BLOOD PRESSURE: 110 MMHG | WEIGHT: 199.8 LBS | RESPIRATION RATE: 18 BRPM

## 2019-10-16 DIAGNOSIS — R97.20 ELEVATED PSA: ICD-10-CM

## 2019-10-16 DIAGNOSIS — I10 BENIGN ESSENTIAL HYPERTENSION: Primary | ICD-10-CM

## 2019-10-16 DIAGNOSIS — M19.91 PRIMARY OSTEOARTHRITIS, UNSPECIFIED SITE: ICD-10-CM

## 2019-10-16 DIAGNOSIS — N18.2 CHRONIC KIDNEY DISEASE, STAGE 2 (MILD): ICD-10-CM

## 2019-10-16 DIAGNOSIS — E78.49 OTHER HYPERLIPIDEMIA: ICD-10-CM

## 2019-10-16 DIAGNOSIS — Z23 ENCOUNTER FOR IMMUNIZATION: ICD-10-CM

## 2019-10-16 PROBLEM — Z01.818 PRE-OP EXAM: Status: RESOLVED | Noted: 2018-07-30 | Resolved: 2019-10-16

## 2019-10-16 LAB
ANION GAP SERPL CALCULATED.3IONS-SCNC: 10 MMOL/L (ref 3–14)
BUN SERPL-MCNC: 26 MG/DL (ref 7–25)
CALCIUM SERPL-MCNC: 9.4 MG/DL (ref 8.6–10.3)
CHLORIDE SERPL-SCNC: 106 MMOL/L (ref 98–107)
CHOLEST SERPL-MCNC: 227 MG/DL
CO2 SERPL-SCNC: 25 MMOL/L (ref 21–31)
CREAT SERPL-MCNC: 1.24 MG/DL (ref 0.7–1.3)
GFR SERPL CREATININE-BSD FRML MDRD: 58 ML/MIN/{1.73_M2}
GLUCOSE SERPL-MCNC: 121 MG/DL (ref 70–105)
HDLC SERPL-MCNC: 65 MG/DL (ref 23–92)
LDLC SERPL CALC-MCNC: 147 MG/DL
NONHDLC SERPL-MCNC: 162 MG/DL
POTASSIUM SERPL-SCNC: 4.7 MMOL/L (ref 3.5–5.1)
PSA SERPL-MCNC: 4.49 NG/ML
SODIUM SERPL-SCNC: 141 MMOL/L (ref 134–144)
TRIGL SERPL-MCNC: 75 MG/DL

## 2019-10-16 PROCEDURE — 99214 OFFICE O/P EST MOD 30 MIN: CPT | Performed by: INTERNAL MEDICINE

## 2019-10-16 PROCEDURE — G0463 HOSPITAL OUTPT CLINIC VISIT: HCPCS | Mod: 25

## 2019-10-16 PROCEDURE — 80061 LIPID PANEL: CPT | Mod: ZL | Performed by: INTERNAL MEDICINE

## 2019-10-16 PROCEDURE — G0008 ADMIN INFLUENZA VIRUS VAC: HCPCS

## 2019-10-16 PROCEDURE — 90686 IIV4 VACC NO PRSV 0.5 ML IM: CPT

## 2019-10-16 PROCEDURE — 36415 COLL VENOUS BLD VENIPUNCTURE: CPT | Mod: ZL | Performed by: INTERNAL MEDICINE

## 2019-10-16 PROCEDURE — G0463 HOSPITAL OUTPT CLINIC VISIT: HCPCS

## 2019-10-16 PROCEDURE — 84153 ASSAY OF PSA TOTAL: CPT | Mod: ZL | Performed by: INTERNAL MEDICINE

## 2019-10-16 PROCEDURE — 80048 BASIC METABOLIC PNL TOTAL CA: CPT | Mod: ZL | Performed by: INTERNAL MEDICINE

## 2019-10-16 RX ORDER — IBUPROFEN 800 MG/1
800 TABLET, FILM COATED ORAL DAILY PRN
COMMUNITY
Start: 2019-10-16 | End: 2022-12-15

## 2019-10-16 RX ORDER — CHLORAL HYDRATE 500 MG
2 CAPSULE ORAL DAILY
COMMUNITY
Start: 2019-10-16 | End: 2020-02-17

## 2019-10-16 ASSESSMENT — ENCOUNTER SYMPTOMS
CONSTITUTIONAL NEGATIVE: 1
ALLERGIC/IMMUNOLOGIC NEGATIVE: 1
HEMATOLOGIC/LYMPHATIC NEGATIVE: 1
ENDOCRINE NEGATIVE: 1

## 2019-10-16 ASSESSMENT — PATIENT HEALTH QUESTIONNAIRE - PHQ9: SUM OF ALL RESPONSES TO PHQ QUESTIONS 1-9: 0

## 2019-10-16 NOTE — PROGRESS NOTES
Chief Complaint:  F/U on issues.      HPI: This patient is here today for follow-up.  [facility] to [facility] somewhat of elevated cholesterol level.  He did not want to start on a statin so he is taking fish oil 2000 mg daily.  He would like to have this rechecked today to see if he is improved at all with the fish oil.    He is also here for follow-up on his elevated PSA.  Its been as high as 8, the most recent value was 6.  [facility].  He would like to have that rechecked to see how it compares.    He has a history of chronic kidney disease.  He has osteoarthritis and needs to take ibuprofen but he tries to limit it as much as possible.  He would like to have a recheck on his kidneys today.  In addition, he is wondering if he could try a prescription for Relafen instead of the ibuprofen as he heard that this is easier on the kidneys.  I told him that would be fine but I would still not consider this to be renal safe and I would want him to limit it is much as he limits the ibuprofen.    He would like to get a flu shot today.  Medications were reconciled.    Past Medical History:   Diagnosis Date     Elevated prostate specific antigen (PSA)     2106,psa 5.1     Hypertension      Osteoarthritis        Past Surgical History:   Procedure Laterality Date     ARTHROPLASTY KNEE Right 2017     ARTHROSCOPY KNEE Left 2012     [facility]     PROSTATE BIOPSY, NEEDLE, SATURATION SAMPLING  2017     TONSILLECTOMY      No Comments Provided     VASECTOMY      No Comments Provided       No Known Allergies    Current Outpatient Medications   Medication Sig Dispense Refill     betamethasone valerate (VALISONE) 0.1 % external ointment Apply topically 2 times daily 30 g 1     finasteride (PROSCAR) 5 MG tablet Take 5 mg by mouth daily        fish oil-omega-3 fatty acids 1000 MG capsule Take 2 capsules (2 g) by mouth daily       ibuprofen (ADVIL/MOTRIN) 800 MG tablet Take 1  tablet (800 mg) by mouth daily as needed for moderate pain       losartan (COZAAR) 25 MG tablet Take 12.5 mg by mouth daily       nabumetone (RELAFEN) 750 MG tablet Take 1 tablet (750 mg) by mouth 2 times daily as needed for moderate pain 60 tablet 3       Social History     Socioeconomic History     Marital status:      Spouse name: Not on file     Number of children: Not on file     Years of education: Not on file     Highest education level: Not on file   Occupational History     Not on file   Social Needs     Financial resource strain: Not on file     Food insecurity:     Worry: Not on file     Inability: Not on file     Transportation needs:     Medical: Not on file     Non-medical: Not on file   Tobacco Use     Smoking status: Current Every Day Smoker     Types: Pipe     Smokeless tobacco: Never Used     Tobacco comment: Quit smoking: refused   Substance and Sexual Activity     Alcohol use: Yes     Frequency: 4 or more times a week     Drinks per session: 1 or 2     Comment: 14 a week      Drug use: No     Comment: Drug use: No     Sexual activity: Yes     Partners: Female   Lifestyle     Physical activity:     Days per week: Not on file     Minutes per session: Not on file     Stress: Not on file   Relationships     Social connections:     Talks on phone: Not on file     Gets together: Not on file     Attends Sikhism service: Not on file     Active member of club or organization: Not on file     Attends meetings of clubs or organizations: Not on file     Relationship status: Not on file     Intimate partner violence:     Fear of current or ex partner: Not on file     Emotionally abused: Not on file     Physically abused: Not on file     Forced sexual activity: Not on file   Other Topics Concern     Not on file   Social History Narrative    He is .  He was  twice and .  He is a pipe smoker with occasional use of alcohol.  He was in the [employer] and was stationed a year in  [place] and a year in [facility] [place].  He has been a .  He did go to [employer] school.  Retired from [employer]. Lives in [place].       Review of Systems   Constitutional: Negative.    Endocrine: Negative.    Skin: Negative.    Allergic/Immunologic: Negative.    Hematological: Negative.        Physical Exam  Vitals signs and nursing note reviewed.   Constitutional:       General: He is not in acute distress.     Appearance: Normal appearance. He is not ill-appearing, toxic-appearing or diaphoretic.   Neurological:      Mental Status: He is alert.         Assessment:      ICD-10-CM    1. Benign essential hypertension I10    2. Elevated PSA R97.20 Prostate Specific Antigen GH   3. Chronic kidney disease, stage 2 (mild) N18.2 GH-IMM- FLU VAC PRESRV FREE QUAD SPLIT VIR > 6 MONTHS IM     Basic Metabolic Panel   4. Other hyperlipidemia E78.49 Lipid Panel   5. Encounter for immunization  Z23 GH-IMM- FLU VAC PRESRV FREE QUAD SPLIT VIR > 6 MONTHS IM   6. Primary osteoarthritis, unspecified site M19.91 nabumetone (RELAFEN) 750 MG tablet       Plan: Flu shot given today.  Lab is pending, I will send him a letter with the results as well as any recommendations.  Trial of Relafen in place of the ibuprofen but again I encouraged him to limit this as much as possible.  Follow-up will be dependent on his progress as well as the results of his pending lab.  I did consider reviewing CT calcium score with him to see if he would be a candidate for statin therapy but with his age of 68, I am not sure if this would be particularly beneficial or helpful.

## 2019-10-16 NOTE — LETTER
October 16, 2019      Ammon Harding  [address] [address]        Dear Ammon,    Below are the results of your recent labs:    Results for orders placed or performed in visit on 10/16/19   Prostate Specific Antigen GH   Result Value Ref Range    Prostate Specific Antigen 4.490 (H) <3.100 ng/mL   Basic Metabolic Panel   Result Value Ref Range    Sodium 141 134 - 144 mmol/L    Potassium 4.7 3.5 - 5.1 mmol/L    Chloride 106 98 - 107 mmol/L    Carbon Dioxide 25 21 - 31 mmol/L    Anion Gap 10 3 - 14 mmol/L    Glucose 121 (H) 70 - 105 mg/dL    Urea Nitrogen 26 (H) 7 - 25 mg/dL    Creatinine 1.24 0.70 - 1.30 mg/dL    GFR Estimate 58 (L) >60 mL/min/[1.73_m2]    GFR Estimate If Black 70 >60 mL/min/[1.73_m2]    Calcium 9.4 8.6 - 10.3 mg/dL   Lipid Panel   Result Value Ref Range    Cholesterol 227 (H) <200 mg/dL    Triglycerides 75 <150 mg/dL    HDL Cholesterol 65 23 - 92 mg/dL    LDL Cholesterol Calculated 147 (H) <100 mg/dL    Non HDL Cholesterol 162 (H) <130 mg/dL        Your PSA is improved, this is good news.  Your kidney function is improved also.  Your cholesterol has not changed with the fish oil.  You would need to take a statin if you want to improve your cholesterol.  If you have any questions about your results, feel free to contact me.    Sincerely,        Chemo Ramos MD  Internal Medicine  [facility]

## 2019-10-16 NOTE — NURSING NOTE
"The patient is here today to be seen for a follow up visit on lab work.  Marleen Shine LPN on 10/16/2019 at 8:31 AM  Chief Complaint   Patient presents with     RECHECK       Initial /66 (BP Location: Right arm, Patient Position: Sitting, Cuff Size: Adult Regular)   Pulse 64   Temp 96.3  F (35.7  C) (Tympanic)   Resp 18   Wt 90.6 kg (199 lb 12.8 oz)   BMI 31.77 kg/m   Estimated body mass index is 31.77 kg/m  as calculated from the following:    Height as of 8/26/19: 1.689 m (5' 6.5\").    Weight as of this encounter: 90.6 kg (199 lb 12.8 oz).  Medication Reconciliation: complete    Marleen Shine LPN    "

## 2020-02-17 ENCOUNTER — OFFICE VISIT (OUTPATIENT)
Dept: INTERNAL MEDICINE | Facility: OTHER | Age: 70
End: 2020-02-17
Attending: INTERNAL MEDICINE
Payer: MEDICARE

## 2020-02-17 VITALS
WEIGHT: 206.8 LBS | RESPIRATION RATE: 16 BRPM | SYSTOLIC BLOOD PRESSURE: 130 MMHG | DIASTOLIC BLOOD PRESSURE: 82 MMHG | TEMPERATURE: 96.7 F | BODY MASS INDEX: 32.88 KG/M2 | HEART RATE: 76 BPM

## 2020-02-17 DIAGNOSIS — N40.2 PROSTATE NODULE: ICD-10-CM

## 2020-02-17 DIAGNOSIS — R97.20 ELEVATED PSA: Primary | ICD-10-CM

## 2020-02-17 PROBLEM — A41.9 SEPSIS (H): Status: RESOLVED | Noted: 2017-10-18 | Resolved: 2020-02-17

## 2020-02-17 PROCEDURE — G0463 HOSPITAL OUTPT CLINIC VISIT: HCPCS

## 2020-02-17 PROCEDURE — 99213 OFFICE O/P EST LOW 20 MIN: CPT | Performed by: INTERNAL MEDICINE

## 2020-02-17 RX ORDER — ROSUVASTATIN CALCIUM 20 MG/1
10 TABLET, COATED ORAL DAILY
COMMUNITY
Start: 2020-02-17 | End: 2022-12-15 | Stop reason: ALTCHOICE

## 2020-02-17 ASSESSMENT — ENCOUNTER SYMPTOMS
EYES NEGATIVE: 1
CONSTITUTIONAL NEGATIVE: 1
ENDOCRINE NEGATIVE: 1
ALLERGIC/IMMUNOLOGIC NEGATIVE: 1

## 2020-02-17 ASSESSMENT — PAIN SCALES - GENERAL: PAINLEVEL: NO PAIN (0)

## 2020-02-17 NOTE — NURSING NOTE
Ammon Harding is a 69 year old male presenting today for: discuss referral for prostate issue  Medication Reconciliation: complete    Sujey Milan LPN 2/17/2020 9:36 AM

## 2020-02-17 NOTE — PROGRESS NOTES
Chief Complaint:  Discuss prostate concerns.    HPI: This patient comes in today to talk about his prostate.  [facility]  His PSA has been elevated and most recently has risen again.  This is elevated even in the presence of his finasteride therapy.  [facility].  This showed that his prostate nodule has enlarged.  He is concerned that he has prostate cancer which certainly is a possibility.  He tells me that if he did have prostate cancer he would want to have his prostate removed.  He is certainly healthy enough and active enough to warrant this.    Past Medical History:   Diagnosis Date     Elevated prostate specific antigen (PSA)     2106,psa 5.1     Hypertension      Osteoarthritis        Past Surgical History:   Procedure Laterality Date     ARTHROPLASTY KNEE Right 2017     ARTHROSCOPY KNEE Left 2012     [facility]  [facility]     PROSTATE BIOPSY, NEEDLE, SATURATION SAMPLING  2017     TONSILLECTOMY      No Comments Provided     VASECTOMY      No Comments Provided       No Known Allergies    Current Outpatient Medications   Medication Sig Dispense Refill     finasteride (PROSCAR) 5 MG tablet Take 5 mg by mouth daily        ibuprofen (ADVIL/MOTRIN) 800 MG tablet Take 1 tablet (800 mg) by mouth daily as needed for moderate pain       losartan (COZAAR) 25 MG tablet Take 12.5 mg by mouth daily       nabumetone (RELAFEN) 750 MG tablet Take 1 tablet (750 mg) by mouth 2 times daily as needed for moderate pain 60 tablet 3     rosuvastatin (CRESTOR) 20 MG tablet Take 0.5 tablets (10 mg) by mouth daily         Review of Systems   Constitutional: Negative.    Eyes: Negative.    Endocrine: Negative.    Allergic/Immunologic: Negative.        Physical Exam  Vitals signs and nursing note reviewed.   Constitutional:       General: He is not in acute distress.     Appearance: Normal appearance. He is not ill-appearing, toxic-appearing or diaphoretic.    Neurological:      Mental Status: He is alert.         Assessment:        ICD-10-CM    1. Elevated PSA R97.20 UROLOGY ADULT REFERRAL   2. Prostate nodule N40.2 UROLOGY ADULT REFERRAL       Plan: Situation was reviewed with the patient.  We have elected to refer him to the [facility] for further evaluation of this elevated PSA and abnormal prostate on MRI.  No medication changes are made today but it is noted [facility] and took him off his fish oil which I think is a good idea.

## 2020-03-11 ENCOUNTER — HEALTH MAINTENANCE LETTER (OUTPATIENT)
Age: 70
End: 2020-03-11

## 2020-03-16 ENCOUNTER — TRANSFERRED RECORDS (OUTPATIENT)
Dept: HEALTH INFORMATION MANAGEMENT | Facility: OTHER | Age: 70
End: 2020-03-16

## 2020-05-05 DIAGNOSIS — R97.20 ELEVATED PROSTATE SPECIFIC ANTIGEN (PSA): Primary | ICD-10-CM

## 2020-05-11 DIAGNOSIS — R97.20 ELEVATED PROSTATE SPECIFIC ANTIGEN (PSA): ICD-10-CM

## 2020-05-11 LAB
ALBUMIN UR-MCNC: NEGATIVE MG/DL
APPEARANCE UR: CLEAR
BILIRUB UR QL STRIP: NEGATIVE
COLOR UR AUTO: NORMAL
GLUCOSE UR STRIP-MCNC: NEGATIVE MG/DL
HGB UR QL STRIP: NEGATIVE
KETONES UR STRIP-MCNC: NEGATIVE MG/DL
LEUKOCYTE ESTERASE UR QL STRIP: NEGATIVE
NITRATE UR QL: NEGATIVE
PH UR STRIP: 5.5 PH (ref 5–7)
SOURCE: NORMAL
SP GR UR STRIP: 1.01 (ref 1–1.03)
UROBILINOGEN UR STRIP-MCNC: NORMAL MG/DL (ref 0–2)

## 2020-05-11 PROCEDURE — 81003 URINALYSIS AUTO W/O SCOPE: CPT | Mod: ZL

## 2020-05-11 PROCEDURE — 87086 URINE CULTURE/COLONY COUNT: CPT | Mod: ZL

## 2020-05-13 LAB
BACTERIA SPEC CULT: NORMAL
SPECIMEN SOURCE: NORMAL

## 2020-07-10 ENCOUNTER — OFFICE VISIT (OUTPATIENT)
Dept: INTERNAL MEDICINE | Facility: OTHER | Age: 70
End: 2020-07-10
Attending: INTERNAL MEDICINE
Payer: MEDICARE

## 2020-07-10 VITALS
RESPIRATION RATE: 16 BRPM | TEMPERATURE: 98.4 F | HEART RATE: 82 BPM | HEIGHT: 66 IN | OXYGEN SATURATION: 96 % | SYSTOLIC BLOOD PRESSURE: 160 MMHG | WEIGHT: 191.2 LBS | DIASTOLIC BLOOD PRESSURE: 94 MMHG | BODY MASS INDEX: 30.73 KG/M2

## 2020-07-10 DIAGNOSIS — E78.49 OTHER HYPERLIPIDEMIA: Primary | ICD-10-CM

## 2020-07-10 DIAGNOSIS — Z29.89 ALTITUDE SICKNESS PREVENTATIVE MEASURES: ICD-10-CM

## 2020-07-10 DIAGNOSIS — M67.479 MUCOUS CYST OF TOE: ICD-10-CM

## 2020-07-10 DIAGNOSIS — I10 BENIGN ESSENTIAL HYPERTENSION: ICD-10-CM

## 2020-07-10 LAB
ANION GAP SERPL CALCULATED.3IONS-SCNC: 10 MMOL/L (ref 3–14)
AST SERPL W P-5'-P-CCNC: 25 U/L (ref 13–39)
BUN SERPL-MCNC: 23 MG/DL (ref 7–25)
CALCIUM SERPL-MCNC: 9.4 MG/DL (ref 8.6–10.3)
CHLORIDE SERPL-SCNC: 107 MMOL/L (ref 98–107)
CHOLEST SERPL-MCNC: 149 MG/DL
CO2 SERPL-SCNC: 25 MMOL/L (ref 21–31)
CREAT SERPL-MCNC: 1.26 MG/DL (ref 0.7–1.3)
GFR SERPL CREATININE-BSD FRML MDRD: 57 ML/MIN/{1.73_M2}
GLUCOSE SERPL-MCNC: 77 MG/DL (ref 70–105)
HDLC SERPL-MCNC: 64 MG/DL (ref 23–92)
LDLC SERPL CALC-MCNC: 72 MG/DL
NONHDLC SERPL-MCNC: 85 MG/DL
POTASSIUM SERPL-SCNC: 4.3 MMOL/L (ref 3.5–5.1)
SODIUM SERPL-SCNC: 142 MMOL/L (ref 134–144)
TRIGL SERPL-MCNC: 66 MG/DL

## 2020-07-10 PROCEDURE — 99213 OFFICE O/P EST LOW 20 MIN: CPT | Performed by: INTERNAL MEDICINE

## 2020-07-10 PROCEDURE — 80061 LIPID PANEL: CPT | Mod: ZL | Performed by: INTERNAL MEDICINE

## 2020-07-10 PROCEDURE — 80048 BASIC METABOLIC PNL TOTAL CA: CPT | Mod: ZL | Performed by: INTERNAL MEDICINE

## 2020-07-10 PROCEDURE — G0463 HOSPITAL OUTPT CLINIC VISIT: HCPCS

## 2020-07-10 PROCEDURE — 36415 COLL VENOUS BLD VENIPUNCTURE: CPT | Mod: ZL | Performed by: INTERNAL MEDICINE

## 2020-07-10 PROCEDURE — 84450 TRANSFERASE (AST) (SGOT): CPT | Mod: ZL | Performed by: INTERNAL MEDICINE

## 2020-07-10 RX ORDER — ACETAZOLAMIDE 250 MG/1
250 TABLET ORAL 2 TIMES DAILY
Qty: 50 TABLET | Refills: 0 | Status: SHIPPED | OUTPATIENT
Start: 2020-07-10 | End: 2020-08-06

## 2020-07-10 ASSESSMENT — PAIN SCALES - GENERAL: PAINLEVEL: SEVERE PAIN (6)

## 2020-07-10 ASSESSMENT — ENCOUNTER SYMPTOMS
ALLERGIC/IMMUNOLOGIC NEGATIVE: 1
ENDOCRINE NEGATIVE: 1
CONSTITUTIONAL NEGATIVE: 1
EYES NEGATIVE: 1

## 2020-07-10 ASSESSMENT — MIFFLIN-ST. JEOR: SCORE: 1576.65

## 2020-07-10 NOTE — PROGRESS NOTES
Chief Complaint: He is here for a number of issues.    HPI: He comes in today for a few things.  First of all he wanted to make sure that I was aware of the fact that his prostate biopsies at the [facility] were negative for prostate cancer.  He will have a six-month follow-up at the [facility].    He needs a refill on acetazolamide for his prevention of acute mountain sickness.    He would like to have his lipid panel checked.  He is on a low-dose Crestor.  He would also like to have his renal panel rechecked.    He has a cyst on his toe.  This was resected at one point in time by Dr. Owen but has now returned.  He would like to have this removed.    Past Medical History:   Diagnosis Date     Elevated prostate specific antigen (PSA)     2106,psa 5.1     Hypertension      Osteoarthritis        Past Surgical History:   Procedure Laterality Date     ARTHROPLASTY KNEE Right 2017     ARTHROSCOPY KNEE Left 2012     [facility]ON[facility]  [facility]    [facility]     PROSTATE BIOPSY, NEEDLE, SATURATION SAMPLING  2017     PROSTATE BIOPSY, NEEDLE, SATURATION SAMPLING  05/2020    Benign     TONSILLECTOMY      No Comments Provided     VASECTOMY      No Comments Provided       No Known Allergies    Current Outpatient Medications   Medication Sig Dispense Refill     acetaZOLAMIDE (DIAMOX) 250 MG tablet Take 1 tablet (250 mg) by mouth 2 times daily Start 2 days prior to altitude 50 tablet 0     finasteride (PROSCAR) 5 MG tablet Take 5 mg by mouth daily        ibuprofen (ADVIL/MOTRIN) 800 MG tablet Take 1 tablet (800 mg) by mouth daily as needed for moderate pain       losartan (COZAAR) 25 MG tablet Take 12.5 mg by mouth daily       nabumetone (RELAFEN) 750 MG tablet Take 1 tablet (750 mg) by mouth 2 times daily as needed for moderate pain 60 tablet 3     rosuvastatin (CRESTOR) 20 MG tablet Take 0.5 tablets (10 mg) by mouth daily         Review of Systems   Constitutional: Negative.    Eyes: Negative.    Endocrine: Negative.     Allergic/Immunologic: Negative.        Physical Exam  Vitals signs and nursing note reviewed.   Constitutional:       General: He is not in acute distress.     Appearance: Normal appearance. He is not ill-appearing, toxic-appearing or diaphoretic.   Musculoskeletal:      Comments: Mucoid cyst on his right second toe   Neurological:      Mental Status: He is alert.         Assessment:        ICD-10-CM    1. Other hyperlipidemia  E78.49 Lipid Profile     AST   2. Altitude sickness preventative measures  Z29.8 acetaZOLAMIDE (DIAMOX) 250 MG tablet   3. Benign essential hypertension  I10 Basic Metabolic Panel   4. Mucous cyst of toe  M67.479 Orthopedic [facility] Spine  Referral       Plan: Prescription sent to his pharmacy.  Lab pending, I will send him a letter with the results.  Appointment with Dr. Colon to review the cyst.

## 2020-07-10 NOTE — LETTER
July 13, 2020      Ammon Harding  [address] [facility] [address]        Dear Ammon,    Below are the results of your recent labs:    Results for orders placed or performed in visit on 07/10/20   Basic Metabolic Panel     Status: Abnormal   Result Value Ref Range    Sodium 142 134 - 144 mmol/L    Potassium 4.3 3.5 - 5.1 mmol/L    Chloride 107 98 - 107 mmol/L    Carbon Dioxide 25 21 - 31 mmol/L    Anion Gap 10 3 - 14 mmol/L    Glucose 77 70 - 105 mg/dL    Urea Nitrogen 23 7 - 25 mg/dL    Creatinine 1.26 0.70 - 1.30 mg/dL    GFR Estimate 57 (L) >60 mL/min/[1.73_m2]    GFR Estimate If Black 68 >60 mL/min/[1.73_m2]    Calcium 9.4 8.6 - 10.3 mg/dL   AST     Status: None   Result Value Ref Range    AST 25 13 - 39 U/L   Lipid Profile     Status: None   Result Value Ref Range    Cholesterol 149 <200 mg/dL    Triglycerides 66 <150 mg/dL    HDL Cholesterol 64 23 - 92 mg/dL    LDL Cholesterol Calculated 72 <100 mg/dL    Non HDL Cholesterol 85 <130 mg/dL        Your blood tests look great.  Congratulations on this report.    Sincerely,        Chemo Ramos MD  Internal Medicine  [facility]

## 2020-07-10 NOTE — NURSING NOTE
Ammon Harding is a 69 year old male presenting today for: discuss [facility] visit, prostate biopsy, medication refill, cyst on toe  Medication Reconciliation: complete    Sujey Milan LPN 7/10/2020 3:39 PM

## 2020-07-30 ENCOUNTER — HOSPITAL ENCOUNTER (OUTPATIENT)
Dept: GENERAL RADIOLOGY | Facility: OTHER | Age: 70
End: 2020-07-30
Attending: PODIATRIST
Payer: MEDICARE

## 2020-07-30 ENCOUNTER — OFFICE VISIT (OUTPATIENT)
Dept: INTERNAL MEDICINE | Facility: OTHER | Age: 70
End: 2020-07-30
Attending: INTERNAL MEDICINE
Payer: MEDICARE

## 2020-07-30 ENCOUNTER — OFFICE VISIT (OUTPATIENT)
Dept: ORTHOPEDICS | Facility: OTHER | Age: 70
End: 2020-07-30
Attending: INTERNAL MEDICINE
Payer: MEDICARE

## 2020-07-30 VITALS
SYSTOLIC BLOOD PRESSURE: 136 MMHG | TEMPERATURE: 97 F | HEIGHT: 67 IN | WEIGHT: 189 LBS | RESPIRATION RATE: 16 BRPM | OXYGEN SATURATION: 97 % | HEART RATE: 56 BPM | BODY MASS INDEX: 29.66 KG/M2 | DIASTOLIC BLOOD PRESSURE: 86 MMHG

## 2020-07-30 VITALS
DIASTOLIC BLOOD PRESSURE: 90 MMHG | BODY MASS INDEX: 29.96 KG/M2 | WEIGHT: 188 LBS | HEART RATE: 60 BPM | SYSTOLIC BLOOD PRESSURE: 144 MMHG

## 2020-07-30 DIAGNOSIS — M19.91 PRIMARY OSTEOARTHRITIS, UNSPECIFIED SITE: ICD-10-CM

## 2020-07-30 DIAGNOSIS — M67.471 DIGITAL MUCOUS CYST OF TOE OF RIGHT FOOT: ICD-10-CM

## 2020-07-30 DIAGNOSIS — E78.49 OTHER HYPERLIPIDEMIA: ICD-10-CM

## 2020-07-30 DIAGNOSIS — N40.2 PROSTATE NODULE: ICD-10-CM

## 2020-07-30 DIAGNOSIS — I10 BENIGN ESSENTIAL HYPERTENSION: Primary | ICD-10-CM

## 2020-07-30 DIAGNOSIS — M67.479 MUCOUS CYST OF TOE: ICD-10-CM

## 2020-07-30 DIAGNOSIS — Z01.818 PREOPERATIVE EXAMINATION: ICD-10-CM

## 2020-07-30 PROBLEM — M67.40 GANGLION CYST: Status: RESOLVED | Noted: 2018-02-16 | Resolved: 2020-07-30

## 2020-07-30 LAB
HGB BLD-MCNC: 15.6 G/DL (ref 13.3–17.7)
INTERPRETATION ECG - MUSE: NORMAL

## 2020-07-30 PROCEDURE — 99215 OFFICE O/P EST HI 40 MIN: CPT | Performed by: INTERNAL MEDICINE

## 2020-07-30 PROCEDURE — 73630 X-RAY EXAM OF FOOT: CPT | Mod: RT

## 2020-07-30 PROCEDURE — 36415 COLL VENOUS BLD VENIPUNCTURE: CPT | Mod: ZL | Performed by: INTERNAL MEDICINE

## 2020-07-30 PROCEDURE — G0463 HOSPITAL OUTPT CLINIC VISIT: HCPCS

## 2020-07-30 PROCEDURE — 99202 OFFICE O/P NEW SF 15 MIN: CPT | Performed by: PODIATRIST

## 2020-07-30 PROCEDURE — 93010 ELECTROCARDIOGRAM REPORT: CPT | Performed by: INTERNAL MEDICINE

## 2020-07-30 PROCEDURE — 93005 ELECTROCARDIOGRAM TRACING: CPT

## 2020-07-30 PROCEDURE — G0463 HOSPITAL OUTPT CLINIC VISIT: HCPCS | Mod: 25,27

## 2020-07-30 PROCEDURE — G0463 HOSPITAL OUTPT CLINIC VISIT: HCPCS | Mod: 25

## 2020-07-30 PROCEDURE — 85018 HEMOGLOBIN: CPT | Mod: ZL | Performed by: INTERNAL MEDICINE

## 2020-07-30 ASSESSMENT — ENCOUNTER SYMPTOMS
PALPITATIONS: 0
SLEEP DISTURBANCE: 0
ABDOMINAL PAIN: 0
LIGHT-HEADEDNESS: 0
HALLUCINATIONS: 0
NERVOUS/ANXIOUS: 0
VOICE CHANGE: 0
SORE THROAT: 0
BLOOD IN STOOL: 0
CONSTIPATION: 0
EYE REDNESS: 0
WEAKNESS: 0
DIFFICULTY URINATING: 0
FATIGUE: 0
VOMITING: 0
UNEXPECTED WEIGHT CHANGE: 0
ADENOPATHY: 0
NUMBNESS: 0
DIZZINESS: 0
RHINORRHEA: 0
HEMATURIA: 0
APPETITE CHANGE: 0
FLANK PAIN: 0
NECK PAIN: 0
COUGH: 0
SHORTNESS OF BREATH: 0
FREQUENCY: 0
TREMORS: 0
CHEST TIGHTNESS: 0
TROUBLE SWALLOWING: 0
NAUSEA: 0
AGITATION: 0
CHILLS: 0
SPEECH DIFFICULTY: 0
ABDOMINAL DISTENTION: 0
CONFUSION: 0
BACK PAIN: 0
WOUND: 0
WHEEZING: 0
BRUISES/BLEEDS EASILY: 0
DIARRHEA: 0
NECK STIFFNESS: 0
SINUS PRESSURE: 0
DIAPHORESIS: 0
DYSURIA: 0
EYE PAIN: 0
HEADACHES: 0
JOINT SWELLING: 0
SINUS PAIN: 0
FEVER: 0
SEIZURES: 0

## 2020-07-30 ASSESSMENT — MIFFLIN-ST. JEOR: SCORE: 1567.99

## 2020-07-30 ASSESSMENT — PAIN SCALES - GENERAL: PAINLEVEL: NO PAIN (0)

## 2020-07-30 NOTE — PROGRESS NOTES
Chief Complaint:  This patient is here for a preop consultation and clearance.    HPI: Preoperative consultation and clearance is requested by Dr. Colon.  This patient is scheduled to have a cyst resected from his right second toe.  This will be done at [facility] on August 7.    The patient has a history of hypertension.  He is on single drug therapy for this and has good control of his blood pressure.  He has had some minimal hypertensive nephrosclerosis with stage II kidney disease as a result of this.  The patient also has treated hyperlipidemia and is on low intensity statin therapy.  He does not have any known vascular disease.  He has significant osteoarthritis which he occasionally takes anti-inflammatories.  He recently had a prostate biopsy that was negative for cancer.    He denies any acute illnesses including fevers, chills, sore throat or rash.  He denies shortness of breath or cough.  He denies chest pain, pressure or palpitations.  He denies any GI or  issues.    Medications are reconciled.  Past medical history, past surgical history, family history and social histories are reviewed and updated.  He has never had a blood transfusion.  He has never had an anesthesia complication.  He has never had a bleeding diathesis.    Past Medical History:   Diagnosis Date     Elevated prostate specific antigen (PSA)     2106,psa 5.1     Hyperlipidemia      Hypertension      Osteoarthritis        Past Surgical History:   Procedure Laterality Date     ARTHROPLASTY KNEE Right 2017     ARTHROSCOPY KNEE Left 2012     [facility]     PROSTATE BIOPSY, NEEDLE, SATURATION SAMPLING  2017     PROSTATE BIOPSY, NEEDLE, SATURATION SAMPLING  05/2020    Benign     TONSILLECTOMY      No Comments Provided     VASECTOMY      No Comments Provided       Current Outpatient Medications   Medication Sig Dispense Refill     acetaZOLAMIDE (DIAMOX) 250 MG tablet Take 1 tablet (250 mg) by mouth 2  times daily Start 2 days prior to altitude 50 tablet 0     finasteride (PROSCAR) 5 MG tablet Take 5 mg by mouth daily        ibuprofen (ADVIL/MOTRIN) 800 MG tablet Take 1 tablet (800 mg) by mouth daily as needed for moderate pain       losartan (COZAAR) 25 MG tablet Take 12.5 mg by mouth daily       nabumetone (RELAFEN) 750 MG tablet Take 1 tablet (750 mg) by mouth 2 times daily as needed for moderate pain 60 tablet 3     rosuvastatin (CRESTOR) 20 MG tablet Take 0.5 tablets (10 mg) by mouth daily         No Known Allergies    Family History   Problem Relation Age of Onset     Heart Failure Father      Dementia Father      Other - See Comments Mother         Dementia     Diabetes Brother      Heart Disease Other         Heart Disease,In distant relatives       Social History     Socioeconomic History     Marital status:      Spouse name: Not on file     Number of children: Not on file     Years of education: Not on file     Highest education level: Not on file   Occupational History     Not on file   Social Needs     Financial resource strain: Not on file     Food insecurity     Worry: Not on file     Inability: Not on file     Transportation needs     Medical: Not on file     Non-medical: Not on file   Tobacco Use     Smoking status: Current Every Day Smoker     Types: Pipe     Smokeless tobacco: Never Used     Tobacco comment: Quit smoking: refused   Substance and Sexual Activity     Alcohol use: Yes     Frequency: 4 or more times a week     Drinks per session: 1 or 2     Comment: 14 a week      Drug use: No     Comment: Drug use: No     Sexual activity: Yes     Partners: Female   Lifestyle     Physical activity     Days per week: Not on file     Minutes per session: Not on file     Stress: Not on file   Relationships     Social connections     Talks on phone: Not on file     Gets together: Not on file     Attends Rastafarian service: Not on file     Active member of club or organization: Not on file      Attends meetings of clubs or organizations: Not on file     Relationship status: Not on file     Intimate partner violence     Fear of current or ex partner: Not on file     Emotionally abused: Not on file     Physically abused: Not on file     Forced sexual activity: Not on file   Other Topics Concern     Not on file   Social History Narrative    He is .  He was  twice and .  He is a pipe smoker with occasional use of alcohol.  He was in the [employer] and was stationed a year in [place] and a year in [place].  He has been a .  He did go to [employer].  Retired from [employer]. Lives in [place].       Review of Systems   Constitutional: Negative for appetite change, chills, diaphoresis, fatigue, fever and unexpected weight change.   HENT: Negative for ear pain, rhinorrhea, sinus pressure, sinus pain, sore throat, trouble swallowing and voice change.    Eyes: Negative for pain, redness and visual disturbance.   Respiratory: Negative for cough, chest tightness, shortness of breath and wheezing.    Cardiovascular: Negative for chest pain, palpitations and leg swelling.   Gastrointestinal: Negative for abdominal distention, abdominal pain, blood in stool, constipation, diarrhea, nausea and vomiting.   Endocrine: Negative for cold intolerance and heat intolerance.   Genitourinary: Negative for difficulty urinating, dysuria, flank pain, frequency and hematuria.   Musculoskeletal: Negative for back pain, joint swelling, neck pain and neck stiffness.   Skin: Negative for rash and wound.   Allergic/Immunologic: Negative for immunocompromised state.   Neurological: Negative for dizziness, tremors, seizures, syncope, speech difficulty, weakness, light-headedness, numbness and headaches.   Hematological: Negative for adenopathy. Does not bruise/bleed easily.   Psychiatric/Behavioral: Negative for agitation, behavioral problems, confusion, hallucinations and sleep disturbance. The  patient is not nervous/anxious.        Physical Exam  Vitals signs and nursing note reviewed.   Constitutional:       General: He is not in acute distress.     Appearance: He is well-developed. He is not diaphoretic.   HENT:      Head: Normocephalic.      Right Ear: Tympanic membrane, ear canal and external ear normal.      Left Ear: Tympanic membrane, ear canal and external ear normal.      Nose: Nose normal.      Mouth/Throat:      Pharynx: No oropharyngeal exudate.   Eyes:      Conjunctiva/sclera: Conjunctivae normal.      Pupils: Pupils are equal, round, and reactive to light.   Neck:      Musculoskeletal: Normal range of motion and neck supple.      Thyroid: No thyroid mass or thyromegaly.      Vascular: Normal carotid pulses. No carotid bruit or JVD.      Trachea: No tracheal deviation.   Cardiovascular:      Rate and Rhythm: Normal rate and regular rhythm.      Heart sounds: Normal heart sounds. No murmur. No friction rub. No gallop.    Pulmonary:      Effort: Pulmonary effort is normal. No respiratory distress.      Breath sounds: Normal breath sounds. No stridor. No decreased breath sounds, wheezing, rhonchi or rales.   Abdominal:      General: Bowel sounds are normal. There is no distension.      Palpations: Abdomen is soft. There is no mass.      Tenderness: There is no abdominal tenderness. There is no guarding or rebound.      Hernia: No hernia is present.   Musculoskeletal: Normal range of motion.      Right lower leg: No edema.      Left lower leg: No edema.   Lymphadenopathy:      Cervical: No cervical adenopathy.   Skin:     General: Skin is warm and dry.      Findings: No rash.   Neurological:      Mental Status: He is alert and oriented to person, place, and time.      Cranial Nerves: No cranial nerve deficit.      Sensory: No sensory deficit.      Motor: No abnormal muscle tone.      Coordination: Coordination normal.      Deep Tendon Reflexes: Reflexes normal.       Results for orders placed or  performed in visit on 07/30/20   Hemoglobin     Status: None   Result Value Ref Range    Hemoglobin 15.6 13.3 - 17.7 g/dL   EKG 12-lead complete w/read - Clinics     Status: None (Preliminary result)   Result Value Ref Range    Interpretation ECG Click View Image link to view waveform and result    Results for orders placed or performed during the hospital encounter of 07/30/20   XR Foot 3 Views Standing Right     Status: None    Narrative    Exam: XR FOOT 3 VIEWS STANDING RIGHT, 7/30/2020 9:55 AM    Indication: Mucous cyst of toe    Comparison: None    Findings:   Lateral view: Unremarkable.    There is moderate degenerative changes at first metatarsophalangeal  joint. No fractures or dislocations seen.      Impression    Impression:   1. Moderate osteoarthritis of the first metatarsophalangeal joint.  2. Otherwise negative.    YURY SANTIAGO MD     Assessment:      ICD-10-CM    1. Benign essential hypertension  I10    2. Preoperative examination  Z01.818 EKG 12-lead complete w/read - Clinics     Hemoglobin     Hemoglobin   3. Other hyperlipidemia  E78.49    4. Primary osteoarthritis, unspecified site  M19.91    5. Digital mucous cyst of toe of right foot  M67.471    6. Prostate nodule  N40.2         Plan: This patient has a mucoid cyst on his toe.  This will be removed on August 7.  His exam today is unremarkable.  EKG shows a right bundle branch block which is unchanged.  Hemoglobin is normal.  He is okay for his planned surgical procedure without contraindication.  The morning of the procedure, he will only take his losartan with a small sip of water.  Usual medications can be reinstituted postoperatively.  He is instructed not to take any of his anti-inflammatories between now and the time of surgery.

## 2020-07-30 NOTE — PROGRESS NOTES
Patient is here for consult on his right foot cyst.   Linda Machado LPN .....................7/30/2020 9:44 AM

## 2020-07-30 NOTE — PROGRESS NOTES
Visit Date:   07/30/2020      HISTORY OF PRESENT ILLNESS:  Ammon is a 69-year-old gentleman here to see me regarding a synovial cyst over his second toe.  He had it removed by Dr. Owen a couple years ago and it has recurred.  He has pain associated with it and would like to consider removal of this.  He has an elk hunting trip in early September and one shortly after that in [place] and would like to have it removed prior if able.      PHYSICAL EXAMINATION:     CONSTITUTIONAL:  The patient is alert and oriented x3, well appearing and in no apparent distress.  Affect is pleasant and answers questions appropriately.   VASCULAR:  Circulation is intact with palpable pedal pulses and adequate capillary fill time to all digits.  Hair growth is present and appropriate to mid foot and digits.   NEUROLOGIC:  Light touch sensation is intact to digits.  There is a negative Tinel sign.  There are no signs of apparent nerve entrapment of superficial peroneal or common peroneal nerves.   INTEGUMENT:  No abnormal dermatologic lesions are noted.  Skin has normal texture and turgor.     MUSCULOSKELETAL:  Fairly rectus foot type.  Does have a palpable tender joint synovial cyst overlying the DIPJ of second toe.  Mild contractures of the DIPJ is appreciated.  Hindfoot if well aligned.  He is ambulatory in normal shoegear otherwise.      IMAGING:  Three views of the right foot were taken.  No obvious acute osseous abnormality.      ASSESSMENT:  Painful synovial second toe cyst with history of previous removal and recurrence associated with the DIP joint of the toe.      PLAN:  I discussed condition and treatment with the patient today.  Given recurrence and previous removal, I did recommend excision and DIPJ arthroplasty, which should keep it from coming back.  We discussed this in detail including recovery, risks, potential complications, alternatives and benefits.  He just had a checkup.  He is healthy, has no heart and lung issues  that he knows of.  I will schedule this in [place] here in the next week or so for his convenience to get this done as soon as possible under MAC anesthesia only.  Follow up accordingly postop.         CHAZ GAVIRIA DPM             D: 2020   T: 2020   MT: BRENT      Name:     PACO RENEE   MRN:      [id number]        Account:      [id number]   :      1950           Visit Date:   2020      Document: [id number]

## 2020-07-30 NOTE — LETTER
July 30, 2020      Ammon Harding  [address] [facility] [address]        Dear Ammon,    Below are the results of your recent labs:    Results for orders placed or performed in visit on 07/30/20   Hemoglobin     Status: None   Result Value Ref Range    Hemoglobin 15.6 13.3 - 17.7 g/dL   EKG 12-lead complete w/read - Clinics     Status: None (Preliminary result)   Result Value Ref Range    Interpretation ECG Click View Image link to view waveform and result    Results for orders placed or performed during the hospital encounter of 07/30/20   XR Foot 3 Views Standing Right     Status: None    Narrative    Exam: XR FOOT 3 VIEWS STANDING RIGHT, 7/30/2020 9:55 AM    Indication: Mucous cyst of toe    Comparison: None    Findings:   Lateral view: Unremarkable.    There is moderate degenerative changes at first metatarsophalangeal  joint. No fractures or dislocations seen.      Impression    Impression:   1. Moderate osteoarthritis of the first metatarsophalangeal joint.  2. Otherwise negative.    YURY SANTIAGO MD        Your hemoglobin blood test has returned and is normal.      Sincerely,        Chemo Ramos MD  Internal Medicine  [facility]

## 2020-07-30 NOTE — NURSING NOTE
"Chief Complaint   Patient presents with     Pre-Op Exam       Initial /86 (BP Location: Right arm, Patient Position: Sitting, Cuff Size: Adult Large)   Pulse 56   Temp 97  F (36.1  C) (Tympanic)   Resp 16   Ht 1.689 m (5' 6.5\")   Wt 85.7 kg (189 lb)   SpO2 97%   BMI 30.05 kg/m   Estimated body mass index is 30.05 kg/m  as calculated from the following:    Height as of this encounter: 1.689 m (5' 6.5\").    Weight as of this encounter: 85.7 kg (189 lb).  Medication Reconciliation: complete    Mamta Barron LPN  "

## 2020-08-04 DIAGNOSIS — Z29.89 ALTITUDE SICKNESS PREVENTATIVE MEASURES: Primary | ICD-10-CM

## 2020-08-06 RX ORDER — ACETAZOLAMIDE 250 MG/1
TABLET ORAL
Qty: 50 TABLET | Refills: 0 | Status: SHIPPED | OUTPATIENT
Start: 2020-08-06 | End: 2022-12-15

## 2020-08-06 NOTE — TELEPHONE ENCOUNTER
[facility] sent Rx request for the following:      Requested Prescriptions   Pending Prescriptions Disp Refills   acetaZOLAMIDE (DIAMOX) 250 MG tablet [Pharmacy Med Name: ACETAZOLAMIDE 250MG TABLETS] 50 tablet 0    Sig: TAKE 1 TABLET(250 MG) BY MOUTH TWICE DAILY. START 2 DAYS BEFORE ALTITUDE   Last Prescription Date:   7/10/20  Last Fill Qty/Refills:         50, R-0    Last Office Visit:              7/30/20  Future Office visit:           None  Routing refill request to provider for review/approval because:  Drug not on the G, P or [facility] refill protocol or controlled substance    In clinical absence of patient's primary, Chemo Ramos, patient is requesting that this message be sent to the primary provider's Teamlet for consideration please. He is scheduled to return Monday 8/10/20.    Unable to complete prescription refill per RN Medication Refill Policy. Karishma Rome RN .............. 8/6/2020  12:19 PM

## 2020-08-13 ENCOUNTER — OFFICE VISIT (OUTPATIENT)
Dept: ORTHOPEDICS | Facility: OTHER | Age: 70
End: 2020-08-13
Attending: PODIATRIST
Payer: MEDICARE

## 2020-08-13 DIAGNOSIS — Z09 POSTOP CHECK: Primary | ICD-10-CM

## 2020-08-13 PROCEDURE — G0463 HOSPITAL OUTPT CLINIC VISIT: HCPCS

## 2020-08-13 PROCEDURE — 99024 POSTOP FOLLOW-UP VISIT: CPT | Performed by: PODIATRIST

## 2020-08-13 NOTE — PROGRESS NOTES
Visit Date:   2020      HISTORY OF PRESENT ILLNESS:  Ammon comes in today status post right second toe partial resection of intermediate phalanx and cyst excision of the second toe performed by Dr. Colon on 2020.  Ammon states that he is doing quite well.  He is not having any discomfort at this time.  He comes in with a postop shoe and a dressing.        PHYSICAL EXAMINATION:  The dressing was removed.  Sutures were intact.  Those were removed and Steri-Stripped.  He has minimal swelling and denies any numbness or tingling.  He has good range of motion.      ASSESSMENT:  Status post right second toe partial resection of intermediate phalanx and cyst excision of the second toe, doing well.      PLAN:  Have him progress to a regular shoe and do active ADLs as tolerated and follow up on a p.r.n. basis.         CHAZ COLON DPM       As dictated by KENAN KONG            D: 2020   T: 2020   MT: BRENT      Name:     AMMON RENEE   MRN:      [id number]        Account:      [id number]   :      1950           Visit Date:   2020      Document: [id number]

## 2020-08-13 NOTE — PROGRESS NOTES
Patient is here for follow up on his right foot.  Linda Machado LPN .....................8/13/2020 12:21 PM

## 2020-12-15 ENCOUNTER — ALLIED HEALTH/NURSE VISIT (OUTPATIENT)
Dept: FAMILY MEDICINE | Facility: OTHER | Age: 70
End: 2020-12-15
Attending: INTERNAL MEDICINE
Payer: MEDICARE

## 2020-12-15 DIAGNOSIS — Z23 NEED FOR PROPHYLACTIC VACCINATION AND INOCULATION AGAINST INFLUENZA: Primary | ICD-10-CM

## 2020-12-15 PROCEDURE — 90662 IIV NO PRSV INCREASED AG IM: CPT

## 2020-12-15 PROCEDURE — G0008 ADMIN INFLUENZA VIRUS VAC: HCPCS

## 2020-12-15 NOTE — PROGRESS NOTES
Influenza Vaccination: Adult  Verified patient's name and . Patient stated reason for visit today is to receive Flu vaccine. Patient denied any concerns with previous influenza vaccinations. Influenza vaccination screening questions answered (see IMMUNIZATIONS for answers). Allergies reviewed. Influenza vaccine order placed per standing order. VIS handout reviewed and given to patient to take home. Influenza prepared and administered IM per standing order. Administration documented in IMMUNIZATIONS (see flowsheet and order for further information).     Aleksandra PICKARDN, RN on 12/15/2020 at 2:54 PM

## 2020-12-22 ENCOUNTER — MYC MEDICAL ADVICE (OUTPATIENT)
Dept: INTERNAL MEDICINE | Facility: OTHER | Age: 70
End: 2020-12-22

## 2020-12-22 DIAGNOSIS — R97.20 ELEVATED PSA: Primary | ICD-10-CM

## 2020-12-22 NOTE — TELEPHONE ENCOUNTER
Needs a follow up PSA test. Scheduled a lab only appointment   Alannah Wells LPN on 12/22/2020 at 3:44 PM

## 2020-12-23 DIAGNOSIS — R97.20 ELEVATED PSA: ICD-10-CM

## 2020-12-23 LAB — PSA SERPL-MCNC: 3.94 NG/ML

## 2020-12-23 PROCEDURE — 36415 COLL VENOUS BLD VENIPUNCTURE: CPT | Mod: ZL | Performed by: INTERNAL MEDICINE

## 2020-12-23 PROCEDURE — 84153 ASSAY OF PSA TOTAL: CPT | Mod: ZL | Performed by: INTERNAL MEDICINE

## 2021-01-27 ENCOUNTER — MYC MEDICAL ADVICE (OUTPATIENT)
Dept: INTERNAL MEDICINE | Facility: OTHER | Age: 71
End: 2021-01-27

## 2021-02-10 DIAGNOSIS — Z86.0100 HISTORY OF COLONIC POLYPS: Primary | ICD-10-CM

## 2021-02-11 DIAGNOSIS — Z00.00 HEALTHCARE MAINTENANCE: Primary | ICD-10-CM

## 2021-02-11 RX ORDER — POLYETHYLENE GLYCOL 3350, SODIUM CHLORIDE, SODIUM BICARBONATE, POTASSIUM CHLORIDE 420; 11.2; 5.72; 1.48 G/4L; G/4L; G/4L; G/4L
4000 POWDER, FOR SOLUTION ORAL ONCE
Qty: 4000 ML | Refills: 0 | Status: SHIPPED | OUTPATIENT
Start: 2021-02-11 | End: 2021-02-11

## 2021-02-11 RX ORDER — BISACODYL 5 MG
TABLET, DELAYED RELEASE (ENTERIC COATED) ORAL
Qty: 2 TABLET | Refills: 0 | Status: SHIPPED | OUTPATIENT
Start: 2021-02-11 | End: 2021-05-15

## 2021-02-11 NOTE — TELEPHONE ENCOUNTER
Screening Questions for the Scheduling of Screening Colonoscopies   (If Colonoscopy is diagnostic, Provider should review the chart before scheduling.)  Are you younger than 50 or older than 80?   NO   Do you take aspirin or fish oil?  YES - ASPIRIN    (if yes, tell patient to stop 1 week prior to Colonoscopy)  Do you take warfarin (Coumadin), clopidogrel (Plavix), apixaban (Eliquis), dabigatram (Pradaxa), rivaroxaban (Xarelto) or any blood thinner? NO   Do you use oxygen at home?  NO   Do you have kidney disease? NO   Are you on dialysis? NO   Have you had a stroke or heart attack in the last year? NO   Have you had a stent in your heart or any blood vessel in the last year? NO   Have you had a transplant of any organ? NO   Have you had a colonoscopy or upper endoscopy (EGD) before? YES          When?  3 YRS AGO   Date of scheduled Colonoscopy. 03/22/2021  Provider ALYSIA   Pharmacy [facility]

## 2021-03-16 PROBLEM — Z86.0101 H/O ADENOMATOUS POLYP OF COLON: Status: ACTIVE | Noted: 2021-03-16

## 2021-03-16 PROBLEM — N40.0 BENIGN PROSTATIC HYPERPLASIA WITHOUT URINARY OBSTRUCTION: Status: ACTIVE | Noted: 2020-03-16

## 2021-03-18 ENCOUNTER — ALLIED HEALTH/NURSE VISIT (OUTPATIENT)
Dept: FAMILY MEDICINE | Facility: OTHER | Age: 71
End: 2021-03-18
Attending: SURGERY
Payer: MEDICARE

## 2021-03-18 DIAGNOSIS — Z00.00 HEALTHCARE MAINTENANCE: ICD-10-CM

## 2021-03-18 PROCEDURE — U0005 INFEC AGEN DETEC AMPLI PROBE: HCPCS | Mod: ZL | Performed by: SURGERY

## 2021-03-18 PROCEDURE — U0003 INFECTIOUS AGENT DETECTION BY NUCLEIC ACID (DNA OR RNA); SEVERE ACUTE RESPIRATORY SYNDROME CORONAVIRUS 2 (SARS-COV-2) (CORONAVIRUS DISEASE [COVID-19]), AMPLIFIED PROBE TECHNIQUE, MAKING USE OF HIGH THROUGHPUT TECHNOLOGIES AS DESCRIBED BY CMS-2020-01-R: HCPCS | Mod: ZL | Performed by: SURGERY

## 2021-03-18 PROCEDURE — C9803 HOPD COVID-19 SPEC COLLECT: HCPCS

## 2021-03-19 LAB
LABORATORY COMMENT REPORT: NORMAL
SARS-COV-2 RNA RESP QL NAA+PROBE: NEGATIVE
SPECIMEN SOURCE: NORMAL

## 2021-03-22 ENCOUNTER — ANESTHESIA (OUTPATIENT)
Dept: SURGERY | Facility: OTHER | Age: 71
End: 2021-03-22
Payer: MEDICARE

## 2021-03-22 ENCOUNTER — HOSPITAL ENCOUNTER (OUTPATIENT)
Facility: OTHER | Age: 71
Discharge: HOME | End: 2021-03-22
Attending: SURGERY | Admitting: SURGERY
Payer: MEDICARE

## 2021-03-22 ENCOUNTER — ANESTHESIA EVENT (OUTPATIENT)
Dept: SURGERY | Facility: OTHER | Age: 71
End: 2021-03-22
Payer: MEDICARE

## 2021-03-22 VITALS
HEIGHT: 65 IN | SYSTOLIC BLOOD PRESSURE: 152 MMHG | HEART RATE: 61 BPM | DIASTOLIC BLOOD PRESSURE: 94 MMHG | TEMPERATURE: 97.3 F | OXYGEN SATURATION: 96 % | BODY MASS INDEX: 31.45 KG/M2

## 2021-03-22 PROBLEM — K63.5 COLON POLYPS: Status: ACTIVE | Noted: 2021-03-22

## 2021-03-22 PROCEDURE — 45384 COLONOSCOPY W/LESION REMOVAL: CPT | Mod: PT | Performed by: SURGERY

## 2021-03-22 PROCEDURE — 45384 COLONOSCOPY W/LESION REMOVAL: CPT | Performed by: NURSE ANESTHETIST, CERTIFIED REGISTERED

## 2021-03-22 PROCEDURE — 258N000003 HC RX IP 258 OP 636: Performed by: NURSE ANESTHETIST, CERTIFIED REGISTERED

## 2021-03-22 PROCEDURE — 250N000011 HC RX IP 250 OP 636: Performed by: NURSE ANESTHETIST, CERTIFIED REGISTERED

## 2021-03-22 PROCEDURE — 99100 ANES PT EXTEME AGE<1 YR&>70: CPT | Performed by: NURSE ANESTHETIST, CERTIFIED REGISTERED

## 2021-03-22 PROCEDURE — 250N000013 HC RX MED GY IP 250 OP 250 PS 637: Mod: GY | Performed by: SURGERY

## 2021-03-22 PROCEDURE — 250N000009 HC RX 250: Performed by: SURGERY

## 2021-03-22 PROCEDURE — 88305 TISSUE EXAM BY PATHOLOGIST: CPT

## 2021-03-22 PROCEDURE — 45380 COLONOSCOPY AND BIOPSY: CPT | Performed by: SURGERY

## 2021-03-22 PROCEDURE — 258N000003 HC RX IP 258 OP 636: Performed by: SURGERY

## 2021-03-22 RX ORDER — LIDOCAINE 40 MG/G
CREAM TOPICAL
Status: DISCONTINUED | OUTPATIENT
Start: 2021-03-22 | End: 2021-03-22 | Stop reason: HOSPADM

## 2021-03-22 RX ORDER — SODIUM CHLORIDE, SODIUM LACTATE, POTASSIUM CHLORIDE, CALCIUM CHLORIDE 600; 310; 30; 20 MG/100ML; MG/100ML; MG/100ML; MG/100ML
INJECTION, SOLUTION INTRAVENOUS CONTINUOUS PRN
Status: DISCONTINUED | OUTPATIENT
Start: 2021-03-22 | End: 2021-03-22

## 2021-03-22 RX ORDER — PROPOFOL 10 MG/ML
INJECTION, EMULSION INTRAVENOUS CONTINUOUS PRN
Status: DISCONTINUED | OUTPATIENT
Start: 2021-03-22 | End: 2021-03-22

## 2021-03-22 RX ORDER — SIMETHICONE 40MG/0.6ML
SUSPENSION, DROPS(FINAL DOSAGE FORM)(ML) ORAL PRN
Status: DISCONTINUED | OUTPATIENT
Start: 2021-03-22 | End: 2021-03-22 | Stop reason: HOSPADM

## 2021-03-22 RX ORDER — NALOXONE HYDROCHLORIDE 0.4 MG/ML
0.2 INJECTION, SOLUTION INTRAMUSCULAR; INTRAVENOUS; SUBCUTANEOUS
Status: DISCONTINUED | OUTPATIENT
Start: 2021-03-22 | End: 2021-03-22 | Stop reason: HOSPADM

## 2021-03-22 RX ORDER — SODIUM CHLORIDE, SODIUM LACTATE, POTASSIUM CHLORIDE, CALCIUM CHLORIDE 600; 310; 30; 20 MG/100ML; MG/100ML; MG/100ML; MG/100ML
INJECTION, SOLUTION INTRAVENOUS CONTINUOUS
Status: DISCONTINUED | OUTPATIENT
Start: 2021-03-22 | End: 2021-03-22 | Stop reason: HOSPADM

## 2021-03-22 RX ORDER — NALOXONE HYDROCHLORIDE 0.4 MG/ML
0.4 INJECTION, SOLUTION INTRAMUSCULAR; INTRAVENOUS; SUBCUTANEOUS
Status: DISCONTINUED | OUTPATIENT
Start: 2021-03-22 | End: 2021-03-22 | Stop reason: HOSPADM

## 2021-03-22 RX ORDER — FLUMAZENIL 0.1 MG/ML
0.2 INJECTION, SOLUTION INTRAVENOUS
Status: DISCONTINUED | OUTPATIENT
Start: 2021-03-22 | End: 2021-03-22 | Stop reason: HOSPADM

## 2021-03-22 RX ORDER — PROPOFOL 10 MG/ML
INJECTION, EMULSION INTRAVENOUS PRN
Status: DISCONTINUED | OUTPATIENT
Start: 2021-03-22 | End: 2021-03-22

## 2021-03-22 RX ORDER — ONDANSETRON 2 MG/ML
4 INJECTION INTRAMUSCULAR; INTRAVENOUS
Status: DISCONTINUED | OUTPATIENT
Start: 2021-03-22 | End: 2021-03-22 | Stop reason: HOSPADM

## 2021-03-22 RX ADMIN — PROPOFOL 130 MCG/KG/MIN: 10 INJECTION, EMULSION INTRAVENOUS at 09:39

## 2021-03-22 RX ADMIN — SODIUM CHLORIDE, POTASSIUM CHLORIDE, SODIUM LACTATE AND CALCIUM CHLORIDE: 600; 310; 30; 20 INJECTION, SOLUTION INTRAVENOUS at 09:35

## 2021-03-22 RX ADMIN — PROPOFOL 70 MG: 10 INJECTION, EMULSION INTRAVENOUS at 09:39

## 2021-03-22 RX ADMIN — SODIUM CHLORIDE, POTASSIUM CHLORIDE, SODIUM LACTATE AND CALCIUM CHLORIDE: 600; 310; 30; 20 INJECTION, SOLUTION INTRAVENOUS at 09:26

## 2021-03-22 ASSESSMENT — LIFESTYLE VARIABLES: TOBACCO_USE: 1

## 2021-03-22 NOTE — OP NOTE
PROCEDURE NOTE    DATE OF SERVICE: 3/22/2021    SURGEON: Nav Kilgore MD    PRE-OP DIAGNOSIS:      History of Polyps        POST-OP DIAGNOSIS:  Same  Polyps at cecum, distal TC and rectum at 10 cm    PROCEDURE:   Colonoscopy with hot biopsy      ANESTHESIA:  MAC D Kellerman CRNA    INDICATION FOR THE PROCEDURE: The patient is a 70 year old male with h/o polyps . The patient has no other complaints  . After explaining the risks to include bleeding, perforation, potential inability toreach the cecum, the patient wished to proceed.    PROCEDURE:After adequate sedation, the patient was in the left lateral decubitus position.  Rectal exam was performed.  There was normal tone and no palpable masses .  The colonoscope was introduced into the rectum and advanced to the cecum with Mild difficulty.  The patient's prep was excellent.  The terminal cecum was reached.  The cecum, ascending, transverse, descending and sigmoid colon was with small ( under 0.5 cm ) polyps of cecum x2, distal TC and rectum at 10 cm x 2 that were hot biopsied and destroyed  .  The scope was retroflexed in the rectum.  The rectum was unremarkable  .  The scope was straightened and removed.  The patient tolerated the procedure well.     ESTIMATED BLOOD LOSS: none    COMPLICATIONS:  None    TISSUE REMOVED:  Yes    RECOMMEND:      Follow-up pending pathology      Nav Kilgore MD FACS

## 2021-03-22 NOTE — ANESTHESIA CARE TRANSFER NOTE
Patient: Ammon Harding    Procedure(s):  COLONOSCOPY, WITH POLYPECTOMY AND BIOPSY    Diagnosis: History of colon polyps [Z86.010]  Diagnosis Additional Information: No value filed.    Anesthesia Type:   MAC     Note:      Level of Consciousness: drowsy  Oxygen Supplementation: face mask (Transported in O2 @ 8 L/min)  Level of Supplemental Oxygen (L/min / FiO2): 98%  Independent Airway: airway patency satisfactory and stable  Dentition: dentition unchanged  Vital Signs Stable: post-procedure vital signs reviewed and stable  Report to RN Given: handoff report given  Patient transferred to: Phase II    Handoff Report: Identifed the Patient, Identified the Reponsible Provider, Reviewed the pertinent medical history, Discussed the surgical course, Reviewed Intra-OP anesthesia mangement and issues during anesthesia, Set expectations for post-procedure period and Allowed opportunity for questions and acknowledgement of understanding      Vitals: (Last set prior to Anesthesia Care Transfer)  CRNA VITALS  3/22/2021 0933 - 3/22/2021 1008      3/22/2021             Resp Rate (set):  10        Electronically Signed By: David Kellerman, APRN CRNA  March 22, 2021  10:08 AM

## 2021-03-22 NOTE — ANESTHESIA PREPROCEDURE EVALUATION
Anesthesia Pre-Procedure Evaluation    Patient: Ammon Harding   MRN: [id number] : 1950        Preoperative Diagnosis: History of colon polyps [Z86.010]   Procedure : Procedure(s):  COLONOSCOPY     Past Medical History:   Diagnosis Date     Elevated prostate specific antigen (PSA)     ,psa 5.1     Hyperlipidemia      Hypertension      Osteoarthritis       Past Surgical History:   Procedure Laterality Date     ARTHROPLASTY KNEE Right      ARTHROSCOPY KNEE Left      [place]     PROSTATE BIOPSY, NEEDLE, SATURATION SAMPLING       PROSTATE BIOPSY, NEEDLE, SATURATION SAMPLING  2020    Benign     TONSILLECTOMY      No Comments Provided     VASECTOMY      No Comments Provided      No Known Allergies   Social History     Tobacco Use     Smoking status: Current Every Day Smoker     Types: Pipe     Smokeless tobacco: Never Used     Tobacco comment: Quit smoking: refused   Substance Use Topics     Alcohol use: Yes     Frequency: 4 or more times a week     Drinks per session: 1 or 2     Comment: 14 a week       Wt Readings from Last 1 Encounters:   20 85.7 kg (189 lb)        Anesthesia Evaluation   Pt has had prior anesthetic.     No history of anesthetic complications       ROS/MED HX  ENT/Pulmonary: Comment: Smokes a pipe 10-15 times/day - neg pulmonary ROS   (+) tobacco use, Current use, 1 packs/day,     Neurologic:  - neg neurologic ROS     Cardiovascular:     (+) Dyslipidemia hypertension-----    METS/Exercise Tolerance: >4 METS    Hematologic:  - neg hematologic  ROS     Musculoskeletal:  - neg musculoskeletal ROS     GI/Hepatic:     (+) GERD,     Renal/Genitourinary:     (+) renal disease, type: CRI,     Endo:  - neg endo ROS     Psychiatric/Substance Use:  - neg psychiatric ROS     Infectious Disease:  - neg infectious disease ROS     Malignancy:  - neg malignancy ROS     Other:  - neg other ROS          Physical Exam    Airway        Mallampati: II   TM  distance: > 3 FB   Neck ROM: full   Mouth opening: > 3 cm    Respiratory Devices and Support         Dental       (+) missing      Cardiovascular   cardiovascular exam normal       Rhythm and rate: regular and normal     Pulmonary   pulmonary exam normal        breath sounds clear to auscultation           OUTSIDE LABS:  CBC:   Lab Results   Component Value Date    HGB 15.6 07/30/2020    HGB 14.7 06/25/2019    HCT 38.5 10/19/2017    HCT 39.3 10/17/2017     (L) 10/19/2017     10/17/2017     BMP:   Lab Results   Component Value Date     07/10/2020     10/16/2019    POTASSIUM 4.3 07/10/2020    POTASSIUM 4.7 10/16/2019    CHLORIDE 107 07/10/2020    CHLORIDE 106 10/16/2019    CO2 25 07/10/2020    CO2 25 10/16/2019    BUN 23 07/10/2020    BUN 26 (H) 10/16/2019    CR 1.26 07/10/2020    CR 1.24 10/16/2019    GLC 77 07/10/2020     (H) 10/16/2019     COAGS:   Lab Results   Component Value Date    INR 1.0 01/23/2017     POC: No results found for: BGM, HCG, HCGS  HEPATIC:   Lab Results   Component Value Date    AST 25 07/10/2020     OTHER:   Lab Results   Component Value Date    LACT 1.0 10/17/2017    MANISH 9.4 07/10/2020       Anesthesia Plan    ASA Status:  2   NPO Status:  NPO Appropriate    Anesthesia Type: MAC.   Induction: Propofol.           Consents    Anesthesia Plan(s) and associated risks, benefits, and realistic alternatives discussed. Questions answered and patient/representative(s) expressed understanding.     - Discussed with:  Patient      - Extended Intubation/Ventilatory Support Discussed: No.      - Patient is DNR/DNI Status: No    Use of blood products discussed: Yes.     - Discussed with: Patient.     - Consented: consented to blood products     Postoperative Care            Comments:                SHIMA BENJAMIN CRNA

## 2021-03-22 NOTE — DISCHARGE INSTRUCTIONS
[facility]  Adult Discharge Orders & Instructions            For 12 hours after surgery  1. Get plenty of rest.  A responsible adult must stay with you for at least 12 hours after you leave the hospital.   2. You may feel lightheaded.  IF so, sit for a few minutes before standing.  Have someone help you get up.   3. You may have a slight fever. Call the doctor if your fever is over 101 F (38.3 C) (taken under the tongue) or lasts longer than 24 hours.  4. You may have a dry mouth, a sore throat, muscle aches or trouble sleeping.  These should go away after 24 hours.  5. Do not make important or legal decisions.  6.   Do not drive or use heavy equipment.  If you have weakness or tingling, don't drive or use heavy equipment until this feeling goes away.    To contact a doctor, call   [phone]

## 2021-03-22 NOTE — ANESTHESIA POSTPROCEDURE EVALUATION
Patient: Ammon Harding    Procedure(s):  COLONOSCOPY, WITH POLYPECTOMY AND BIOPSY    Diagnosis:History of colon polyps [Z86.010]  Diagnosis Additional Information: No value filed.    Anesthesia Type:  MAC    Note:  Disposition: Outpatient   Postop Pain Control: Uneventful            Sign Out: Well controlled pain   PONV: No   Neuro/Psych: Uneventful            Sign Out: Acceptable/Baseline neuro status   Airway/Respiratory: Uneventful            Sign Out: Acceptable/Baseline resp. status   CV/Hemodynamics: Uneventful            Sign Out: Acceptable CV status   Other NRE: NONE   DID A NON-ROUTINE EVENT OCCUR? No         Last vitals:  Vitals:    03/22/21 0908 03/22/21 1006 03/22/21 1015   BP: (!) 143/77 117/71    Pulse: 63 67    Temp: 97.1  F (36.2  C) 97.3  F (36.3  C)    SpO2: 96% 99% 93%       Last vitals prior to Anesthesia Care Transfer:  CRNA VITALS  3/22/2021 0933 - 3/22/2021 1033      3/22/2021             Resp Rate (set):  10          Electronically Signed By: SHIMA BENJAMIN CRNA  March 22, 2021  10:34 AM

## 2021-03-22 NOTE — H&P
"History and Physical    CHIEF COMPLAINT / REASON FOR PROCEDURE:  H/o polyps    PERTINENT HISTORY   Patient is a 70 year old male who presents today for colonoscopy h/o polyps.   Last colonoscopy 2016 at [facility].  Patient has no complaints.    Past Medical History:   Diagnosis Date     Elevated prostate specific antigen (PSA)     2106,psa 5.1     Hyperlipidemia      Hypertension      Osteoarthritis      Past Surgical History:   Procedure Laterality Date     ARTHROPLASTY KNEE Right 2017     ARTHROSCOPY KNEE Left 2012     [place]     PROSTATE BIOPSY, NEEDLE, SATURATION SAMPLING  2017     PROSTATE BIOPSY, NEEDLE, SATURATION SAMPLING  05/2020    Benign     TONSILLECTOMY      No Comments Provided     VASECTOMY      No Comments Provided       Bleeding tendencies:  No    ALLERGIES/SENSITIVITIES: No Known Allergies     CURRENT MEDICATIONS:    Prior to Admission medications    Medication Sig Start Date End Date Taking? Authorizing Provider   bisacodyl (DULCOLAX) 5 MG EC tablet Use as directed per colonoscopy prep. 2/11/21  Yes Nav Kilgore MD   finasteride (PROSCAR) 5 MG tablet Take 5 mg by mouth daily  6/24/19  Yes Reported, Patient   ibuprofen (ADVIL/MOTRIN) 800 MG tablet Take 1 tablet (800 mg) by mouth daily as needed for moderate pain 10/16/19  Yes Chemo Ramos MD   losartan (COZAAR) 25 MG tablet Take 12.5 mg by mouth daily   Yes Reported, Patient   rosuvastatin (CRESTOR) 20 MG tablet Take 0.5 tablets (10 mg) by mouth daily 2/17/20  Yes Chemo Ramos MD   acetaZOLAMIDE (DIAMOX) 250 MG tablet TAKE 1 TABLET(250 MG) BY MOUTH TWICE DAILY. START 2 DAYS BEFORE ALTITUDE 8/6/20   Kayden Bowden MD   nabumetone (RELAFEN) 750 MG tablet Take 1 tablet (750 mg) by mouth 2 times daily as needed for moderate pain 10/16/19   Chemo Ramos MD       Physical Exam:  BP (!) 143/77   Pulse 63   Temp 97.1  F (36.2  C) (Tympanic)   Ht 1.651 m (5' 5\")   SpO2 96%   BMI 31.45 kg/m  "   EXAM:  Chest/Respiratory Exam: Normal - Clear to auscultation without rales, rhonchi, or wheezing.  Cardiovascular Exam: normal, regular rate and rhythm        PLAN: COLONOSCOPY .  Patient understands risks of bleeding, perforation, potential inability to reach cecum, aspiration and wishes to proceed. MAC needed for age.

## 2021-03-24 PROBLEM — K63.5 COLON POLYPS: Status: RESOLVED | Noted: 2021-03-22 | Resolved: 2021-03-24

## 2021-04-25 ENCOUNTER — HEALTH MAINTENANCE LETTER (OUTPATIENT)
Age: 71
End: 2021-04-25

## 2021-05-15 ENCOUNTER — OFFICE VISIT (OUTPATIENT)
Dept: FAMILY MEDICINE | Facility: OTHER | Age: 71
End: 2021-05-15
Attending: NURSE PRACTITIONER
Payer: MEDICARE

## 2021-05-15 VITALS
DIASTOLIC BLOOD PRESSURE: 82 MMHG | TEMPERATURE: 97.7 F | OXYGEN SATURATION: 96 % | SYSTOLIC BLOOD PRESSURE: 140 MMHG | WEIGHT: 193.2 LBS | RESPIRATION RATE: 14 BRPM | HEIGHT: 67 IN | HEART RATE: 64 BPM | BODY MASS INDEX: 30.32 KG/M2
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
## 2021-05-15 PROCEDURE — G0463 HOSPITAL OUTPT CLINIC VISIT: HCPCS

## 2021-05-15 PROCEDURE — 99213 OFFICE O/P EST LOW 20 MIN: CPT | Performed by: NURSE PRACTITIONER

## 2021-05-15 RX ORDER — DOXYCYCLINE 100 MG/1
200 CAPSULE ORAL ONCE
Qty: 2 CAPSULE | Refills: 0 | Status: SHIPPED | OUTPATIENT
Start: 2021-05-15 | End: 2021-05-15

## 2021-05-15 ASSESSMENT — MIFFLIN-ST. JEOR: SCORE: 1589.98

## 2021-05-15 ASSESSMENT — PAIN SCALES - GENERAL: PAINLEVEL: NO PAIN (0)

## 2021-05-15 NOTE — PATIENT INSTRUCTIONS
Doxycycline 200 mg one time for prophylactic dosing    No lab work needed today as it is too soon for accurate results     Instructed to wash tick bite with soap and water. Apply antibiotic ointment to site 1-2 times daily until healed.    Watch for flu like illness such as fevers and chills; arthritis type pain such as joint pains and stiffness.     Follow up if development of any of these symptoms for further evaluation.       Patient Education     Tick Bite, Antibiotic Treatment     Ticks are small arachnids that feed on the blood of rodents, rabbits, birds, deer, dogs and humans. The bite may cause a small reaction like that of a spider, with a small amount of limited redness, itching and slight swelling. Sometimes there is no local reaction.  Most tick bites are harmless. But some ticks carry diseases, such as Lyme disease or [place] spotted fever. These can be passed to people at the time of the bite. Lyme disease is of greatest concern. Right now you have no symptoms of Lyme disease or other serious reaction to the bite. It's important to watch for the warning signs, which could appear weeks to months after the tick bite.  Home care  These guidelines can help you care for your bite at home:    If itching is a problem, don't wear tight clothing or anything that heats up your skin. This includes hot showers or baths and direct sunlight. This often makes the itching worse.    An ice pack will reduce local areas of redness and itching. Make your own ice pack by putting ice cubes in a zip-top plastic bag and wrapping it in a thin towel. Over-the-counter creams containing benzocaine may help with itching.    You can use an antihistamine with diphenhydramine if your healthcare provider didn't give you another antihistamine. This medicine may be used to reduce itching if large areas of the skin are involved. It's available at drugstores and grocery stores. Use caution because this medicine may cause  drowsiness. If symptoms continue, talk with your healthcare provider or pharmacist about other over-the counter or prescription medicines that may be helpful.    Your healthcare provider may prescribe antibiotics to reduce your risk of getting Lyme disease. It's very important that you take them exactly as directed until they are completely finished.  Follow-up care  Follow up with your healthcare provider, or as advised.  Call 911  Call 911 if any of these occur:    Irregular or rapid heartbeat    Numbness, tingling, or weakness in the arms or legs  When to seek medical advice  Call your healthcare provider right away if any of these occur:  Signs of local infection. Watch for these during the next few days:    Increasing redness around the bite site    Increased pain or swelling    Fever over 100.4 F (38 C), or as directed by your healthcare provider    Fluid draining from the bite area  Signs of tick-related disease. Watch for these over the next few weeks to months:    Circular, red, ring-like rash appears at the bite area within 1 to 3 weeks    Tiredness, fever or chills, nausea or vomiting    Neck pain or stiffness, headache, or confusion    Muscle or bone aches    Joint pain or swelling, especially in the knee    Weakness on one side of the face    A spotted rash that starts on or includes the palms of the hands and soles of the feet  [employer] last reviewed this educational content on 12/1/2019 2000-2021 [employer]. All rights reserved. This information is not intended as a substitute for professional medical care. Always follow your healthcare professional's instructions.

## 2021-05-15 NOTE — NURSING NOTE
Patient presents to the clinic for tick bite on left side of torso that patient found yesterday. He states it was a deer tick and the area is red, itchy and tender.   Medication Reconciliation: complete    Chelsea Garcias, CMA

## 2021-05-15 NOTE — PROGRESS NOTES
ASSESSMENT/PLAN:  1. Tick bite, initial encounter    - doxycycline hyclate (VIBRAMYCIN) 100 MG capsule; Take 2 capsules (200 mg) by mouth once for 1 dose  Dispense: 2 capsule; Refill: 0    Discussed with the patient that because he did identify this as a deer tick and was unsure when the tick initially attached he will be prescribed doxycycline 200 mg one time dose.     No lab work needed today as it is too soon for accurate results     Instructed to wash tick bite with soap and water. Apply antibiotic ointment to site 1-2 times daily until healed.    Watch for flu like illness such as fevers and chills; arthritis type pain such as joint pains and stiffness.     May use over-the-counter Tylenol or ibuprofen PRN    Discussed warning signs/symptoms indicative of need to f/u    Follow up if symptoms persist or worsen or concerns      I explained my diagnostic considerations and recommendations to the patient, who voiced understanding and agreement with the treatment plan. All questions were answered. We discussed potential side effects of any prescribed or recommended therapies, as well as expectations for response to treatments.    Disclaimer:  This note consists of words and symbols derived from keyboarding, dictation, or using voice recognition software. As a result, there may be errors in the script that have gone undetected. Please consider this when interpreting information found in this note.    HPI:    Ammon Harding is a 70 year old male  who presents to [facility] today for a tick bite to the left side of his chest. He reports removing the tick approximately 24 hours ago. Outside at a camp on Thursday 5/13/21, but unsure of exactly when the tick attached. He believes this was a deer tick. He washed the bite. Denies applying any antibiotic ointment. Reports that the tick bite is itchy and tender.      Past Medical History:   Diagnosis Date     Elevated prostate specific antigen (PSA)     2106,psa 5.1      "Hyperlipidemia      Hypertension      Osteoarthritis      Past Surgical History:   Procedure Laterality Date     ARTHROPLASTY KNEE Right 2017     ARTHROSCOPY KNEE Left 2012     [place]     COLONOSCOPY N/A 03/22/2021    F/U 2026 tubular adenomas     PROSTATE BIOPSY, NEEDLE, SATURATION SAMPLING  2017     PROSTATE BIOPSY, NEEDLE, SATURATION SAMPLING  05/2020    Benign     TONSILLECTOMY      No Comments Provided     VASECTOMY      No Comments Provided     Social History     Tobacco Use     Smoking status: Current Every Day Smoker     Types: Pipe     Smokeless tobacco: Never Used     Tobacco comment: Quit smoking: refused   Substance Use Topics     Alcohol use: Yes     Frequency: 4 or more times a week     Drinks per session: 1 or 2     Comment: 14 a week      Current Outpatient Medications   Medication Sig Dispense Refill     acetaZOLAMIDE (DIAMOX) 250 MG tablet TAKE 1 TABLET(250 MG) BY MOUTH TWICE DAILY. START 2 DAYS BEFORE ALTITUDE 50 tablet 0     finasteride (PROSCAR) 5 MG tablet Take 5 mg by mouth daily        ibuprofen (ADVIL/MOTRIN) 800 MG tablet Take 1 tablet (800 mg) by mouth daily as needed for moderate pain       losartan (COZAAR) 25 MG tablet Take 12.5 mg by mouth daily       nabumetone (RELAFEN) 750 MG tablet Take 1 tablet (750 mg) by mouth 2 times daily as needed for moderate pain 60 tablet 3     rosuvastatin (CRESTOR) 20 MG tablet Take 0.5 tablets (10 mg) by mouth daily       No Known Allergies      Past medical history, past surgical history, current medications and allergies reviewed and accurate to the best of my knowledge.        ROS:  Refer to HPI    BP (!) 140/82 (BP Location: Left arm, Patient Position: Sitting, Cuff Size: Adult Regular)   Pulse 64   Temp 97.7  F (36.5  C) (Tympanic)   Resp 14   Ht 1.702 m (5' 7\")   Wt 87.6 kg (193 lb 3.2 oz)   SpO2 96%   BMI 30.26 kg/m      EXAM:  General Appearance: Well appearing male, appropriate appearance for age. No acute " distress  Dermatological: Area of erythema and swelling where the tick was removed.   Psychological: normal affect, alert, oriented, and pleasant.

## 2021-10-09 ENCOUNTER — HEALTH MAINTENANCE LETTER (OUTPATIENT)
Age: 71
End: 2021-10-09

## 2021-10-27 ENCOUNTER — IMMUNIZATION (OUTPATIENT)
Dept: FAMILY MEDICINE | Facility: OTHER | Age: 71
End: 2021-10-27
Attending: INTERNAL MEDICINE
Payer: MEDICARE

## 2021-10-27 PROCEDURE — 91300 PR COVID VAC PFIZER DIL RECON 30 MCG/0.3 ML IM: CPT

## 2022-05-11 ENCOUNTER — ALLIED HEALTH/NURSE VISIT (OUTPATIENT)
Dept: FAMILY MEDICINE | Facility: OTHER | Age: 72
End: 2022-05-11
Attending: FAMILY MEDICINE
Payer: MEDICARE

## 2022-05-11 DIAGNOSIS — Z23 HIGH PRIORITY FOR 2019-NCOV VACCINE: Primary | ICD-10-CM

## 2022-05-11 PROCEDURE — 0054A COVID-19,PF,PFIZER (12+ YRS): CPT

## 2022-05-25 ENCOUNTER — MYC MEDICAL ADVICE (OUTPATIENT)
Dept: INTERNAL MEDICINE | Facility: OTHER | Age: 72
End: 2022-05-25
Payer: MEDICARE

## 2022-05-25 DIAGNOSIS — M54.41 CHRONIC BILATERAL LOW BACK PAIN WITH BILATERAL SCIATICA: Primary | ICD-10-CM

## 2022-05-25 DIAGNOSIS — M54.42 CHRONIC BILATERAL LOW BACK PAIN WITH BILATERAL SCIATICA: Primary | ICD-10-CM

## 2022-05-25 DIAGNOSIS — G89.29 CHRONIC BILATERAL LOW BACK PAIN WITH BILATERAL SCIATICA: Primary | ICD-10-CM

## 2022-05-25 NOTE — TELEPHONE ENCOUNTER
Since it has been greater than 2 years since his last MRI, he will need an updated MRI before having a back injection.  I can order this if he wants to proceed.

## 2022-06-16 ENCOUNTER — HOSPITAL ENCOUNTER (OUTPATIENT)
Dept: MRI IMAGING | Facility: OTHER | Age: 72
Discharge: HOME | End: 2022-06-16
Attending: INTERNAL MEDICINE | Admitting: INTERNAL MEDICINE
Payer: MEDICARE

## 2022-06-16 DIAGNOSIS — M54.42 CHRONIC BILATERAL LOW BACK PAIN WITH BILATERAL SCIATICA: ICD-10-CM

## 2022-06-16 DIAGNOSIS — M54.41 CHRONIC BILATERAL LOW BACK PAIN WITH BILATERAL SCIATICA: ICD-10-CM

## 2022-06-16 DIAGNOSIS — G89.29 CHRONIC BILATERAL LOW BACK PAIN WITH BILATERAL SCIATICA: ICD-10-CM

## 2022-06-16 PROCEDURE — 72148 MRI LUMBAR SPINE W/O DYE: CPT

## 2022-06-20 ENCOUNTER — TELEPHONE (OUTPATIENT)
Dept: INTERNAL MEDICINE | Facility: OTHER | Age: 72
End: 2022-06-20
Payer: MEDICARE

## 2022-06-20 DIAGNOSIS — G89.29 CHRONIC BILATERAL LOW BACK PAIN WITH BILATERAL SCIATICA: Primary | ICD-10-CM

## 2022-06-20 DIAGNOSIS — M54.41 CHRONIC BILATERAL LOW BACK PAIN WITH BILATERAL SCIATICA: Primary | ICD-10-CM

## 2022-06-20 DIAGNOSIS — M54.42 CHRONIC BILATERAL LOW BACK PAIN WITH BILATERAL SCIATICA: Primary | ICD-10-CM

## 2022-06-20 DIAGNOSIS — M48.062 SPINAL STENOSIS, LUMBAR REGION WITH NEUROGENIC CLAUDICATION: ICD-10-CM

## 2022-07-19 ENCOUNTER — HOSPITAL ENCOUNTER (OUTPATIENT)
Dept: GENERAL RADIOLOGY | Facility: OTHER | Age: 72
Discharge: HOME | End: 2022-07-19
Attending: INTERNAL MEDICINE | Admitting: INTERNAL MEDICINE
Payer: MEDICARE

## 2022-07-19 DIAGNOSIS — M48.062 SPINAL STENOSIS, LUMBAR REGION WITH NEUROGENIC CLAUDICATION: ICD-10-CM

## 2022-07-19 DIAGNOSIS — G89.29 CHRONIC BILATERAL LOW BACK PAIN WITH BILATERAL SCIATICA: ICD-10-CM

## 2022-07-19 DIAGNOSIS — M54.41 CHRONIC BILATERAL LOW BACK PAIN WITH BILATERAL SCIATICA: ICD-10-CM

## 2022-07-19 DIAGNOSIS — M54.42 CHRONIC BILATERAL LOW BACK PAIN WITH BILATERAL SCIATICA: ICD-10-CM

## 2022-07-19 PROCEDURE — 62323 NJX INTERLAMINAR LMBR/SAC: CPT

## 2022-07-19 PROCEDURE — 255N000002 HC RX 255 OP 636: Performed by: RADIOLOGY

## 2022-07-19 PROCEDURE — 250N000011 HC RX IP 250 OP 636: Performed by: RADIOLOGY

## 2022-07-19 PROCEDURE — 250N000009 HC RX 250: Performed by: RADIOLOGY

## 2022-07-19 RX ORDER — DEXAMETHASONE SODIUM PHOSPHATE 10 MG/ML
10 INJECTION, SOLUTION INTRAMUSCULAR; INTRAVENOUS ONCE
Status: COMPLETED | OUTPATIENT
Start: 2022-07-19 | End: 2022-07-19

## 2022-07-19 RX ORDER — LIDOCAINE HYDROCHLORIDE 10 MG/ML
1 INJECTION, SOLUTION INFILTRATION; PERINEURAL ONCE
Status: COMPLETED | OUTPATIENT
Start: 2022-07-19 | End: 2022-07-19

## 2022-07-19 RX ORDER — LIDOCAINE HYDROCHLORIDE 10 MG/ML
2 INJECTION, SOLUTION EPIDURAL; INFILTRATION; INTRACAUDAL; PERINEURAL ONCE
Status: COMPLETED | OUTPATIENT
Start: 2022-07-19 | End: 2022-07-19

## 2022-07-19 RX ADMIN — LIDOCAINE HYDROCHLORIDE 1 ML: 10 INJECTION, SOLUTION INFILTRATION; PERINEURAL at 12:02

## 2022-07-19 RX ADMIN — DEXAMETHASONE SODIUM PHOSPHATE 10 MG: 10 INJECTION, SOLUTION INTRAMUSCULAR; INTRAVENOUS at 12:03

## 2022-07-19 RX ADMIN — LIDOCAINE HYDROCHLORIDE 2 ML: 10 INJECTION, SOLUTION EPIDURAL; INFILTRATION; INTRACAUDAL; PERINEURAL at 12:03

## 2022-07-19 RX ADMIN — IOHEXOL 1 ML: 240 INJECTION, SOLUTION INTRATHECAL; INTRAVASCULAR; INTRAVENOUS; ORAL at 12:02

## 2022-11-10 ENCOUNTER — HOSPITAL ENCOUNTER (EMERGENCY)
Facility: OTHER | Age: 72
Discharge: OTHER INSTITUTION | End: 2022-11-10
Attending: EMERGENCY MEDICINE | Admitting: EMERGENCY MEDICINE
Payer: MEDICARE

## 2022-11-10 ENCOUNTER — APPOINTMENT (OUTPATIENT)
Dept: GENERAL RADIOLOGY | Facility: OTHER | Age: 72
End: 2022-11-10
Attending: EMERGENCY MEDICINE
Payer: MEDICARE

## 2022-11-10 ENCOUNTER — APPOINTMENT (OUTPATIENT)
Dept: CT IMAGING | Facility: OTHER | Age: 72
End: 2022-11-10
Attending: EMERGENCY MEDICINE
Payer: MEDICARE

## 2022-11-10 VITALS
RESPIRATION RATE: 9 BRPM | TEMPERATURE: 97.4 F | HEIGHT: 67 IN | DIASTOLIC BLOOD PRESSURE: 97 MMHG | BODY MASS INDEX: 30.4 KG/M2 | SYSTOLIC BLOOD PRESSURE: 160 MMHG | OXYGEN SATURATION: 100 % | HEART RATE: 101 BPM | WEIGHT: 193.7 LBS

## 2022-11-10 DIAGNOSIS — I63.22 CEREBROVASCULAR ACCIDENT (CVA) DUE TO OCCLUSION OF BASILAR ARTERY (H): ICD-10-CM

## 2022-11-10 LAB
ALBUMIN UR-MCNC: 10 MG/DL
ANION GAP SERPL CALCULATED.3IONS-SCNC: 11 MMOL/L (ref 7–15)
APPEARANCE UR: CLEAR
APTT PPP: 20 SECONDS (ref 22–38)
BASOPHILS # BLD AUTO: 0 10E3/UL (ref 0–0.2)
BASOPHILS NFR BLD AUTO: 0 %
BILIRUB UR QL STRIP: NEGATIVE
BUN SERPL-MCNC: 17.7 MG/DL (ref 8–23)
CALCIUM SERPL-MCNC: 8.8 MG/DL (ref 8.8–10.2)
CHLORIDE SERPL-SCNC: 104 MMOL/L (ref 98–107)
COLOR UR AUTO: ABNORMAL
CREAT SERPL-MCNC: 1.09 MG/DL (ref 0.67–1.17)
DEPRECATED HCO3 PLAS-SCNC: 22 MMOL/L (ref 22–29)
EOSINOPHIL # BLD AUTO: 0 10E3/UL (ref 0–0.7)
EOSINOPHIL NFR BLD AUTO: 0 %
ERYTHROCYTE [DISTWIDTH] IN BLOOD BY AUTOMATED COUNT: 12.2 % (ref 10–15)
FLUAV RNA SPEC QL NAA+PROBE: NEGATIVE
FLUBV RNA RESP QL NAA+PROBE: NEGATIVE
GFR SERPL CREATININE-BSD FRML MDRD: 72 ML/MIN/1.73M2
GLUCOSE SERPL-MCNC: 137 MG/DL (ref 70–99)
GLUCOSE UR STRIP-MCNC: NEGATIVE MG/DL
HCT VFR BLD AUTO: 45.1 % (ref 40–53)
HGB BLD-MCNC: 15.6 G/DL (ref 13.3–17.7)
HGB UR QL STRIP: ABNORMAL
HOLD SPECIMEN: NORMAL
HOLD SPECIMEN: NORMAL
IMM GRANULOCYTES # BLD: 0 10E3/UL
IMM GRANULOCYTES NFR BLD: 0 %
INR PPP: 1.05 (ref 0.85–1.15)
KETONES UR STRIP-MCNC: ABNORMAL MG/DL
LEUKOCYTE ESTERASE UR QL STRIP: NEGATIVE
LYMPHOCYTES # BLD AUTO: 0.6 10E3/UL (ref 0.8–5.3)
LYMPHOCYTES NFR BLD AUTO: 6 %
MCH RBC QN AUTO: 32.5 PG (ref 26.5–33)
MCHC RBC AUTO-ENTMCNC: 34.6 G/DL (ref 31.5–36.5)
MCV RBC AUTO: 94 FL (ref 78–100)
MONOCYTES # BLD AUTO: 0.3 10E3/UL (ref 0–1.3)
MONOCYTES NFR BLD AUTO: 4 %
MUCOUS THREADS #/AREA URNS LPF: PRESENT /LPF
NEUTROPHILS # BLD AUTO: 8.1 10E3/UL (ref 1.6–8.3)
NEUTROPHILS NFR BLD AUTO: 90 %
NITRATE UR QL: NEGATIVE
NRBC # BLD AUTO: 0 10E3/UL
NRBC BLD AUTO-RTO: 0 /100
PH UR STRIP: 6.5 [PH] (ref 5–9)
PLATELET # BLD AUTO: 190 10E3/UL (ref 150–450)
POTASSIUM SERPL-SCNC: 4.2 MMOL/L (ref 3.4–5.3)
RBC # BLD AUTO: 4.8 10E6/UL (ref 4.4–5.9)
RBC URINE: >182 /HPF
RSV RNA SPEC NAA+PROBE: NEGATIVE
SARS-COV-2 RNA RESP QL NAA+PROBE: NEGATIVE
SODIUM SERPL-SCNC: 137 MMOL/L (ref 136–145)
SP GR UR STRIP: 1.03 (ref 1–1.03)
TROPONIN T SERPL HS-MCNC: 11 NG/L
UROBILINOGEN UR STRIP-MCNC: NORMAL MG/DL
WBC # BLD AUTO: 9 10E3/UL (ref 4–11)
WBC URINE: 2 /HPF

## 2022-11-10 PROCEDURE — C9803 HOPD COVID-19 SPEC COLLECT: HCPCS | Performed by: EMERGENCY MEDICINE

## 2022-11-10 PROCEDURE — 84484 ASSAY OF TROPONIN QUANT: CPT | Performed by: EMERGENCY MEDICINE

## 2022-11-10 PROCEDURE — 81003 URINALYSIS AUTO W/O SCOPE: CPT | Performed by: EMERGENCY MEDICINE

## 2022-11-10 PROCEDURE — 250N000009 HC RX 250: Performed by: EMERGENCY MEDICINE

## 2022-11-10 PROCEDURE — 85004 AUTOMATED DIFF WBC COUNT: CPT | Performed by: EMERGENCY MEDICINE

## 2022-11-10 PROCEDURE — 250N000011 HC RX IP 250 OP 636: Performed by: EMERGENCY MEDICINE

## 2022-11-10 PROCEDURE — 99291 CRITICAL CARE FIRST HOUR: CPT | Mod: 25 | Performed by: EMERGENCY MEDICINE

## 2022-11-10 PROCEDURE — 999N000065 XR CHEST PORT 1 VIEW

## 2022-11-10 PROCEDURE — 70498 CT ANGIOGRAPHY NECK: CPT | Mod: MG

## 2022-11-10 PROCEDURE — 87637 SARSCOV2&INF A&B&RSV AMP PRB: CPT | Performed by: EMERGENCY MEDICINE

## 2022-11-10 PROCEDURE — 85610 PROTHROMBIN TIME: CPT | Performed by: EMERGENCY MEDICINE

## 2022-11-10 PROCEDURE — 93010 ELECTROCARDIOGRAM REPORT: CPT | Performed by: INTERNAL MEDICINE

## 2022-11-10 PROCEDURE — 96374 THER/PROPH/DIAG INJ IV PUSH: CPT | Performed by: EMERGENCY MEDICINE

## 2022-11-10 PROCEDURE — 85730 THROMBOPLASTIN TIME PARTIAL: CPT | Performed by: EMERGENCY MEDICINE

## 2022-11-10 PROCEDURE — G1010 CDSM STANSON: HCPCS

## 2022-11-10 PROCEDURE — 99292 CRITICAL CARE ADDL 30 MIN: CPT | Mod: 25 | Performed by: EMERGENCY MEDICINE

## 2022-11-10 PROCEDURE — 99292 CRITICAL CARE ADDL 30 MIN: CPT | Performed by: EMERGENCY MEDICINE

## 2022-11-10 PROCEDURE — 80048 BASIC METABOLIC PNL TOTAL CA: CPT | Performed by: EMERGENCY MEDICINE

## 2022-11-10 PROCEDURE — 36415 COLL VENOUS BLD VENIPUNCTURE: CPT | Performed by: EMERGENCY MEDICINE

## 2022-11-10 PROCEDURE — 96375 TX/PRO/DX INJ NEW DRUG ADDON: CPT | Performed by: EMERGENCY MEDICINE

## 2022-11-10 PROCEDURE — 93005 ELECTROCARDIOGRAM TRACING: CPT | Performed by: EMERGENCY MEDICINE

## 2022-11-10 PROCEDURE — 31500 INSERT EMERGENCY AIRWAY: CPT | Performed by: EMERGENCY MEDICINE

## 2022-11-10 RX ORDER — FENTANYL CITRATE 50 UG/ML
100 INJECTION, SOLUTION INTRAMUSCULAR; INTRAVENOUS ONCE
Status: COMPLETED | OUTPATIENT
Start: 2022-11-10 | End: 2022-11-10

## 2022-11-10 RX ORDER — ONDANSETRON 2 MG/ML
4 INJECTION INTRAMUSCULAR; INTRAVENOUS ONCE
Status: COMPLETED | OUTPATIENT
Start: 2022-11-10 | End: 2022-11-10

## 2022-11-10 RX ORDER — MIDAZOLAM HYDROCHLORIDE 5 MG/ML
10 INJECTION, SOLUTION INTRAMUSCULAR; INTRAVENOUS ONCE
Status: COMPLETED | OUTPATIENT
Start: 2022-11-10 | End: 2022-11-10

## 2022-11-10 RX ORDER — IOPAMIDOL 755 MG/ML
75 INJECTION, SOLUTION INTRAVASCULAR ONCE
Status: COMPLETED | OUTPATIENT
Start: 2022-11-10 | End: 2022-11-10

## 2022-11-10 RX ADMIN — MIDAZOLAM HYDROCHLORIDE 10 MG: 5 INJECTION, SOLUTION INTRAMUSCULAR; INTRAVENOUS at 16:13

## 2022-11-10 RX ADMIN — ROCURONIUM BROMIDE 120 MG: 10 SOLUTION INTRAVENOUS at 16:16

## 2022-11-10 RX ADMIN — FENTANYL CITRATE 100 MCG: 50 INJECTION, SOLUTION INTRAMUSCULAR; INTRAVENOUS at 16:16

## 2022-11-10 RX ADMIN — IOPAMIDOL 165 ML: 755 INJECTION, SOLUTION INTRAVENOUS at 15:05

## 2022-11-10 RX ADMIN — ONDANSETRON 4 MG: 2 INJECTION INTRAMUSCULAR; INTRAVENOUS at 14:45

## 2022-11-10 ASSESSMENT — ACTIVITIES OF DAILY LIVING (ADL): ADLS_ACUITY_SCORE: 35

## 2022-11-10 NOTE — CONSULTS
[facility]    Stroke Consult Note    Reason for Consult: Stroke Code     Chief Complaint: Stroke Symptoms      HPI  Ammon Harding is a 71 year old male with PMH HTN, HLD. Patient unable to provide history currently so his wife and cousin present in the room provided history. His wife reports that Friday of last week () the patient started having significant dizziness that he described as vertigo. She did not see him earlier today. He went to a  today, was using a walking stick due to the dizziness but was able to ambulate in on his own and signed in, the person he spoke to at the sign in said his speech seemed to be OK at that time (~12:15). His cousin arrived at 12:45, sat down by the patient and at that time noted his speech to be slurred. The patient was able to tell him that he's been feeling dizzy and that was why he was using the case. From there his speech became progressively more slurred and he developed onset of L sided weakness. When paramedics arrived the patient was able to give his  but subsequently speech became so slurred that he could not be understood and currently he is non-verbal.  Initial BP in the /101>175/88.    Current NIHSS 21 with dense L sided weakness, mute, follows some commands.    Imaging Findings  Head CT: no hemorrhage, R cerebellar subacute infarct    CTA head/neck: R vert intermittent occlusion within the neck, proximal basilar narrowing, mid-top of basilar occlusion/severe stenosis      Intravenous Thrombolysis  Not given due to:   - unclear or unfavorable risk-benefit profile for extended window thrombolysis beyond the conventional 4.5 hour time window    Endovascular Treatment  Endovascular treatment initiated for R vert and basilar artery occlusion    Impression   1. Acute ischemic stroke of R cerebellum and brainstem due to undetermined etiology   2. 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 It sounds like air travel is not an option currently. Recommend transfer to [place] given geographic proximity.  -Permissive HTN      Thank you for this consult.      Pari Allen PA-C  Vascular Neurology    To page me or covering stroke neurology team member, click here: [facility]   Choose \"On Call\" tab at top, then search dropdown box for \"Neurology Adult\", select location, press Enter, then look for stroke/neuro ICU/telestroke.  ______________________________________________________    Clinically Significant Risk Factors Present on Admission                        Past Medical History   Past Medical History:   Diagnosis Date     Elevated prostate specific antigen (PSA)     2106,psa 5.1     Hyperlipidemia      Hypertension      Osteoarthritis      Past Surgical History   Past Surgical History:   Procedure Laterality Date     ARTHROPLASTY KNEE Right 2017     ARTHROSCOPY KNEE Left 2012     COLONOSCOPY  01/01/2016 [place]     COLONOSCOPY N/A 03/22/2021    F/U 2026 tubular adenomas     PROSTATE BIOPSY, NEEDLE, SATURATION SAMPLING  2017     PROSTATE BIOPSY, NEEDLE, SATURATION SAMPLING  05/2020    Benign     TONSILLECTOMY      No Comments Provided     VASECTOMY      No Comments Provided     Medications   Home Meds  Prior to Admission medications    Medication Sig Start Date End Date Taking? Authorizing Provider   acetaZOLAMIDE (DIAMOX) 250 MG tablet TAKE 1 TABLET(250 MG) BY MOUTH TWICE DAILY. START 2 DAYS BEFORE ALTITUDE 8/6/20   Kayden Bowden MD   finasteride (PROSCAR) 5 MG tablet Take 5 mg by mouth daily  6/24/19   Reported, Patient   ibuprofen (ADVIL/MOTRIN) 800 MG tablet Take 1 tablet (800 mg) by mouth daily as needed for moderate pain 10/16/19   Chemo Ramos MD   losartan (COZAAR) 25 MG tablet Take 12.5 mg by mouth daily    Reported, Patient   nabumetone (RELAFEN) 750 MG tablet Take 1 tablet (750 mg) by mouth 2 times daily as needed for " moderate pain 10/16/19   Chemo Ramos MD   rosuvastatin (CRESTOR) 20 MG tablet Take 0.5 tablets (10 mg) by mouth daily 2/17/20   Chemo Ramos MD       Scheduled Meds      Infusion Meds      PRN Meds      Allergies   No Known Allergies  Family History   Family History   Problem Relation Age of Onset     Heart Failure Father      Dementia Father      Other - See Comments Mother         Dementia     Diabetes Brother      Heart Disease Other         Heart Disease,In distant relatives     Social History   Social History     Tobacco Use     Smoking status: Every Day     Types: Pipe     Smokeless tobacco: Never     Tobacco comments:     Quit smoking: refused   Substance Use Topics     Alcohol use: Yes     Comment: 14 a week      Drug use: No     Comment: Drug use: No       Review of Systems   Review of systems not obtained due to patient factors - mental status       PHYSICAL EXAMINATION  Temp:  [97.4  F (36.3  C)] 97.4  F (36.3  C)  Pulse:  [80-87] 85  Resp:  [13-23] 13  BP: (121-175)/() 134/82  SpO2:  [91 %-95 %] 91 %     General Exam  General:  lying in bed, uncomfortable, intermittent retching    HEENT:  normocephalic/atraumatic  Pulmonary:  no respiratory distress    Neuro Exam  Mental Status:  drowsy but opens eyes to voice, non-verbal, follows a few commands but for most does not follow or requires visual cuing  Cranial Nerves:  L facial weakness, non-verbal, difficult to assess visual fields, no gaze preference and able to track to both sides  Motor:  holds RUE against gravity, LUE no movement and increased tone, RLE some movement but did not hold against gravity, LLE some movement but less than the R and did not move against gravity   Reflexes:  unable to test (telestroke)  Sensory:  grimaced to noxious stimulus on the L  Coordination:  limited assessment, no obvious incoordination out of proportion to weakness  Station/Gait:  unable to test due to telestroke    Dysphagia Screen  Keep NPO    Stroke  Scales    NIHSS  1a. Level of Consciousness 1-->Not alert, but arousable by minor stimulation to obey, answer, or respond   1b. LOC Questions 2-->Answers neither question correctly   1c. LOC Commands 2-->Performs neither task correctly   2.   Best Gaze 0-->Normal   3.   Visual 0-->No visual loss   4.   Facial Palsy 2-->Partial paralysis (total or near-total paralysis of lower face)   5a. Motor Arm, Left 4-->No movement   5b. Motor Arm, Right 0-->No drift, limb holds 90 (or 45) degrees for full 10 secs   6a. Motor Leg, Left 3-->No effort against gravity, leg falls to bed immediately   6b. Motor Leg, right 2-->Some effort against gravity, leg falls to bed by 5 secs, but has some effort against gravity   7.   Limb Ataxia 0-->Absent   8.   Sensory 0-->Normal, no sensory loss   9.   Best Language 3-->Mute, global aphasia, no usable speech or auditory comprehension   10. Dysarthria 2-->Severe dysarthria, patients speech is so slurred as to be unintelligible in the absence of or out of proportion to any dysphasia, or is mute/anarthric   11. Extinction and Inattention  0-->No abnormality   Total 21 (11/10/22 1545)       Modified [place] Score (Pre-morbid)    -      Imaging  I personally reviewed all imaging; relevant findings per HPI.     Lab Results Data   CBC  Recent Labs   Lab 11/10/22  1522   WBC 9.0   RBC 4.80   HGB 15.6   HCT 45.1        Basic Metabolic Panel    Recent Labs   Lab 11/10/22  1522      POTASSIUM 4.2   CHLORIDE 104   CO2 22   BUN 17.7   CR 1.09   *   MANISH 8.8     Liver Panel  No results for input(s): PROTTOTAL, ALBUMIN, BILITOTAL, ALKPHOS, AST, ALT, BILIDIRECT in the last 168 hours.  INR    Recent Labs   Lab Test 11/10/22  1522 01/23/17  1044   INR 1.05 1.0      Lipid Profile    Recent Labs   Lab Test 07/10/20  1558 10/16/19  0849   CHOL 149 227*   HDL 64 65   LDL 72 147*   TRIG 66 75     A1C  No lab results found.  Troponin    Recent Labs   Lab 11/10/22  1522   CTROPT 11          Stroke  Code Data Data   Stroke Code Data  (for stroke code with tele)  Stroke code activated 11/10/22   1440   First stroke provider response 11/10/22   1441   Video start time 11/10/22   1459   Video end time 11/10/22   1523   Last known normal 11/04/22       Time of discovery  (or onset of symptoms)  11/04/22    (onset of dizziness/vertigo 11/4, acute onset L sided weakness today 11/10)   Head CT read by Stroke Neuro Dr/Provider 11/10/22   9348   Was stroke code de-escalated? No               Telestroke Service Details  Type of service telemedicine diagnostic assessment of acute neurological changes   Reason telemedicine is appropriate patient requires assessment with a specialist for diagnosis and treatment of neurological symptoms   Mode of transmission secure interactive audio and video communication per Tad   Originating site (patient location) [facility]    Distant site (provider location) [facility]       I personally examined and evaluated the patient today. At the time of my evaluation and management the patient was in critical condition today due to stroke code. I personally managed history, exam, imaging review, discussion with care team, family. Key decisions made today included no TNK, transfer for thrombectomy. I spent a total of 75 minutes providing critical care services, evaluating the patient, directing care and reviewing laboratory values and radiologic reports.

## 2022-11-10 NOTE — ED PROVIDER NOTES
History     Chief Complaint   Patient presents with     Stroke Symptoms     HPI  Ammon Harding is a 71 year old male who came in by ambulance hoop reactivated a stroke code.  He was at a  when his cousin arrived and noticed that he had some slurred speech.  Apparently he had arrived approximately 20 minutes prior to that and signed his name and and the woman who he spoke to this that he seemed to completely normal at that time.  His cousin said he talked with him first at 12:45 PM.  At that time they said that he was able to answer and give his date of birth, however by the time they arrived here he was unable to speak at all.  He would not even make an effort to answer me verbally.  He also complained 6 days ago to his wife of feeling dizzy and his legs were not moving quite right.  He was using a cane, however that seems to have improved.  He told his cousin that he had been feeling dizzy for a few days.  A tier 1 stroke code was called prior to his arrival.    Allergies:  No Known Allergies    Problem List:    Patient Active Problem List    Diagnosis Date Noted     H/O adenomatous polyp of colon 2021     Priority: Medium     Benign prostatic hyperplasia without urinary obstruction 2020     Priority: Medium     Prostate nodule 2020     Priority: Medium     Chronic kidney disease, stage 2 (mild) 10/16/2019     Priority: Medium     Other hyperlipidemia 10/16/2019     Priority: Medium     Osteoarthritis 2019     Priority: Medium     Deviated nasal septum 2019     Priority: Medium     Benign essential hypertension 2018     Priority: Medium     Elevated PSA 2018     Priority: Medium     Overview:   psa 5.1       Digital mucous cyst of toe of right foot 2018     Priority: Medium     Overview:   left foot       History of prostate biopsy 10/18/2017     Priority: Medium     Tinnitus 2015     Priority: Medium     Low back pain with sciatica 2014      "Priority: Medium        Past Medical History:    Past Medical History:   Diagnosis Date     Elevated prostate specific antigen (PSA)      Hyperlipidemia      Hypertension      Osteoarthritis        Past Surgical History:    Past Surgical History:   Procedure Laterality Date     ARTHROPLASTY KNEE Right 2017     ARTHROSCOPY KNEE Left 2012     COLONOS[place]  [place]     COLONOSCOPY N/A 03/22/2021    F/U 2026 tubular adenomas     PROSTATE BIOPSY, NEEDLE, SATURATION SAMPLING  2017     PROSTATE BIOPSY, NEEDLE, SATURATION SAMPLING  05/2020    Benign     TONSILLECTOMY      No Comments Provided     VASECTOMY      No Comments Provided       Family History:    Family History   Problem Relation Age of Onset     Heart Failure Father      Dementia Father      Other - See Comments Mother         Dementia     Diabetes Brother      Heart Disease Other         Heart Disease,In distant relatives       Social History:  Marital Status:   [2]  Social History     Tobacco Use     Smoking status: Every Day     Types: Pipe     Smokeless tobacco: Never     Tobacco comments:     Quit smoking: refused   Substance Use Topics     Alcohol use: Yes     Comment: 14 a week      Drug use: No     Comment: Drug use: No        Medications:    acetaZOLAMIDE (DIAMOX) 250 MG tablet  finasteride (PROSCAR) 5 MG tablet  ibuprofen (ADVIL/MOTRIN) 800 MG tablet  losartan (COZAAR) 25 MG tablet  nabumetone (RELAFEN) 750 MG tablet  rosuvastatin (CRESTOR) 20 MG tablet          Review of Systems   Unable to perform ROS: Acuity of condition       Physical Exam   BP: (!) 121/101  Pulse: 80  Temp: 97.4  F (36.3  C)  Resp: 18  Height: 170.2 cm (5' 7\")  Weight: 87.9 kg (193 lb 11.2 oz)  SpO2: 95 %      Physical Exam  Vitals and nursing note reviewed.   Constitutional:       Appearance: Normal appearance.   HENT:      Head: Normocephalic and atraumatic.      Mouth/Throat:      Mouth: Mucous membranes are moist.   Eyes:      Extraocular Movements: " Extraocular movements intact.      Conjunctiva/sclera: Conjunctivae normal.   Cardiovascular:      Rate and Rhythm: Normal rate and regular rhythm.      Heart sounds: Normal heart sounds.   Pulmonary:      Effort: Pulmonary effort is normal.      Breath sounds: Normal breath sounds.   Abdominal:      General: Abdomen is flat.   Skin:     General: Skin is warm and dry.   Neurological:      Mental Status: He is alert.      Comments: Patient is nonverbal.  He is alert and looking at me.  When I speak to him he will focus on me.  He does try to follow some commands.  He has a dense left-sided hemiparesis and there is no effort of movement when I asked him to move his arms.  When I lift them off the are somewhat tight and he is resisting me somewhat.  Significant facial droop as well.  External ocular movements appear intact.   Psychiatric:         Behavior: Behavior normal.         ED Course                 Procedures                Results for orders placed or performed during the hospital encounter of 11/10/22 (from the past 24 hour(s))   CT Head w/o Contrast    Narrative    Exam: CT HEAD W/O CONTRAST      Exam reason: cva    Technique:   Axial images of the head obtained without contrast. Coronal and  sagittal reformations were generated. This CT was performed using one  or more of the following dose reduction techniques: automated exposure  control, adjustment of the mA and/or kV according to patient size,  and/or use of iterative reconstruction technique.    Comparison: None.        Findings:      The exam is mildly degraded by motion.    Parenchyma: There is focal hypodensity in the right cerebellum,  possibly representing an acute infarct.    There is also a focus of more circumscribed low-density in the upper  left cerebellum, likely a subacute to chronic infarct.    No intracranial hemorrhage. No mass effect. No midline shift. The  basilar cisterns are patent.    Extra-axial spaces: No extra-axial fluid  collection or hemorrhage.     Ventricles: Normal.  Paranasal sinuses: Clear.   Mastoid air cells: Clear.    Osseous: No acute findings.  Orbits: Normal.    Soft tissues: No acute findings.       Impression    Impression:  Focal hypodensity in the right cerebellum, likely representing an  acute infarct.    There is also more circumscribed low-density in the upper aspect of  the left cerebellum, likely a subacute chronic infarct.      No intracranial hemorrhage or mass effect.    These results were discussed with Dr. Alegria by Dr. Michael on  11/10/2022 3:23 PM.    REUBEN MICHAEL MD         SYSTEM ID:  [id number]   CTA Head Neck with Contrast    Narrative    Exam: CTA HEAD NECK W CONTRAST    Exam reason: cva    Technique:     Using helical CT technique, and IV contrast, thin section arterial  phase axial images of the neck and head ([place], [place]) were  performed, down to the level of the aortic arch.  Post-processing 2D  reformatted images of the carotid arteries and cerebral arteries were  performed, including coronal and sagittal reconstructions. MIP  reconstructions of the carotid arteries and cerebral arteries also  performed. This CT was performed using one or more of the following  dose reduction techniques: automated exposure control, adjustment of  the mA and/or kV according to patient size, and/or use of iterative  reconstruction technique.    Meds/Contrast: 75 ml isovue 370    Comparison: None.    Findings:    CTA of the neck:     Aortic arch: No significant abnormality of the aortic arch and the  origins of its branch vessels.       Right common carotid artery: No significant stenosis.     Right internal carotid artery: No hemodynamically significant  stenosis.  Mild to moderate calcified atherosclerosis at the carotid  bulb and proximal right ICA.    Left common carotid artery: No significant stenosis.     Left internal carotid artery: No hemodynamically significant stenosis.   Mild to moderate  calcified plaque at the carotid bifurcation.    Vertebral arteries: The right vertebral artery is dominant. No  hemodynamically significant stenosis. The left vertebral artery is  relatively small and terminates in a PICA branch.    * Note: Measurements of stenoses were done in accordance with NASCET  criteria. That is, the stenosis is calculated from the ratio of the  linear luminal diameter of the narrowest segment of the diseased  portion of the artery compared to the diameter of the artery beyond or  distal to any post stenotic dilatation.     CTA of the head ([place], [place]):     General: No occlusion, thrombosis, hemodynamically significant  stenosis or aneurysm about the [place].  There is abnormal  appearance of the distal basilar artery, possibly due to motion or  beam hardening artifact at the skull base or due to focal stenosis or  thrombus (for example series 8 image 236).   Anterior communicating artery: Anterior communicating artery is  present.  Posterior communicating arteries: The left posterior communicating  artery is patent. The right posterior communicating artery is  hypoplastic or absent.    Additional Findings:    Intracranial contents: No evidence of acute cortical infarct, infarct  in a major vascular territory, mass or enhancing abnormality.     Soft tissues of the neck: No acute findings.    Visualized lung apices: There is dependent atelectasis bilaterally.    Osseous: No acute osseous abnormalities.      Impression    Impression:  No definite large vessel occlusion. There is an abnormal appearance of  the distal basilar artery, which may be due to motion or beam  hardening artifact from the skull base given the location, however a  focal stenosis or intraluminal thrombus is also possible. An MRA of  the intracranial arteries could be considered.    No hemodynamically significant carotid stenosis. No hemodynamically  significant vertebral artery stenosis.    These results  were discussed with Dr. Alegria by Dr. Michael on  11/10/2022 3:23 PM.    REUBEN MICHAEL MD         SYSTEM ID:  [id number]   CBC with Platelets & Differential    Narrative    The following orders were created for panel order CBC with Platelets & Differential.  Procedure                               Abnormality         Status                     ---------                               -----------         ------                     CBC with platelets and d...[[id number]]  Abnormal            Final result                 Please view results for these tests on the individual orders.   Basic metabolic panel   Result Value Ref Range    Sodium 137 136 - 145 mmol/L    Potassium 4.2 3.4 - 5.3 mmol/L    Chloride 104 98 - 107 mmol/L    Carbon Dioxide (CO2) 22 22 - 29 mmol/L    Anion Gap 11 7 - 15 mmol/L    Urea Nitrogen 17.7 8.0 - 23.0 mg/dL    Creatinine 1.09 0.67 - 1.17 mg/dL    Calcium 8.8 8.8 - 10.2 mg/dL    Glucose 137 (H) 70 - 99 mg/dL    GFR Estimate 72 >60 mL/min/1.73m2   INR   Result Value Ref Range    INR 1.05 0.85 - 1.15   Partial thromboplastin time   Result Value Ref Range    aPTT 20 (L) 22 - 38 Seconds   Troponin T, High Sensitivity   Result Value Ref Range    Troponin T, High Sensitivity 11 <=22 ng/L   CBC with platelets and differential   Result Value Ref Range    WBC Count 9.0 4.0 - 11.0 10e3/uL    RBC Count 4.80 4.40 - 5.90 10e6/uL    Hemoglobin 15.6 13.3 - 17.7 g/dL    Hematocrit 45.1 40.0 - 53.0 %    MCV 94 78 - 100 fL    MCH 32.5 26.5 - 33.0 pg    MCHC 34.6 31.5 - 36.5 g/dL    RDW 12.2 10.0 - 15.0 %    Platelet Count 190 150 - 450 10e3/uL    % Neutrophils 90 %    % Lymphocytes 6 %    % Monocytes 4 %    % Eosinophils 0 %    % Basophils 0 %    % Immature Granulocytes 0 %    NRBCs per 100 WBC 0 <1 /100    Absolute Neutrophils 8.1 1.6 - 8.3 10e3/uL    Absolute Lymphocytes 0.6 (L) 0.8 - 5.3 10e3/uL    Absolute Monocytes 0.3 0.0 - 1.3 10e3/uL    Absolute Eosinophils 0.0 0.0 - 0.7 10e3/uL    Absolute Basophils 0.0  0.0 - 0.2 10e3/uL    Absolute Immature Granulocytes 0.0 <=0.4 10e3/uL    Absolute NRBCs 0.0 10e3/uL   Asymptomatic Influenza A/B & SARS-CoV2 (COVID-19) Virus PCR Multiplex Nasopharyngeal    Specimen: Nasopharyngeal; Swab   Result Value Ref Range    Influenza A PCR Negative Negative    Influenza B PCR Negative Negative    RSV PCR Negative Negative    SARS CoV2 PCR Negative Negative    Narrative    Testing was performed using the Xpert Xpress CoV2/Flu/RSV Assay on the Cepheid GeneXpert Instrument. This test should be ordered for the detection of SARS-CoV-2 and influenza viruses in individuals who meet clinical and/or epidemiological criteria. Test performance is unknown in asymptomatic patients. This test is for in vitro diagnostic use under the FDA EUA for laboratories certified under CLIA to perform high or moderate complexity testing. This test has not been FDA cleared or approved. A negative result does not rule out the presence of PCR inhibitors in the specimen or target RNA in concentration below the limit of detection for the assay. If only one viral target is positive but coinfection with multiple targets is suspected, the sample should be re-tested with another FDA cleared, approved, or authorized test, if coinfection would change clinical management. This test was validated by the [email or web address]. These laboratories are certified under the Clinical Laboratory Improvement Amendments of 1988 (CLIA-88) as qualified to perform high complexity laboratory testing.   Extra Tube    Narrative    The following orders were created for panel order Extra Tube.  Procedure                               Abnormality         Status                     ---------                               -----------         ------                     Extra Red Top Tube[[id number]]                               In process                 Extra Serum Separator Tu...[[id number]]                      In process                "    Please view results for these tests on the individual orders.     [facility] Procedure Note          Intubation:     Date/Time: 4:42 PM  Performed by: Bogdan Alegria MD    Consent was obtained after discussing the risks, benefits and alternatives with the Patient and Family.  Risks and benefits: risks, benefits and alternatives were discussed.  Patient identity confirmed: verbally with patient and arm band  Time out: Immediately prior to procedure a \"time out\" was called to verify the correct patient, procedure, equipment, support staff and site/side marked as required.    Indication: Acute respiratory failure. and Airway protection.    Intubation method: Glidescope  Patient status: RSI  Preoxygenation: Mask    Sedatives: Fentanyl and Midazolam (Versed)  Paralytic: rocuronium  Laryngoscope size: Mac 3  Tube size: 7.5 cuffed with cuff inflated after placement  Number of attempts: 1  Cricoid pressure: yes  Cords visualized: yes  Post-procedure assessment: Breath sounds equal bilaterally with chest rise and absent over the epigastrium, Chest x-ray interpreted by me demonstrating endotracheal tube in appropriate position and CO2 detector.  ETT to teeth: 26 cm  Tube secured with: ETT guardado    Patient tolerated the procedure well with no immediate complications.  Complications:  None      Initial postintubation chest x-ray she does not show endotracheal tube.  He was 22 cm at the teeth at that point.  We advanced a 24 cm.  The tube was then just barely visible so is then advanced another 2 cm.  A third x-ray was not obtained.    Medications   ondansetron (ZOFRAN) injection 4 mg (4 mg Intravenous Given 11/10/22 1445)   iopamidol (ISOVUE-370) solution 75 mL (165 mLs Intravenous Given 11/10/22 1505)   fentaNYL (PF) (SUBLIMAZE) injection 100 mcg (100 mcg Intravenous Given 11/10/22 1616)   midazolam (VERSED) injection 10 mg (10 mg Intravenous Given 11/10/22 1613)   rocuronium injection 120 mg (120 mg Intravenous " Given 11/10/22 1616)       Assessments & Plan (with Medical Decision Making)     I have reviewed the nursing notes.    I have reviewed the findings, diagnosis, plan and need for follow up with the patient.  Tier 1 stroke code called on arrival.  I was speaking with the stroke team at the [facility], Dr. Choi,.  They were able to see the imaging in real-time.  There does appear to be subacute cerebellar changes, making us concerned that the dizziness he has been having recently may have been due to her recent stroke.  Given this it was decided that he is not a candidate for tenecteplase.  They recommended that he be transferred to the closest facility that can do thrombectomy.  That is [place], I spoke with Dr. Aaron him and Dr. Mitchell at [facility] in [place], and they have accepted the patient in transfer.  Due to the weather flight is not available and he will have to go by ground.  He has been having sonorous respirations and is unable to control his secretions.  Given this I discussed with his wife and the patient that I felt it would be necessary to intubate him to protect his airway during transport.  The wife agreed.  The patient really was unable to communicate with me regarding his feelings on this.  Vital signs remained stable throughout his stay here and he is currently being loaded into the ambulance at this time.    Critical Care time was 130 minutes for this patient excluding procedures.      New Prescriptions    No medications on file       Final diagnoses:   Cerebrovascular accident (CVA) due to occlusion of basilar artery (H)       11/10/2022   [facility]     Bogdan Alegria MD  11/10/22 3154       Bogdan Alegria MD  11/10/22 1523

## 2022-11-10 NOTE — ED TRIAGE NOTES
Patient comes to ER via EMS from [facility]. Per EMS, patient was at [facility] today LKW 1300. Started to develop left-sided weakness per friends at [facility] and eventually lost the ability to speak.  en route. MD at bedside. Stroke tier 1 initiated. Patient is gurgling with snoring respirations.     Triage Assessment       Row Name 11/10/22 1441       Triage Assessment (Adult)    Airway WDL airway symptoms    Airway Interventions suctioned secretions/vomitus  oral

## 2022-11-10 NOTE — PHARMACY-ADMISSION MEDICATION HISTORY
Pharmacy -- Admission Medication Reconciliation- IN PROGRESS    Prior to admission (PTA) medications were reviewed and the patient's PTA medication list was updated.    Sources Consulted: [facility] fax, [employer]s  **Attempted to call patient's wife x2 (Virgie [phone])- no answer, no voicemail set up      The reliability of this Medication Reconciliation is: Reliability: Borderline reliable    [facility] Losartan, Finasteride, ASA 81 mg (not [facility]ensed by [facility]), and Crestor. No anticoagulants on file.    Bob Carroll [facility], 11/10/2022,  3:17 PM

## 2022-11-11 LAB
ATRIAL RATE - MUSE: 80 BPM
DIASTOLIC BLOOD PRESSURE - MUSE: NORMAL MMHG
INTERPRETATION ECG - MUSE: NORMAL
P AXIS - MUSE: 37 DEGREES
PR INTERVAL - MUSE: 118 MS
QRS DURATION - MUSE: 130 MS
QT - MUSE: 424 MS
QTC - MUSE: 489 MS
R AXIS - MUSE: 1 DEGREES
SYSTOLIC BLOOD PRESSURE - MUSE: NORMAL MMHG
T AXIS - MUSE: 17 DEGREES
VENTRICULAR RATE- MUSE: 80 BPM

## 2022-12-15 ENCOUNTER — NURSING HOME VISIT (OUTPATIENT)
Dept: GERIATRICS | Facility: OTHER | Age: 72
End: 2022-12-15
Attending: NURSE PRACTITIONER
Payer: MEDICARE

## 2022-12-15 DIAGNOSIS — I69.322 DYSARTHRIA AS LATE EFFECT OF CEREBELLAR CEREBROVASCULAR ACCIDENT (CVA): ICD-10-CM

## 2022-12-15 DIAGNOSIS — M25.512 CHRONIC LEFT SHOULDER PAIN: ICD-10-CM

## 2022-12-15 DIAGNOSIS — G89.29 CHRONIC LEFT SHOULDER PAIN: ICD-10-CM

## 2022-12-15 DIAGNOSIS — I69.354 FLACCID HEMIPLEGIA OF LEFT NONDOMINANT SIDE AS LATE EFFECT OF CEREBRAL INFARCTION (H): ICD-10-CM

## 2022-12-15 DIAGNOSIS — N40.0 BENIGN PROSTATIC HYPERPLASIA WITHOUT URINARY OBSTRUCTION: ICD-10-CM

## 2022-12-15 DIAGNOSIS — F10.20 UNCOMPLICATED ALCOHOL DEPENDENCE (H): ICD-10-CM

## 2022-12-15 DIAGNOSIS — I63.12 ACUTE STROKE DUE TO EMBOLISM OF BASILAR ARTERY (H): Primary | ICD-10-CM

## 2022-12-15 DIAGNOSIS — R13.12 OROPHARYNGEAL DYSPHAGIA: ICD-10-CM

## 2022-12-15 DIAGNOSIS — I10 BENIGN ESSENTIAL HYPERTENSION: ICD-10-CM

## 2022-12-15 PROCEDURE — 99356 PR PROLONGED SERV,INPATIENT,1ST HR: CPT | Performed by: NURSE PRACTITIONER

## 2022-12-15 PROCEDURE — 99310 SBSQ NF CARE HIGH MDM 45: CPT | Performed by: NURSE PRACTITIONER

## 2022-12-15 RX ORDER — LANOLIN ALCOHOL/MO/W.PET/CERES
100 CREAM (GRAM) TOPICAL DAILY
COMMUNITY
Start: 2022-12-15 | End: 2023-02-08

## 2022-12-15 RX ORDER — PNV,CALCIUM 72/IRON/FOLIC ACID 27 MG-1 MG
1 TABLET ORAL DAILY
COMMUNITY
Start: 2022-12-15 | End: 2023-06-16

## 2022-12-15 RX ORDER — SENNA AND DOCUSATE SODIUM 50; 8.6 MG/1; MG/1
1 TABLET, FILM COATED ORAL 2 TIMES DAILY PRN
COMMUNITY
Start: 2022-12-15

## 2022-12-15 RX ORDER — LISINOPRIL 10 MG/1
10 TABLET ORAL DAILY
COMMUNITY
Start: 2022-12-15 | End: 2023-02-08

## 2022-12-15 RX ORDER — ATORVASTATIN CALCIUM 80 MG/1
80 TABLET, FILM COATED ORAL DAILY
COMMUNITY
Start: 2022-12-15 | End: 2023-02-08

## 2022-12-15 RX ORDER — ACETAMINOPHEN 325 MG/1
325-650 TABLET ORAL EVERY 4 HOURS PRN
COMMUNITY
Start: 2022-12-15

## 2022-12-15 NOTE — PROGRESS NOTES
[facility]  Acute Care Visit  Hospital follow up    Patient Name: Ammon Harding   : 1950  MRN: [id number]    Place of Service: [facility]  DOS: 12/15/2022    CC: hospital follow up    HPI:  Ammon Harding is a 72 year old male with PMH of multiple chronic medical concerns, who is seen today regarding pt was hospitalized 11/10- [facility] and then transferred to [facility] for therapy and was admitted to [facility] short term rehab for further therapy on Dec 13th.     Pt was initially hospitalized at [facility] for acute embolism of basilar artery. It was determined a thrombectomy could not be performed due to finding a chronic occlusion of right vertebral artery and left vertebral artery clear and per MRI there were insults to bilateral cerebellar hemisphere. Pt has paralysis of left side, left facial droop, dysarthria, and dysphagia. Today he did show me that he could lift his left leg slightly off wheelchair foot pedal and this gave him much encouragement. He is still unable to move left arm,hand or fingers. He does have full sensation.     Pt had feeding tube while at [facility] but swallowing did improve and he is eating soft foods now with thickened liquids.   He was started on eliquis and atorvastatin (stopped crestor).   TTE showed EF 60%.   Neurology follow up scheduled in Feb and PCP follow up Dr Ramos in .     Pt has hx of daily alcohol use. He is currently taking prenatal vitamin and thiamine.     HTN: currently taking lisinopril (cozaar stopped). BPs at [facility] are ranging over past 2 days in the 120s and HRs 60-70s.     BPH: pt denies trouble urinating other than a mild hesitancy. He was taking proscar but this was stopped in hospital.     Pt reports today he has some mild left shoulder and left thumb pain. He had e-stim done in therapy today and a sling applied for support to left arm. He denies wanting to try or feeling a need to try  voltaren gel and was content with tylenol for pain.       Multidisciplinary notes, laboratory values, medications, vital signs, weight and orders arereviewed from nursing home records. I have reviewed the patient s medical history and updated the computerized patient record.     PMH:  Past Medical History:   Diagnosis Date     Elevated prostate specific antigen (PSA)     2106,psa 5.1     Hyperlipidemia      Hypertension      Osteoarthritis        Medications:  Current Outpatient Medications   Medication Sig Dispense Refill     acetaminophen (TYLENOL) 325 MG tablet Take 1-2 tablets (325-650 mg) by mouth every 4 hours as needed for mild pain       apixaban ANTICOAGULANT (ELIQUIS) 5 MG tablet Take 1 tablet (5 mg) by mouth 2 times daily       atorvastatin (LIPITOR) 80 MG tablet Take 1 tablet (80 mg) by mouth daily       lisinopril (ZESTRIL) 10 MG tablet Take 1 tablet (10 mg) by mouth daily       omeprazole (PRILOSEC) 20 MG DR capsule Take 1 capsule (20 mg) by mouth daily       Prenatal Vit-Fe Fumarate-FA (PREPLUS) 27-1 MG TABS Take 1 tablet by mouth daily       SENNA-docusate sodium (SENNA S) 8.6-50 MG tablet Take 1 tablet by mouth 2 times daily as needed (constipation)       thiamine (B-1) 100 MG tablet Take 1 tablet (100 mg) by mouth daily       Medication reconciliation complete between Epic record and NH MAR.     Allergies:  No Known Allergies    Review of Systems:  +mild left shoulder and left thumb discomfort  +mild swallow trouble-reports in thickened liquids  +noted dysarthria but in good spirits and feels this has improved and will cont to do so  Denies constipation/diarrhea  Denies trouble urinating---feels he may have some hesitancy but not a big concern  Denies chest pain, shortness of breath, cough  Denies palpitations  Denies dizziness/light-headedness    Vital Signs:  Temp 97.4   Pulse 73   Respirations 18   /78   Oxygen Sats 95%   Weight 175.4#    Physical Exam:     Pleasant and alert without  distress. Left facial droop.  Noted mild dysarthria but able to understand quite well.   Sclera nonicteric, conjunctiva non-inflamed.  Skin color pink. Mucous membranes moist.   Neck supple and without adenopathy   Lungs clear to auscultation with LUZ quite dim. No wheezes or rales noted.   Cardiovascular regular with q 3rd beat a missed beat noted. No murmur noted.  Abdomen soft and without tenderness   Extremities without edema. Left AFO brace on. He is able to lift left leg off wheelchair foot rest a tiny amount.  Left arm is flaccid and not able to move fingers at all. He is able to feel sensation. Left arm in supportive sling.   Able to move upper and lower Right extremities       New Labs/Diagnostics:  Last Comprehensive Metabolic Panel:  Sodium   Date Value Ref Range Status   11/10/2022 137 136 - 145 mmol/L Final   07/10/2020 142 134 - 144 mmol/L Final     Potassium   Date Value Ref Range Status   11/10/2022 4.2 3.4 - 5.3 mmol/L Final   07/10/2020 4.3 3.5 - 5.1 mmol/L Final     Chloride   Date Value Ref Range Status   11/10/2022 104 98 - 107 mmol/L Final   07/10/2020 107 98 - 107 mmol/L Final     Carbon Dioxide   Date Value Ref Range Status   07/10/2020 25 21 - 31 mmol/L Final     Carbon Dioxide (CO2)   Date Value Ref Range Status   11/10/2022 22 22 - 29 mmol/L Final     Anion Gap   Date Value Ref Range Status   11/10/2022 11 7 - 15 mmol/L Final   07/10/2020 10 3 - 14 mmol/L Final     Glucose   Date Value Ref Range Status   11/10/2022 137 (H) 70 - 99 mg/dL Final   07/10/2020 77 70 - 105 mg/dL Final     Urea Nitrogen   Date Value Ref Range Status   11/10/2022 17.7 8.0 - 23.0 mg/dL Final   07/10/2020 23 7 - 25 mg/dL Final     Creatinine   Date Value Ref Range Status   11/10/2022 1.09 0.67 - 1.17 mg/dL Final   07/10/2020 1.26 0.70 - 1.30 mg/dL Final     GFR Estimate   Date Value Ref Range Status   11/10/2022 72 >60 mL/min/1.73m2 Final     Comment:     Effective December 21, 2021 eGFRcr in adults is calculated  using the 2021 CKD-EPI creatinine equation which includes age and gender (Taya et al., [place], DOI: 10.1056/VKGTjp9752605)   07/10/2020 57 (L) >60 mL/min/[1.73_m2] Final     Calcium   Date Value Ref Range Status   11/10/2022 8.8 8.8 - 10.2 mg/dL Final   07/10/2020 9.4 8.6 - 10.3 mg/dL Final     Lab Results   Component Value Date    WBC 9.0 11/10/2022     Lab Results   Component Value Date    RBC 4.80 11/10/2022     Lab Results   Component Value Date    HGB 15.6 11/10/2022    HGB 15.6 07/30/2020     Lab Results   Component Value Date    HCT 45.1 11/10/2022    HCT 38.5 10/19/2017     No components found for: MCT  Lab Results   Component Value Date    MCV 94 11/10/2022    MCV 92 10/19/2017     Lab Results   Component Value Date    MCH 32.5 11/10/2022    MCH 31.3 10/19/2017     Lab Results   Component Value Date    MCHC 34.6 11/10/2022    MCHC 34.0 10/19/2017     Lab Results   Component Value Date    RDW 12.2 11/10/2022    RDW 12.1 10/19/2017     Lab Results   Component Value Date     11/10/2022     10/19/2017     Reviewed labs from [facility] and reviewed labs from [facility]    Reviewed documents from [facility] stay and [facility] ED visit 11/10/22.      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     A total of 92 minutes was spent with this patient, of which more than 50% was spent in counseling and/or coordination of care.     SHIMA Celaya, CNP ....................  12/15/2022   4:28 PM

## 2022-12-21 ENCOUNTER — LAB REQUISITION (OUTPATIENT)
Dept: LAB | Facility: OTHER | Age: 72
End: 2022-12-21
Payer: MEDICARE

## 2022-12-21 ENCOUNTER — NURSING HOME VISIT (OUTPATIENT)
Dept: GERIATRICS | Facility: OTHER | Age: 72
End: 2022-12-21
Attending: NURSE PRACTITIONER
Payer: MEDICARE

## 2022-12-21 DIAGNOSIS — I69.391 DYSPHAGIA FOLLOWING CEREBRAL INFARCTION: ICD-10-CM

## 2022-12-21 DIAGNOSIS — H57.89 REDNESS OF LEFT EYE: Primary | ICD-10-CM

## 2022-12-21 LAB
ALBUMIN SERPL BCG-MCNC: 3.6 G/DL (ref 3.5–5.2)
ALP SERPL-CCNC: 161 U/L (ref 40–129)
ALT SERPL W P-5'-P-CCNC: 48 U/L (ref 10–50)
ANION GAP SERPL CALCULATED.3IONS-SCNC: 9 MMOL/L (ref 7–15)
AST SERPL W P-5'-P-CCNC: 53 U/L (ref 10–50)
BASOPHILS # BLD AUTO: 0.1 10E3/UL (ref 0–0.2)
BASOPHILS NFR BLD AUTO: 1 %
BILIRUB SERPL-MCNC: 0.4 MG/DL
BUN SERPL-MCNC: 18.2 MG/DL (ref 8–23)
CALCIUM SERPL-MCNC: 9.2 MG/DL (ref 8.8–10.2)
CHLORIDE SERPL-SCNC: 107 MMOL/L (ref 98–107)
CREAT SERPL-MCNC: 0.86 MG/DL (ref 0.67–1.17)
DEPRECATED HCO3 PLAS-SCNC: 25 MMOL/L (ref 22–29)
EOSINOPHIL # BLD AUTO: 0.1 10E3/UL (ref 0–0.7)
EOSINOPHIL NFR BLD AUTO: 2 %
ERYTHROCYTE [DISTWIDTH] IN BLOOD BY AUTOMATED COUNT: 12.3 % (ref 10–15)
GFR SERPL CREATININE-BSD FRML MDRD: >90 ML/MIN/1.73M2
GLUCOSE SERPL-MCNC: 139 MG/DL (ref 70–99)
HCT VFR BLD AUTO: 38.7 % (ref 40–53)
HGB BLD-MCNC: 13 G/DL (ref 13.3–17.7)
IMM GRANULOCYTES # BLD: 0 10E3/UL
IMM GRANULOCYTES NFR BLD: 0 %
LYMPHOCYTES # BLD AUTO: 1.3 10E3/UL (ref 0.8–5.3)
LYMPHOCYTES NFR BLD AUTO: 16 %
MCH RBC QN AUTO: 31.6 PG (ref 26.5–33)
MCHC RBC AUTO-ENTMCNC: 33.6 G/DL (ref 31.5–36.5)
MCV RBC AUTO: 94 FL (ref 78–100)
MONOCYTES # BLD AUTO: 0.5 10E3/UL (ref 0–1.3)
MONOCYTES NFR BLD AUTO: 6 %
NEUTROPHILS # BLD AUTO: 6 10E3/UL (ref 1.6–8.3)
NEUTROPHILS NFR BLD AUTO: 75 %
NRBC # BLD AUTO: 0 10E3/UL
NRBC BLD AUTO-RTO: 0 /100
PLATELET # BLD AUTO: 223 10E3/UL (ref 150–450)
POTASSIUM SERPL-SCNC: 4.6 MMOL/L (ref 3.4–5.3)
PROT SERPL-MCNC: 6 G/DL (ref 6.4–8.3)
RBC # BLD AUTO: 4.12 10E6/UL (ref 4.4–5.9)
SODIUM SERPL-SCNC: 141 MMOL/L (ref 136–145)
WBC # BLD AUTO: 8 10E3/UL (ref 4–11)

## 2022-12-21 PROCEDURE — 80053 COMPREHEN METABOLIC PANEL: CPT | Performed by: NURSE PRACTITIONER

## 2022-12-21 PROCEDURE — 99308 SBSQ NF CARE LOW MDM 20: CPT | Performed by: NURSE PRACTITIONER

## 2022-12-21 PROCEDURE — 85025 COMPLETE CBC W/AUTO DIFF WBC: CPT | Performed by: NURSE PRACTITIONER

## 2022-12-26 NOTE — PROGRESS NOTES
[facility]  Acute Care Visit    Patient Name: Ammon Harding   : 1950  MRN: [id number]    Place of Service: [facility]  DOS: 2022    CC: eye problem    HPI:  Ammon Harding is a 72 year old male with PMH of multiple chronic medical concerns, who is seen today regarding left eye sclera is red, appears to be more like bleeding vs inflammation. Pt is taking blood thinners s/p embolic stroke. He denies any vision changes or pain in eye. If nursing staff had not told him they saw blood in his eye, he reports he would not know it was there.   NO fever, vitals stable. Denies headaches.       Multidisciplinary notes, laboratory values, medications, vital signs, weight and orders arereviewed from nursing home records. I have reviewed the patient s medical history and updated the computerized patient record.     PMH:  Past Medical History:   Diagnosis Date     Elevated prostate specific antigen (PSA)     ,psa 5.1     Hyperlipidemia      Hypertension      Osteoarthritis        Medications:  Current Outpatient Medications   Medication Sig Dispense Refill     acetaminophen (TYLENOL) 325 MG tablet Take 1-2 tablets (325-650 mg) by mouth every 4 hours as needed for mild pain       apixaban ANTICOAGULANT (ELIQUIS) 5 MG tablet Take 1 tablet (5 mg) by mouth 2 times daily       atorvastatin (LIPITOR) 80 MG tablet Take 1 tablet (80 mg) by mouth daily       lisinopril (ZESTRIL) 10 MG tablet Take 1 tablet (10 mg) by mouth daily       omeprazole (PRILOSEC) 20 MG DR capsule Take 1 capsule (20 mg) by mouth daily       Prenatal Vit-Fe Fumarate-FA (PREPLUS) 27-1 MG TABS Take 1 tablet by mouth daily       SENNA-docusate sodium (SENNA S) 8.6-50 MG tablet Take 1 tablet by mouth 2 times daily as needed (constipation)       thiamine (B-1) 100 MG tablet Take 1 tablet (100 mg) by mouth daily       Medication reconciliation complete between [facility] record and NH MAR.     Allergies:  No Known Allergies    Review  of Systems:  Denies vision changes  Denies pain in eye  Denies coughing hard  Denies headache  Denies falling or any acute injury to eye    Vital Signs:  Temp 97.3   Pulse 68   Respirations 18   /78   Oxygen Sats 95%   Weight 177.9#    Physical Exam:     Pleasant and alert without distress.    Sclera nonicteric, conjunctiva non-inflamed except left eye with some blood noted in sclera left eye on lower portion. No pain or vision changes  Skin color pink.   Hemiparesis    Assessment/Plan:  (H57.89) Redness of left eye  (primary encounter diagnosis)  Comment: no pain or vision changes  Plan: follow up with opthamology        A total of 15 minutes was spent with this patient, of which more than 50% was spent in counseling and/or coordination of care.     SHIMA Celaya, CNP ....................  12/26/2022   3:16 PM

## 2023-01-03 ENCOUNTER — OFFICE VISIT (OUTPATIENT)
Dept: INTERNAL MEDICINE | Facility: OTHER | Age: 73
End: 2023-01-03
Attending: INTERNAL MEDICINE
Payer: MEDICARE

## 2023-01-03 VITALS
BODY MASS INDEX: 28.35 KG/M2 | OXYGEN SATURATION: 96 % | TEMPERATURE: 97 F | RESPIRATION RATE: 18 BRPM | WEIGHT: 181 LBS | HEART RATE: 70 BPM | SYSTOLIC BLOOD PRESSURE: 120 MMHG | DIASTOLIC BLOOD PRESSURE: 72 MMHG

## 2023-01-03 DIAGNOSIS — I69.354 FLACCID HEMIPLEGIA OF LEFT NONDOMINANT SIDE AS LATE EFFECT OF CEREBRAL INFARCTION (H): Primary | ICD-10-CM

## 2023-01-03 DIAGNOSIS — I63.12 ACUTE STROKE DUE TO EMBOLISM OF BASILAR ARTERY (H): ICD-10-CM

## 2023-01-03 DIAGNOSIS — I10 BENIGN ESSENTIAL HYPERTENSION: ICD-10-CM

## 2023-01-03 DIAGNOSIS — E78.49 OTHER HYPERLIPIDEMIA: ICD-10-CM

## 2023-01-03 PROCEDURE — 99214 OFFICE O/P EST MOD 30 MIN: CPT | Performed by: INTERNAL MEDICINE

## 2023-01-03 PROCEDURE — G0463 HOSPITAL OUTPT CLINIC VISIT: HCPCS | Performed by: INTERNAL MEDICINE

## 2023-01-03 ASSESSMENT — ENCOUNTER SYMPTOMS
GASTROINTESTINAL NEGATIVE: 1
RESPIRATORY NEGATIVE: 1
CARDIOVASCULAR NEGATIVE: 1

## 2023-01-03 ASSESSMENT — PAIN SCALES - GENERAL: PAINLEVEL: NO PAIN (0)

## 2023-01-03 NOTE — PROGRESS NOTES
ICD-10-CM    1. 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 Other hyperlipidemia  E78.49       4. Benign essential hypertension  I10           He will continue as is.  It is evident that he is going to be left with some substantial left hemiparesis, especially in his left upper extremity.  At this point in time his wife is not able to care for him at home so it is not really clear what the long-term plan will be for him.  For now he will stay on his same medications and continue with therapies.  No lab or other treatment needed at this time.    Ayla Verde is a 72 year old accompanied by his spouse, presenting for the following health issues:  [facility])      He is here for a nursing home recertification visit.  He had an unfortunate event with a basilar artery stroke that left him with left hemiparesis.  It sounds as though he probably had symptoms that were progressing over the week prior to his initial presentation to medical care.  He was having extreme vertigo and was needing to walk with a cane because of the vertigo and some weakness.  Subsequently he came to the emergency room and was transferred to [place].  Angiogram showed that he had a basilar artery embolism that was not amenable to intervention.  He was stabilized, transferred to a nursing home and then subsequently transferred locally to a nursing home where he currently resides.    He has not had no acute illnesses or issues since being at the nursing home.  He is limited by left hemiparesis.  He basically has no function of his left arm.  He has some use of his left leg and is able to ambulate some with assistance.  He has some dysarthria.  He denies any difficulties with swallowing.  He is obviously quite frustrated by this life-changing event.    His hospital notes are reviewed.  Recent testing and  labs reviewed.  Nursing home notes and medications are reviewed and reconciled.    History of Present Illness       Reason for visit:  Stroke follow up    He eats 0-1 servings of fruits and vegetables daily.He consumes 1 sweetened beverage(s) daily.He exercises with enough effort to increase his heart rate 9 or less minutes per day.  He exercises with enough effort to increase his heart rate 3 or less days per week.   He is taking medications regularly.         Current Outpatient Medications   Medication     acetaminophen (TYLENOL) 325 MG tablet     apixaban ANTICOAGULANT (ELIQUIS) 5 MG tablet     atorvastatin (LIPITOR) 80 MG tablet     lisinopril (ZESTRIL) 10 MG tablet     omeprazole (PRILOSEC) 20 MG DR capsule     Prenatal Vit-Fe Fumarate-FA (PREPLUS) 27-1 MG TABS     SENNA-docusate sodium (SENNA S) 8.6-50 MG tablet     thiamine (B-1) 100 MG tablet     No current facility-administered medications for this visit.         Review of Systems   Respiratory: Negative.    Cardiovascular: Negative.    Gastrointestinal: Negative.             Objective    There were no vitals taken for this visit.  There is no height or weight on file to calculate BMI.  Physical Exam  Vitals and nursing note reviewed.   Constitutional:       General: He is not in acute distress.     Appearance: Normal appearance. He is not ill-appearing, toxic-appearing or diaphoretic.   Cardiovascular:      Rate and Rhythm: Normal rate and regular rhythm.      Heart sounds: Normal heart sounds.   Pulmonary:      Effort: Pulmonary effort is normal.      Breath sounds: Normal breath sounds. No wheezing, rhonchi or rales.   Neurological:      Mental Status: He is alert.      Comments: Left hemiparesis

## 2023-01-25 ENCOUNTER — NURSING HOME VISIT (OUTPATIENT)
Dept: GERIATRICS | Facility: OTHER | Age: 73
End: 2023-01-25
Attending: NURSE PRACTITIONER
Payer: MEDICARE

## 2023-01-25 DIAGNOSIS — I10 BENIGN ESSENTIAL HYPERTENSION: ICD-10-CM

## 2023-01-25 DIAGNOSIS — N40.0 BENIGN PROSTATIC HYPERPLASIA WITHOUT URINARY OBSTRUCTION: ICD-10-CM

## 2023-01-25 DIAGNOSIS — I69.354 FLACCID HEMIPLEGIA OF LEFT NONDOMINANT SIDE AS LATE EFFECT OF CEREBRAL INFARCTION (H): Primary | ICD-10-CM

## 2023-01-25 DIAGNOSIS — I69.322 DYSARTHRIA AS LATE EFFECT OF CEREBELLAR CEREBROVASCULAR ACCIDENT (CVA): ICD-10-CM

## 2023-01-25 DIAGNOSIS — N18.2 CHRONIC KIDNEY DISEASE, STAGE 2 (MILD): ICD-10-CM

## 2023-01-25 PROCEDURE — 99418 PROLNG IP/OBS E/M EA 15 MIN: CPT | Performed by: NURSE PRACTITIONER

## 2023-01-25 PROCEDURE — 99310 SBSQ NF CARE HIGH MDM 45: CPT | Performed by: NURSE PRACTITIONER

## 2023-01-26 ENCOUNTER — TELEPHONE (OUTPATIENT)
Dept: INTERNAL MEDICINE | Facility: OTHER | Age: 73
End: 2023-01-26
Payer: MEDICARE

## 2023-01-26 NOTE — TELEPHONE ENCOUNTER
[facility] would like to verify that physician would follow for home care.    Jasmyn Baumann on 1/26/2023 at 10:51 AM'

## 2023-01-26 NOTE — TELEPHONE ENCOUNTER
Left message for nurse at [facility] to return call.     AMBER FORMAN RN on 1/26/2023 at 12:12 PM

## 2023-01-27 NOTE — TELEPHONE ENCOUNTER
I spoke with Tamiko at [facility].  She did verify the patients last name and date of birth.  I did relay to her that Dr. Ramos has agreed to continue to follow patient.    Daren Koehler .......  1/27/2023  8:11 AM

## 2023-01-30 ENCOUNTER — TELEPHONE (OUTPATIENT)
Dept: INTERNAL MEDICINE | Facility: OTHER | Age: 73
End: 2023-01-30
Payer: MEDICARE

## 2023-01-30 PROBLEM — I69.354 FLACCID HEMIPLEGIA OF LEFT NONDOMINANT SIDE AS LATE EFFECT OF CEREBRAL INFARCTION (H): Status: ACTIVE | Noted: 2022-12-15

## 2023-01-30 PROBLEM — I69.322 DYSARTHRIA AS LATE EFFECT OF CEREBELLAR CEREBROVASCULAR ACCIDENT (CVA): Status: ACTIVE | Noted: 2022-12-15

## 2023-01-30 PROCEDURE — G0180 MD CERTIFICATION HHA PATIENT: HCPCS | Performed by: INTERNAL MEDICINE

## 2023-01-30 NOTE — TELEPHONE ENCOUNTER
Rox, PT with [facility], called and requested verbal orders for PT.    Twice a week for three weeks then once a week for 5 weeks to address transfers, balance, and gait.    Okay to leave detailed message.      Radha Motley on 1/30/2023 at 4:43 PM

## 2023-01-30 NOTE — PROGRESS NOTES
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      Pt was initially hospitalized at [facility] for acute embolism of basilar artery. It was determined a thrombectomy could not be performed due to finding a chronic occlusion of right vertebral artery and left vertebral artery clear and per MRI there were insults to bilateral cerebellar hemisphere. Pt has paralysis of left side, left facial droop, dysarthria, and dysphagia. He is able to lift his left leg slightly off wheelchair foot pedal and he has progressed in therapy to walking with a hemiwalker but still unable to move left arm,hand or fingers. He does have full sensation.      Pt had feeding tube while at [facility] but swallowing did improve and he is eating soft foods now with thickened liquids. During his stay at [facility] he was safely progressed to thin liquids.     He was started on eliquis and atorvastatin (stopped crestor).   TTE showed EF 60%.   Neurology [place] follow up scheduled  for CT of head and neck and PCP follow up discharge from SNF Feb  8th.   Neurology stroke follow up Feb 17th with Dr Cortez Bowlesuth.      Pt has hx of daily alcohol use. He is currently taking prenatal vitamin and thiamine.      HTN: currently taking lisinopril (cozaar stopped). BPs at [facility] are ranging 110-130s and HRs 60-70s.      BPH: pt denies trouble urinating other than a mild hesitancy. He was taking proscar but this was stopped in hospital.      Pt plans to discharge to home Jan 28th with his wife and daughter for support. He was given info on PAL alert system.   Pt has a 2ww, 4ww, sockaid, shoe horn, shower bench, hemiwalker, grab bars and pt will be receiving wheelchair for longer distances per therapy recommendations.   He will have PT/OT with [facility].  Per therapy pt is SBA for all ADLs.     Multidisciplinary notes, laboratory values, medications, vital signs, weight and orders arereviewed from nursing home records. I have reviewed the patient s medical history and updated the computerized patient record.     PMH:  Past Medical History:   Diagnosis Date     Acute cerebrovascular accident (CVA) due to embolism of basilar artery (H) 11/2022    Left hemiparesis     Elevated prostate specific antigen (PSA)     2106,psa 5.1     Hyperlipidemia      Hypertension      Osteoarthritis        Medications:  Current Outpatient Medications   Medication Sig Dispense Refill     acetaminophen (TYLENOL) 325 MG tablet Take 1-2 tablets (325-650 mg) by mouth every 4 hours as needed for mild pain       apixaban ANTICOAGULANT (ELIQUIS) 5 MG tablet Take 1 tablet (5 mg) by mouth 2 times daily       atorvastatin (LIPITOR) 80 MG tablet Take 1 tablet (80 mg) by mouth daily       lisinopril (ZESTRIL) 10 MG tablet Take 1 tablet (10 mg) by mouth daily       omeprazole (PRILOSEC) 20 MG DR capsule Take 1 capsule (20 mg) by mouth daily       Prenatal Vit-Fe Fumarate-FA (PREPLUS) 27-1 MG TABS Take 1 tablet by mouth daily       SENNA-docusate sodium (SENNA S) 8.6-50 MG  tablet Take 1 tablet by mouth 2 times daily as needed (constipation)       thiamine (B-1) 100 MG tablet Take 1 tablet (100 mg) by mouth daily       Medication reconciliation complete between Epic record and NH MAR.     Allergies:  No Known Allergies    Review of Systems:  Denies headaches  Denies vision changes  Denies swallowing or chewing trouble/concerns  Denies chest pain, palpitations, shortness of breath, cough  +left hand/arm no movement but able to feel  +left leg with some movement and able to walk with hemiwalker  Denies trouble urinating  Denies constipation/diarrhea  Denies heartburn    Vital Signs:  Temp 97.3   Pulse 76   Respirations 16   /75   Oxygen Sats 96%   Weight 171.2#    Physical Exam:     Pleasant and alert without distress.    Sclera nonicteric, conjunctiva non-inflamed.  Skin color pink. Mucous membranes moist. Left facial droop  Lungs clear to auscultation throughout. No wheezes or rales noted.   Cardiovascular regular, S1, S2 auscultated. No murmur noted.  Abdomen soft and without tenderness   Left upper extremity in sling, dim sensation but no movement  Left leg AFO brace.   Extremities without edema.    Hemiparesis    New Labs/Diagnostics:  Lab Results   Component Value Date    WBC 8.0 12/21/2022     Lab Results   Component Value Date    RBC 4.12 12/21/2022     Lab Results   Component Value Date    HGB 13.0 12/21/2022    HGB 15.6 07/30/2020     Lab Results   Component Value Date    HCT 38.7 12/21/2022    HCT 38.5 10/19/2017     No components found for: MCT  Lab Results   Component Value Date    MCV 94 12/21/2022    MCV 92 10/19/2017     Lab Results   Component Value Date    MCH 31.6 12/21/2022    MCH 31.3 10/19/2017     Lab Results   Component Value Date    MCHC 33.6 12/21/2022    MCHC 34.0 10/19/2017     Lab Results   Component Value Date    RDW 12.3 12/21/2022    RDW 12.1 10/19/2017     Lab Results   Component Value Date     12/21/2022     10/19/2017     Last  Comprehensive Metabolic Panel:  Sodium   Date Value Ref Range Status   12/21/2022 141 136 - 145 mmol/L Final   07/10/2020 142 134 - 144 mmol/L Final     Potassium   Date Value Ref Range Status   12/21/2022 4.6 3.4 - 5.3 mmol/L Final     Comment:     Specimen slightly hemolyzed, potassium may be falsely elevated.   07/10/2020 4.3 3.5 - 5.1 mmol/L Final     Chloride   Date Value Ref Range Status   12/21/2022 107 98 - 107 mmol/L Final   07/10/2020 107 98 - 107 mmol/L Final     Carbon Dioxide   Date Value Ref Range Status   07/10/2020 25 21 - 31 mmol/L Final     Carbon Dioxide (CO2)   Date Value Ref Range Status   12/21/2022 25 22 - 29 mmol/L Final     Anion Gap   Date Value Ref Range Status   12/21/2022 9 7 - 15 mmol/L Final   07/10/2020 10 3 - 14 mmol/L Final     Glucose   Date Value Ref Range Status   12/21/2022 139 (H) 70 - 99 mg/dL Final   07/10/2020 77 70 - 105 mg/dL Final     Urea Nitrogen   Date Value Ref Range Status   12/21/2022 18.2 8.0 - 23.0 mg/dL Final   07/10/2020 23 7 - 25 mg/dL Final     Creatinine   Date Value Ref Range Status   12/21/2022 0.86 0.67 - 1.17 mg/dL Final   07/10/2020 1.26 0.70 - 1.30 mg/dL Final     GFR Estimate   Date Value Ref Range Status   12/21/2022 >90 >60 mL/min/1.73m2 Final     Comment:     Effective December 21, 2021 eGFRcr in adults is calculated using the 2021 CKD-EPI creatinine equation which includes age and gender (Taya et al., NEJM, DOI: 10.1056/WYDUcw0931429)   07/10/2020 57 (L) >60 mL/min/[1.73_m2] Final     Calcium   Date Value Ref Range Status   12/21/2022 9.2 8.8 - 10.2 mg/dL Final   07/10/2020 9.4 8.6 - 10.3 mg/dL Final     Bilirubin Total   Date Value Ref Range Status   12/21/2022 0.4 <=1.2 mg/dL Final     Alkaline Phosphatase   Date Value Ref Range Status   12/21/2022 161 (H) 40 - 129 U/L Final     ALT   Date Value Ref Range Status   12/21/2022 48 10 - 50 U/L Final     AST   Date Value Ref Range Status   12/21/2022 53 (H) 10 - 50 U/L Final     Comment:     Specimen  is hemolyzed which can falsely elevate AST. Analysis of a non-hemolyzed specimen may result in a lower value.   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 Atorvastatin, apixaban  Follow up with neurology and PCP as scheduled    (I10) Benign essential hypertension  Comment: stable  Plan: cont lisinipril, atorvastatin    (N40.0) Benign prostatic hyperplasia without urinary obstruction  Comment: stable  Plan: off proscar now and voiding without concerns    (N18.2) Chronic kidney disease, stage 2 (mild)  Comment: stable  Plan: cont to monitor and avoid nephrotoxic meds    PCP may address further need for omeprazole as this was started while hospitalized.     A total of 124 minutes was spent with this patient, of which more than 50% was spent in counseling and/or coordination of care.     SHIMA Celaya, CNP ....................  1/30/2023   9:16 AM         Documentation of Face-to-Face and Certification for Home Health Services     Patient: Ammon Harding   YOB: 1950  MR Number: [id number]  Today's Date: 1/25/2023    I certify that patient: Ammon Harding is under my care and that I, or a nurse practitioner or physician's assistant working with me, had a face-to-face encounter that meets the physician face-to-face encounter requirements with this patient on: 1/25/23.    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    My clinical findings support the need for the above services because: Occupational Therapy Services are needed to assess and treat cognitive ability and address ADL safety due to impairment in Left hemiparesis s/p emolic stroke. and Physical Therapy Services are needed to assess and treat the following functional impairments: left hemiparesis s/p embolic stroke.    Further, I certify that my clinical findings support that this patient is homebound (i.e. absences from home require considerable and taxing effort and are for medical reasons or [employer] services or infrequently or of short duration when for other reasons) because: Requires assistance of another person or specialized equipment to access medical services because patient: Requires supervision of another for safe transfer...    Based on the above findings. I certify that this patient is confined to the home and needs intermittent skilled nursing care, physical therapy and/or speech therapy.  The patient is under my care, and I have initiated the establishment of the plan of care.  This patient will be followed by a physician who will periodically review the plan of care.  Physician/Provider to provide follow up care: Chemo Ramos APRN CNP on 1/30/2023 at 9:16 AM

## 2023-01-31 NOTE — TELEPHONE ENCOUNTER
Called [facility] and verified name and date of birth of patient. Notified of verbal ok and no further questions.  Mamta Barron RN on 1/31/2023 at 9:12 AM

## 2023-02-03 DIAGNOSIS — R73.03 PRE-DIABETES: Primary | ICD-10-CM

## 2023-02-06 ENCOUNTER — HOSPITAL ENCOUNTER (OUTPATIENT)
Dept: CT IMAGING | Facility: OTHER | Age: 73
Discharge: HOME | End: 2023-02-06
Payer: MEDICARE

## 2023-02-06 ENCOUNTER — LAB (OUTPATIENT)
Dept: LAB | Facility: OTHER | Age: 73
End: 2023-02-06
Attending: INTERNAL MEDICINE
Payer: MEDICARE

## 2023-02-06 DIAGNOSIS — I63.12 ACUTE STROKE DUE TO EMBOLISM OF BASILAR ARTERY (H): ICD-10-CM

## 2023-02-06 DIAGNOSIS — R73.03 PRE-DIABETES: ICD-10-CM

## 2023-02-06 LAB
CREAT SERPL-MCNC: 1.08 MG/DL (ref 0.67–1.17)
GFR SERPL CREATININE-BSD FRML MDRD: 72 ML/MIN/1.73M2
HOLD SPECIMEN: NORMAL

## 2023-02-06 PROCEDURE — 76377 3D RENDER W/INTRP POSTPROCES: CPT

## 2023-02-06 PROCEDURE — 250N000011 HC RX IP 250 OP 636

## 2023-02-06 PROCEDURE — 82565 ASSAY OF CREATININE: CPT | Mod: ZL

## 2023-02-06 PROCEDURE — 36415 COLL VENOUS BLD VENIPUNCTURE: CPT | Mod: ZL

## 2023-02-06 PROCEDURE — 70498 CT ANGIOGRAPHY NECK: CPT

## 2023-02-06 RX ORDER — IOPAMIDOL 755 MG/ML
75 INJECTION, SOLUTION INTRAVASCULAR ONCE
Status: COMPLETED | OUTPATIENT
Start: 2023-02-06 | End: 2023-02-06

## 2023-02-06 RX ADMIN — IOPAMIDOL 75 ML: 755 INJECTION, SOLUTION INTRAVENOUS at 09:22

## 2023-02-06 NOTE — PROGRESS NOTES
1.  Has the patient had a previous reaction to IV contrast? no    2.  Does the patient have kidney disease? Yes - CKD2 - GFR 72    3.  Is the patient on dialysis? no    If YES to any of these questions, exam will be reviewed with a Radiologist before administering contrast.    IV Contrast- Discharge Instructions After Your CT Scan      The IV contrast you received today will be filtered from your bloodstream by your kidneys during the next 24 hours and pass from the body in urine.  You will not be aware of this process and your urine will not change in color.  To help this process you should drink at least 4 additional glasses of water or juice today.  This reduces stress on your kidneys.    Most contrast reactions are immediate.  Should you develop symptoms of concern after discharge, contact the department at the number below.  After hours you should contact your personal physician.  If you develop breathing distress or wheezing, call 911.

## 2023-02-07 DIAGNOSIS — I69.322 DYSARTHRIA AS LATE EFFECT OF CEREBELLAR CEREBROVASCULAR ACCIDENT (CVA): Primary | ICD-10-CM

## 2023-02-07 NOTE — PROGRESS NOTES
Dr Ramos reviewed and completed the following home care or hospice form(s) for Ammon on 2/7/23.     This covers the certification period effective 1/30/23 to 3/30/23.  Mamta Barron RN on 2/7/2023 at 1:20 PM

## 2023-02-08 ENCOUNTER — OFFICE VISIT (OUTPATIENT)
Dept: INTERNAL MEDICINE | Facility: OTHER | Age: 73
End: 2023-02-08
Attending: INTERNAL MEDICINE
Payer: MEDICARE

## 2023-02-08 VITALS
BODY MASS INDEX: 27.25 KG/M2 | OXYGEN SATURATION: 97 % | HEART RATE: 80 BPM | TEMPERATURE: 97 F | RESPIRATION RATE: 18 BRPM | DIASTOLIC BLOOD PRESSURE: 70 MMHG | SYSTOLIC BLOOD PRESSURE: 132 MMHG | WEIGHT: 174 LBS

## 2023-02-08 DIAGNOSIS — I63.12 ACUTE STROKE DUE TO EMBOLISM OF BASILAR ARTERY (H): ICD-10-CM

## 2023-02-08 DIAGNOSIS — R13.12 OROPHARYNGEAL DYSPHAGIA: ICD-10-CM

## 2023-02-08 DIAGNOSIS — I10 BENIGN ESSENTIAL HYPERTENSION: Primary | ICD-10-CM

## 2023-02-08 DIAGNOSIS — N40.0 BENIGN PROSTATIC HYPERPLASIA WITHOUT URINARY OBSTRUCTION: ICD-10-CM

## 2023-02-08 PROCEDURE — G0463 HOSPITAL OUTPT CLINIC VISIT: HCPCS | Performed by: INTERNAL MEDICINE

## 2023-02-08 PROCEDURE — 99214 OFFICE O/P EST MOD 30 MIN: CPT | Performed by: INTERNAL MEDICINE

## 2023-02-08 RX ORDER — LISINOPRIL 10 MG/1
10 TABLET ORAL DAILY
Qty: 90 TABLET | Refills: 3 | Status: SHIPPED | OUTPATIENT
Start: 2023-02-08 | End: 2023-06-16

## 2023-02-08 RX ORDER — FINASTERIDE 5 MG/1
5 TABLET, FILM COATED ORAL DAILY
COMMUNITY
Start: 2023-02-08 | End: 2023-02-08

## 2023-02-08 RX ORDER — FINASTERIDE 5 MG/1
5 TABLET, FILM COATED ORAL DAILY
Qty: 90 TABLET | Refills: 3 | Status: SHIPPED | OUTPATIENT
Start: 2023-02-08 | End: 2023-06-16

## 2023-02-08 RX ORDER — ATORVASTATIN CALCIUM 80 MG/1
80 TABLET, FILM COATED ORAL DAILY
Qty: 90 TABLET | Refills: 3 | Status: SHIPPED | OUTPATIENT
Start: 2023-02-08 | End: 2023-06-16

## 2023-02-08 ASSESSMENT — PAIN SCALES - GENERAL: PAINLEVEL: NO PAIN (0)

## 2023-02-08 ASSESSMENT — ENCOUNTER SYMPTOMS
CONSTITUTIONAL NEGATIVE: 1
CARDIOVASCULAR NEGATIVE: 1
RESPIRATORY NEGATIVE: 1

## 2023-02-08 NOTE — PROGRESS NOTES
ICD-10-CM    1. Benign essential hypertension  I10 atorvastatin (LIPITOR) 80 MG tablet     lisinopril (ZESTRIL) 10 MG tablet      2. Acute stroke due to embolism of basilar artery (H)  I63.12 apixaban ANTICOAGULANT (ELIQUIS) 5 MG tablet     atorvastatin (LIPITOR) 80 MG tablet      3. Benign prostatic hyperplasia without urinary obstruction  N40.0 finasteride (PROSCAR) 5 MG tablet      4. Oropharyngeal dysphagia  R13.12 omeprazole (PRILOSEC) 20 MG DR capsule        Overall he has definitely had some improvement in it sounds as though he is functioning adequately at home with assistance.  He will continue with physical therapy and Occupational Therapy.  He will follow-up with surgery on the 17th regarding his CT scan findings.  No further testing was indicated today.  He will follow-up with me as needed going forward.      Ayla Verde is a 72 year old accompanied by his spouse, presenting for the following health issues:  Follow Up      He returns for follow-up.  He has now been at home for a week and a half.  He of course had a stroke which left him with left-sided hemiparesis, most notably in his upper extremity.  He has gained strength in his left lower extremity and is now able to walk with a walker.  He is back at home and is getting physical therapy and Occupational Therapy twice weekly.  His wife is staying with him during the day, daughter is staying with him at night.  The wife and the daughter do not get along, this is the reason that she does not stay there at night.  He is progressing and is determined to improve upon his functional abilities.  They would like to get a handicap parking permit which seems reasonable, he is currently not driving of course.  In addition, they [facility] be [facility] as the anticoagulation especially is quite expensive.    History of Present Illness       Reason for visit:  Stroke folow up    He eats 2-3 servings  of fruits and vegetables daily.He consumes 1 sweetened beverage(s) daily.He exercises with enough effort to increase his heart rate 9 or less minutes per day.  He exercises with enough effort to increase his heart rate 4 days per week.   He is taking medications regularly.         Current Outpatient Medications   Medication     acetaminophen (TYLENOL) 325 MG tablet     apixaban ANTICOAGULANT (ELIQUIS) 5 MG tablet     atorvastatin (LIPITOR) 80 MG tablet     finasteride (PROSCAR) 5 MG tablet     lisinopril (ZESTRIL) 10 MG tablet     omeprazole (PRILOSEC) 20 MG DR capsule     Prenatal Vit-Fe Fumarate-FA (PREPLUS) 27-1 MG TABS     SENNA-docusate sodium (SENNA S) 8.6-50 MG tablet     No current facility-administered medications for this visit.         Review of Systems   Constitutional: Negative.    Respiratory: Negative.    Cardiovascular: Negative.             Objective    /70   Pulse 80   Temp 97  F (36.1  C) (Temporal)   Resp 18   Wt 78.9 kg (174 lb)   SpO2 97%   BMI 27.25 kg/m    Body mass index is 27.25 kg/m .  Physical Exam  Vitals and nursing note reviewed.   Constitutional:       General: He is not in acute distress.     Appearance: Normal appearance. He is not ill-appearing, toxic-appearing or diaphoretic.   Neurological:      Mental Status: He is alert.      Comments: Left lower extremity strength is 4+ out of 5.  Left upper extremity is essentially nonfunctional.  Speech is slightly impaired but definitely improved.

## 2023-02-14 ENCOUNTER — HOSPITAL ENCOUNTER (EMERGENCY)
Facility: OTHER | Age: 73
Discharge: HOME | End: 2023-02-14
Attending: STUDENT IN AN ORGANIZED HEALTH CARE EDUCATION/TRAINING PROGRAM | Admitting: STUDENT IN AN ORGANIZED HEALTH CARE EDUCATION/TRAINING PROGRAM
Payer: MEDICARE

## 2023-02-14 ENCOUNTER — APPOINTMENT (OUTPATIENT)
Dept: MRI IMAGING | Facility: OTHER | Age: 73
End: 2023-02-14
Attending: STUDENT IN AN ORGANIZED HEALTH CARE EDUCATION/TRAINING PROGRAM
Payer: MEDICARE

## 2023-02-14 ENCOUNTER — APPOINTMENT (OUTPATIENT)
Dept: CT IMAGING | Facility: OTHER | Age: 73
End: 2023-02-14
Attending: STUDENT IN AN ORGANIZED HEALTH CARE EDUCATION/TRAINING PROGRAM
Payer: MEDICARE

## 2023-02-14 VITALS
RESPIRATION RATE: 8 BRPM | HEIGHT: 67 IN | TEMPERATURE: 98.8 F | BODY MASS INDEX: 27.31 KG/M2 | OXYGEN SATURATION: 98 % | DIASTOLIC BLOOD PRESSURE: 94 MMHG | HEART RATE: 69 BPM | SYSTOLIC BLOOD PRESSURE: 154 MMHG | WEIGHT: 174 LBS

## 2023-02-14 DIAGNOSIS — N30.01 ACUTE CYSTITIS WITH HEMATURIA: ICD-10-CM

## 2023-02-14 LAB
ALBUMIN UR-MCNC: 30 MG/DL
ANION GAP SERPL CALCULATED.3IONS-SCNC: 7 MMOL/L (ref 7–15)
APPEARANCE UR: ABNORMAL
APTT PPP: 24 SECONDS (ref 22–38)
BACTERIA #/AREA URNS HPF: ABNORMAL /HPF
BASOPHILS # BLD AUTO: 0 10E3/UL (ref 0–0.2)
BASOPHILS NFR BLD AUTO: 0 %
BILIRUB UR QL STRIP: NEGATIVE
BUN SERPL-MCNC: 23.2 MG/DL (ref 8–23)
CALCIUM SERPL-MCNC: 10 MG/DL (ref 8.8–10.2)
CHLORIDE SERPL-SCNC: 106 MMOL/L (ref 98–107)
COLOR UR AUTO: YELLOW
CREAT SERPL-MCNC: 1.05 MG/DL (ref 0.67–1.17)
DEPRECATED HCO3 PLAS-SCNC: 30 MMOL/L (ref 22–29)
EOSINOPHIL # BLD AUTO: 0.1 10E3/UL (ref 0–0.7)
EOSINOPHIL NFR BLD AUTO: 1 %
ERYTHROCYTE [DISTWIDTH] IN BLOOD BY AUTOMATED COUNT: 12.1 % (ref 10–15)
GFR SERPL CREATININE-BSD FRML MDRD: 75 ML/MIN/1.73M2
GLUCOSE BLDC GLUCOMTR-MCNC: 121 MG/DL (ref 70–99)
GLUCOSE SERPL-MCNC: 128 MG/DL (ref 70–99)
GLUCOSE UR STRIP-MCNC: NEGATIVE MG/DL
HCT VFR BLD AUTO: 46.7 % (ref 40–53)
HGB BLD-MCNC: 15.6 G/DL (ref 13.3–17.7)
HGB UR QL STRIP: ABNORMAL
HOLD SPECIMEN: NORMAL
IMM GRANULOCYTES # BLD: 0 10E3/UL
IMM GRANULOCYTES NFR BLD: 0 %
INR PPP: 1.13 (ref 0.85–1.15)
KETONES UR STRIP-MCNC: NEGATIVE MG/DL
LEUKOCYTE ESTERASE UR QL STRIP: ABNORMAL
LYMPHOCYTES # BLD AUTO: 1.3 10E3/UL (ref 0.8–5.3)
LYMPHOCYTES NFR BLD AUTO: 11 %
MCH RBC QN AUTO: 31 PG (ref 26.5–33)
MCHC RBC AUTO-ENTMCNC: 33.4 G/DL (ref 31.5–36.5)
MCV RBC AUTO: 93 FL (ref 78–100)
MONOCYTES # BLD AUTO: 0.6 10E3/UL (ref 0–1.3)
MONOCYTES NFR BLD AUTO: 5 %
MUCOUS THREADS #/AREA URNS LPF: PRESENT /LPF
NEUTROPHILS # BLD AUTO: 9.8 10E3/UL (ref 1.6–8.3)
NEUTROPHILS NFR BLD AUTO: 83 %
NITRATE UR QL: POSITIVE
NRBC # BLD AUTO: 0 10E3/UL
NRBC BLD AUTO-RTO: 0 /100
PH UR STRIP: 6.5 [PH] (ref 5–9)
PLATELET # BLD AUTO: 247 10E3/UL (ref 150–450)
POTASSIUM SERPL-SCNC: 4.3 MMOL/L (ref 3.4–5.3)
RBC # BLD AUTO: 5.04 10E6/UL (ref 4.4–5.9)
RBC URINE: 33 /HPF
RENAL TUB EPI: 2 /HPF
SODIUM SERPL-SCNC: 143 MMOL/L (ref 136–145)
SP GR UR STRIP: >1.04 (ref 1–1.03)
TROPONIN T SERPL HS-MCNC: 14 NG/L
UROBILINOGEN UR STRIP-MCNC: NORMAL MG/DL
WBC # BLD AUTO: 11.9 10E3/UL (ref 4–11)
WBC CLUMPS #/AREA URNS HPF: PRESENT /HPF
WBC URINE: >182 /HPF

## 2023-02-14 PROCEDURE — 250N000011 HC RX IP 250 OP 636: Performed by: STUDENT IN AN ORGANIZED HEALTH CARE EDUCATION/TRAINING PROGRAM

## 2023-02-14 PROCEDURE — 99284 EMERGENCY DEPT VISIT MOD MDM: CPT | Performed by: STUDENT IN AN ORGANIZED HEALTH CARE EDUCATION/TRAINING PROGRAM

## 2023-02-14 PROCEDURE — 82310 ASSAY OF CALCIUM: CPT | Performed by: STUDENT IN AN ORGANIZED HEALTH CARE EDUCATION/TRAINING PROGRAM

## 2023-02-14 PROCEDURE — A9575 INJ GADOTERATE MEGLUMI 0.1ML: HCPCS | Performed by: STUDENT IN AN ORGANIZED HEALTH CARE EDUCATION/TRAINING PROGRAM

## 2023-02-14 PROCEDURE — 93010 ELECTROCARDIOGRAM REPORT: CPT | Performed by: INTERNAL MEDICINE

## 2023-02-14 PROCEDURE — 70496 CT ANGIOGRAPHY HEAD: CPT | Mod: MA

## 2023-02-14 PROCEDURE — 84484 ASSAY OF TROPONIN QUANT: CPT | Performed by: STUDENT IN AN ORGANIZED HEALTH CARE EDUCATION/TRAINING PROGRAM

## 2023-02-14 PROCEDURE — 81003 URINALYSIS AUTO W/O SCOPE: CPT | Performed by: STUDENT IN AN ORGANIZED HEALTH CARE EDUCATION/TRAINING PROGRAM

## 2023-02-14 PROCEDURE — 93005 ELECTROCARDIOGRAM TRACING: CPT | Performed by: STUDENT IN AN ORGANIZED HEALTH CARE EDUCATION/TRAINING PROGRAM

## 2023-02-14 PROCEDURE — 96372 THER/PROPH/DIAG INJ SC/IM: CPT | Performed by: STUDENT IN AN ORGANIZED HEALTH CARE EDUCATION/TRAINING PROGRAM

## 2023-02-14 PROCEDURE — G1010 CDSM STANSON: HCPCS

## 2023-02-14 PROCEDURE — 99207 PR NO CHARGE LOS: CPT | Performed by: PHYSICIAN ASSISTANT

## 2023-02-14 PROCEDURE — 85730 THROMBOPLASTIN TIME PARTIAL: CPT | Performed by: STUDENT IN AN ORGANIZED HEALTH CARE EDUCATION/TRAINING PROGRAM

## 2023-02-14 PROCEDURE — 70553 MRI BRAIN STEM W/O & W/DYE: CPT | Mod: MG

## 2023-02-14 PROCEDURE — 70450 CT HEAD/BRAIN W/O DYE: CPT | Mod: MA

## 2023-02-14 PROCEDURE — 99285 EMERGENCY DEPT VISIT HI MDM: CPT | Mod: 25 | Performed by: STUDENT IN AN ORGANIZED HEALTH CARE EDUCATION/TRAINING PROGRAM

## 2023-02-14 PROCEDURE — 85025 COMPLETE CBC W/AUTO DIFF WBC: CPT | Performed by: STUDENT IN AN ORGANIZED HEALTH CARE EDUCATION/TRAINING PROGRAM

## 2023-02-14 PROCEDURE — 36415 COLL VENOUS BLD VENIPUNCTURE: CPT | Performed by: STUDENT IN AN ORGANIZED HEALTH CARE EDUCATION/TRAINING PROGRAM

## 2023-02-14 PROCEDURE — 255N000002 HC RX 255 OP 636: Performed by: STUDENT IN AN ORGANIZED HEALTH CARE EDUCATION/TRAINING PROGRAM

## 2023-02-14 PROCEDURE — 85610 PROTHROMBIN TIME: CPT | Performed by: STUDENT IN AN ORGANIZED HEALTH CARE EDUCATION/TRAINING PROGRAM

## 2023-02-14 PROCEDURE — 70498 CT ANGIOGRAPHY NECK: CPT | Mod: MA

## 2023-02-14 PROCEDURE — 82962 GLUCOSE BLOOD TEST: CPT

## 2023-02-14 PROCEDURE — 87086 URINE CULTURE/COLONY COUNT: CPT | Performed by: STUDENT IN AN ORGANIZED HEALTH CARE EDUCATION/TRAINING PROGRAM

## 2023-02-14 RX ORDER — SODIUM CHLORIDE 9 MG/ML
INJECTION, SOLUTION INTRAVENOUS CONTINUOUS PRN
Status: DISCONTINUED | OUTPATIENT
Start: 2023-02-14 | End: 2023-02-14 | Stop reason: HOSPADM

## 2023-02-14 RX ORDER — LABETALOL HYDROCHLORIDE 5 MG/ML
10 INJECTION, SOLUTION INTRAVENOUS EVERY 10 MIN PRN
Status: DISCONTINUED | OUTPATIENT
Start: 2023-02-14 | End: 2023-02-14 | Stop reason: HOSPADM

## 2023-02-14 RX ORDER — GAUZE BANDAGE 2" X 2"
1 BANDAGE TOPICAL
COMMUNITY
Start: 2022-12-13 | End: 2023-06-16

## 2023-02-14 RX ORDER — ACETAMINOPHEN 325 MG/1
325-650 TABLET ORAL
COMMUNITY
Start: 2022-12-13 | End: 2023-06-16

## 2023-02-14 RX ORDER — CEFDINIR 300 MG/1
300 CAPSULE ORAL 2 TIMES DAILY
Qty: 12 CAPSULE | Refills: 0 | Status: SHIPPED | OUTPATIENT
Start: 2023-02-14 | End: 2023-02-20

## 2023-02-14 RX ORDER — CEFTRIAXONE SODIUM 1 G
1 VIAL (EA) INJECTION ONCE
Status: COMPLETED | OUTPATIENT
Start: 2023-02-14 | End: 2023-02-14

## 2023-02-14 RX ORDER — ATORVASTATIN CALCIUM 80 MG/1
1 TABLET, FILM COATED ORAL EVERY EVENING
COMMUNITY
Start: 2022-12-13 | End: 2023-06-16

## 2023-02-14 RX ORDER — LISINOPRIL 10 MG/1
1 TABLET ORAL DAILY
COMMUNITY
Start: 2022-12-14 | End: 2023-06-16

## 2023-02-14 RX ORDER — IOPAMIDOL 755 MG/ML
75 INJECTION, SOLUTION INTRAVASCULAR ONCE
Status: COMPLETED | OUTPATIENT
Start: 2023-02-14 | End: 2023-02-14

## 2023-02-14 RX ORDER — HYDRALAZINE HYDROCHLORIDE 20 MG/ML
10 INJECTION INTRAMUSCULAR; INTRAVENOUS EVERY 10 MIN PRN
Status: DISCONTINUED | OUTPATIENT
Start: 2023-02-14 | End: 2023-02-14 | Stop reason: HOSPADM

## 2023-02-14 RX ADMIN — IOPAMIDOL 75 ML: 755 INJECTION, SOLUTION INTRAVENOUS at 10:04

## 2023-02-14 RX ADMIN — GADOTERATE MEGLUMINE 16 ML: 376.9 INJECTION INTRAVENOUS at 11:41

## 2023-02-14 RX ADMIN — CEFTRIAXONE SODIUM 1 G: 1 INJECTION, POWDER, FOR SOLUTION INTRAMUSCULAR; INTRAVENOUS at 13:20

## 2023-02-14 ASSESSMENT — ACTIVITIES OF DAILY LIVING (ADL)
ADLS_ACUITY_SCORE: 35
ADLS_ACUITY_SCORE: 35

## 2023-02-14 ASSESSMENT — VISUAL ACUITY: OU: BASELINE

## 2023-02-14 NOTE — ED PROVIDER NOTES
History     Chief Complaint   Patient presents with     Stroke Symptoms       Ammon Harding is a 72 year old old male presenting with slurred speech and worsening left facial droop. Onset 2 hours ago at 7:30 AM.  Onset of dizziness and diaphoresis prior to having a bowel movement which seemed to resolve afterwards, with these symptoms being consistent with his past stroke November 2022 for which she has left upper extremity paresis and left lower extremity weakness and left facial droop.  His wife notes that his left facial droop is worse today and he has slurred speech. Denies headache, difficulty with word finding or formation, vision change, fever, chills, nausea, vomiting, chest or other pain, dyspnea, new weakness, numbness,  discoordination, confusion. Is on anticoagulants and compliant.    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) 12/15/2022     Priority: Medium     Noted per [facility] (Basilar artery embolism) a 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          Oropharyngeal dysphagia 12/15/2022     Priority: Medium     Chronic left shoulder pain 12/15/2022     Priority: Medium     Since CVA       H/O adenomatous polyp of colon 03/16/2021     Priority: Medium     Benign prostatic hyperplasia without urinary  obstruction 03/16/2020     Priority: Medium     Prostate nodule 02/17/2020     Priority: Medium     Chronic kidney disease, stage 2 (mild) 10/16/2019     Priority: Medium     Other hyperlipidemia 10/16/2019     Priority: Medium     Osteoarthritis 06/25/2019     Priority: Medium     Deviated nasal septum 06/25/2019     Priority: Medium     Benign essential hypertension 07/30/2018     Priority: Medium     Elevated PSA 02/16/2018     Priority: Medium     Overview:   psa 5.1       Digital mucous cyst of toe of right foot 02/16/2018     Priority: Medium     Overview:   left foot       History of prostate biopsy 10/18/2017     Priority: Medium     Tinnitus 08/31/2015     Priority: Medium     Low back pain with sciatica 01/08/2014     Priority: Medium       Past Medical History:   Diagnosis Date     Acute cerebrovascular accident (CVA) due to embolism of basilar artery (H) 11/2022     Elevated prostate specific antigen (PSA)      Hyperlipidemia      Hypertension      Osteoarthritis        Past Surgical History:   Procedure Laterality Date     ARTHROPLASTY KNEE Right 2017     ARTHROSCOPY KNEE Left 2012     COLONOSCOPY  01/01/2016 [place]     COLONOSCOPY N/A 03/22/2021    F/U 2026 tubular adenomas     PROSTATE BIOPSY, NEEDLE, SATURATION SAMPLING  2017     PROSTATE BIOPSY, NEEDLE, SATURATION SAMPLING  05/2020    Benign     TONSILLECTOMY      No Comments Provided     VASECTOMY      No Comments Provided       Family History   Problem Relation Age of Onset     Heart Failure Father      Dementia Father      Other - See Comments Mother         Dementia     Diabetes Brother      Heart Disease Other         Heart Disease,In distant relatives       Social History     Tobacco Use     Smoking status: Former     Types: Pipe     Smokeless tobacco: Never   Substance Use Topics     Alcohol use: Yes     Comment: 14 a week      Drug use: No     Comment: Drug use: No       Medications:    acetaminophen (TYLENOL) 325 MG  "tablet  acetaminophen (TYLENOL) 325 MG tablet  apixaban ANTICOAGULANT (ELIQUIS) 5 MG tablet  atorvastatin (LIPITOR) 80 MG tablet  atorvastatin (LIPITOR) 80 MG tablet  cefdinir (OMNICEF) 300 MG capsule  finasteride (PROSCAR) 5 MG tablet  lisinopril (ZESTRIL) 10 MG tablet  lisinopril (ZESTRIL) 10 MG tablet  omeprazole (PRILOSEC) 20 MG DR capsule  omeprazole (PRILOSEC) 20 MG DR capsule  Prenatal Vit-Fe Fumarate-FA (PREPLUS) 27-1 MG TABS  SENNA-docusate sodium (SENNA S) 8.6-50 MG tablet  vitamin B1 (THIAMINE) 100 MG tablet        Review of Systems: see HPI for pertinent positives and negatives.     Physical Exam   BP (!) 154/94   Pulse 69   Temp 98.8  F (37.1  C) (Tympanic)   Resp (!) 8   Ht 1.702 m (5' 7\")   Wt 78.9 kg (174 lb)   SpO2 98%   BMI 27.25 kg/m       General: awake and alert, comfortable  HEENT: atraumatic, no scleral injection, no nasal discharge, neck supple  Respiratory: clear to auscultation bilaterally, normal effort, symmetric chest rise  Cardiovascular: regular rate and irregular rhythm, no murmurs  Abdomen: soft, nontender, nondistended  Extremities: no deformities, edema, or tenderness  Skin: warm, dry, no rashes  Neuro: GCS15    National Institutes of Health Stroke Scale  Exam Interval: Baseline   Score    Level of consciousness: (0)   Alert, keenly responsive    LOC questions: (0)   Answers both questions correctly    LOC commands: (0)   Performs both tasks correctly    Best gaze: (0)   Normal    Visual: (0)   No visual loss    Facial palsy: (2)   Partial paralysis (total/near total of lower face)    Motor arm (left): (4)   No movement    Motor arm (right): (0)   No drift    Motor leg (left): (1)   Drift    Motor leg (right): (0)   No drift    Limb ataxia: (1)   Present in one limb    Sensory: (0)   Normal- no sensory loss    Best language: (0)   Normal- no aphasia    Dysarthria: (1)   Mild to moderate dysarthria    Extinction and inattention: (0)   No abnormality        Total Score:  11 " (new deficits appear to be score 2 for dysarthria and worsening facial paralysis)       ED Course     ED Course as of 02/14/23 1409   Tue Feb 14, 2023   1005 No obvious new CT findings. Recommend MR per radiology. Will de-escalate and order MR.   1012 EKG: Sinus rhythm with occasional PVC.  Nonischemic appearing with no ST deviation or abnormal T wave inversion or new LBBB.       Procedures      The patient has stroke symptoms:         ED Stroke specific documentation           NIHSS PDF     Patient last known well time: 0730  ED Provider first to bedside at: 0950  CT Results received at: 1005    tPA: was not given  no indication    If treating with tPA: Ensure SBP<185 and DBP<105 prior to treatment with IV tPA.  Administering IV tPA after treatment with IV labetalol, hydralazine, or nicardipine is reasonable once BP control is established.    Endovascular Retrieval: not available at this site    National Institutes of Health Stroke Scale (Baseline)  Time Performed: 0950 (see physical exam details above)    Stroke Mimics were considered (including migraine headache, seizure disorder, hypoglycemia (or hyperglycemia), head or spinal trauma, CNS infection, Toxin ingestion and shock state (e.g. sepsis) .       Results for orders placed or performed during the hospital encounter of 02/14/23 (from the past 24 hour(s))   Glucose by meter   Result Value Ref Range    GLUCOSE BY METER POCT 121 (H) 70 - 99 mg/dL   CBC with Platelets & Differential    Narrative    The following orders were created for panel order CBC with Platelets & Differential.  Procedure                               Abnormality         Status                     ---------                               -----------         ------                     CBC with platelets and d...[[id number]]  Abnormal            Final result                 Please view results for these tests on the individual orders.   Basic metabolic panel   Result Value Ref Range    Sodium  143 136 - 145 mmol/L    Potassium 4.3 3.4 - 5.3 mmol/L    Chloride 106 98 - 107 mmol/L    Carbon Dioxide (CO2) 30 (H) 22 - 29 mmol/L    Anion Gap 7 7 - 15 mmol/L    Urea Nitrogen 23.2 (H) 8.0 - 23.0 mg/dL    Creatinine 1.05 0.67 - 1.17 mg/dL    Calcium 10.0 8.8 - 10.2 mg/dL    Glucose 128 (H) 70 - 99 mg/dL    GFR Estimate 75 >60 mL/min/1.73m2   INR   Result Value Ref Range    INR 1.13 0.85 - 1.15   Partial thromboplastin time   Result Value Ref Range    aPTT 24 22 - 38 Seconds   Troponin T, High Sensitivity   Result Value Ref Range    Troponin T, High Sensitivity 14 <=22 ng/L   CBC with platelets and differential   Result Value Ref Range    WBC Count 11.9 (H) 4.0 - 11.0 10e3/uL    RBC Count 5.04 4.40 - 5.90 10e6/uL    Hemoglobin 15.6 13.3 - 17.7 g/dL    Hematocrit 46.7 40.0 - 53.0 %    MCV 93 78 - 100 fL    MCH 31.0 26.5 - 33.0 pg    MCHC 33.4 31.5 - 36.5 g/dL    RDW 12.1 10.0 - 15.0 %    Platelet Count 247 150 - 450 10e3/uL    % Neutrophils 83 %    % Lymphocytes 11 %    % Monocytes 5 %    % Eosinophils 1 %    % Basophils 0 %    % Immature Granulocytes 0 %    NRBCs per 100 WBC 0 <1 /100    Absolute Neutrophils 9.8 (H) 1.6 - 8.3 10e3/uL    Absolute Lymphocytes 1.3 0.8 - 5.3 10e3/uL    Absolute Monocytes 0.6 0.0 - 1.3 10e3/uL    Absolute Eosinophils 0.1 0.0 - 0.7 10e3/uL    Absolute Basophils 0.0 0.0 - 0.2 10e3/uL    Absolute Immature Granulocytes 0.0 <=0.4 10e3/uL    Absolute NRBCs 0.0 10e3/uL   Extra Tube ([place] Draw)    Narrative    The following orders were created for panel order Extra Tube ([place] Draw).  Procedure                               Abnormality         Status                     ---------                               -----------         ------                     Extra Red Top Tube[[id number]]                               Final result                 Please view results for these tests on the individual orders.   Extra Red Top Tube   Result Value Ref Range    Hold Specimen JIC    CT Head w/o  Contrast    Narrative    PROCEDURE: CT HEAD W/O CONTRAST     HISTORY: slurred speech, worsening left facial droop.    COMPARISON: 11/10/2022, 2/6/2023    TECHNIQUE:  Helical images of the head from the foramen magnum to the  vertex were obtained without contrast. This CT exam was performed  using one or more the following dose reduction techniques: automated  exposure control, adjustment of the mA and/or kV according to patient  size, and/or iterative reconstruction technique.    FINDINGS: Transcortical hypoattenuation in the left cerebellum is  chronic, compatible with a prior infarct. Hypoattenuation in the right  anterior napoleon is unchanged when compared to 2/6/2023, new when  compared to 11/10/2022. Assessment for new posterior fossa ischemia is  somewhat limited by streak artifact related to hyperostosis.    No hydrocephalus, mass effect, midline shift or basilar cistern  effacement is seen.    No calvarial fracture is present.  The visualized paranasal sinuses  are clear.      Impression    IMPRESSION: Chronic left cerebellar infarct. Subacute to chronic  anterior right pontine infarct. Slightly limited CT assessment for  recurrent posterior fossa ischemia due to streak artifact. Recommend  MR brain for further assessment.    Finding was identified on 2/14/2023 9:59 AM.     Dr. Lowe was contacted by me on 2/14/2023 10:02 AM and verbalized  understanding of the result.     ZINA LINDSEY MD         SYSTEM ID:  [id number]   CTA Head Neck with Contrast    Narrative    CT ANGIOGRAPHY OF THE BRAIN AND NECK    HISTORY: slurred speech, worsening left facial droop.    TECHNIQUE: Following the administration of intravenous contrast, thin  helical CT angiography images of the brain were obtained.   Postcontrast helical thin CT angiography images of the neck were  obtained.  NASCET criteria were applied. Source, multiplanar and MIP  reformatted images were reviewed.  This CT exam was performed using  one or more the  following dose reduction techniques: automated  exposure control, adjustment of the mA and/or kV according to patient  size, and/or iterative reconstruction technique.    COMPARISON: 2/6/2023    FINDINGS:    CTA Brain:      Atherosclerotic calcification is seen in the cavernous carotids. The  petrous, cavernous, and supraclinoid internal carotid arteries  demonstrate no high-grade, flow limiting stenosis.    The A1 segments are symmetric. An anterior communicating artery is  present. The distal LORENA vessels are unremarkable. The M1 segments, MCA  bifurcations and distal MCA vessels are patent. Bilateral posterior  communicating arteries are seen.    The left vertebral artery terminates in left posterior inferior  cerebellar artery. The post PICA left V4 segment is again not seen.  Enhancement of the basilar artery is stable. No focal SCA or PCA  abnormality is identified.    CTA Neck:     A 3 vessel aortic arch is present. The innominate and bilateral  subclavian arteries are grossly patent.    The common carotid arteries demonstrate preserved caliber. The carotid  bulbs demonstrate mild plaque without measurable stenosis. The  bilateral internal carotid arteries demonstrate no evidence of  flow-limiting stenosis or occlusion.    The proximal right vertebral artery remains occluded to the mid right  V2 segment, unchanged. The distal, dominant right vertebral artery is  relatively normal in enhancement. The left vertebral artery is again  nondominant.    No mass or pneumothorax is seen at the apices. A chronic left shoulder  effusion containing an ossific body is noted. Multilevel degenerative  changes are seen in the cervical spine.      Impression    IMPRESSION:    Persistent occlusion of the right V1 and proximal V2 segments of the  dominant right vertebral artery. Nonvisualization, likely due to  hypoplasia, of the post PICA left V4 segment. Bilateral posterior  commuting arteries are again present.    Unchanged  basilar enhancement.    Unchanged anterior circulation.    ZINA LINDSEY MD         SYSTEM ID:  [id number]   MR Brain w/o & w Contrast    Narrative    MR BRAIN W/O & W CONTRAST  2/14/2023 11:41 AM    History: Male, age 72 years, new slurred speech and worsening facial  droop    Comparison: CT scan of the brain 2/14/2023    Technique: Sagittal T1, axial T2, T2 FLAIR, diffusion, gradient echo,  ADC mapping, T1 and 3 plane T1 fat saturation postcontrast 3-D. .    Findings:   Ventricles and sulci: Normal in size and shape.    Gray and white matter: Focal area of encephalomalacia involving the  anterior, right para midline aspects of the napoleon    Cerebellopontine angles: Clear    Extra-axial spaces: Clear.    Enhancement: Normal.    Midline structures: Normal in contour.  Globes: Normal.  Orbits: Normal. Intraconal and extraconal fat unremarkable.  Extraocular muscles: Normal.  Paranasal sinuses: Normal.  Mastoid air cells: Normal.  Diffusion: Normal.      Impression    Impression:  No evidence of acute abnormality. No evidence of acute or subacute  ischemia. No apparent mass.    Focal area of encephalomalacia involving the anterior, right para  midline aspects of the napoleon likely related to an old infarct.      Localized encephalomalacia consistent with an old focal cortical  infarct involving the superior, medial aspect of the left cerebellar  hemisphere.    NIDIA SMITH MD         SYSTEM ID:  [id number]   UA with Microscopic reflex to Culture    Specimen: Urine, Clean Catch   Result Value Ref Range    Color Urine Yellow Colorless, Straw, Light Yellow, Yellow    Appearance Urine Cloudy (A) Clear    Glucose Urine Negative Negative mg/dL    Bilirubin Urine Negative Negative    Ketones Urine Negative Negative mg/dL    Specific Gravity Urine >1.040 (H) 1.003 - 1.035    Blood Urine Small (A) Negative    pH Urine 6.5 5.0 - 9.0    Protein Albumin Urine 30 (A) Negative mg/dL    Urobilinogen Urine Normal Normal, 2.0 mg/dL     Nitrite Urine Positive (A) Negative    Leukocyte Esterase Urine Large (A) Negative    Bacteria Urine Few (A) None Seen /HPF    WBC Clumps Urine Present (A) None Seen /HPF    Mucus Urine Present (A) None Seen /LPF    RBC Urine 33 (H) <=2 /HPF    WBC Urine >182 (H) <=5 /HPF    Renal Tubular Epithelials Urine 2 (H) None Seen /HPF    Narrative    Urine Culture ordered based on laboratory criteria       Medications   sodium chloride 0.9% infusion (has no administration in time range)   labetalol (NORMODYNE/TRANDATE) injection 10 mg (has no administration in time range)     Or   hydrALAZINE (APRESOLINE) injection 10 mg (has no administration in time range)   niCARdipine 40 mg in 200 mL NS (CARDENE) infusion (has no administration in time range)   iopamidol (ISOVUE-370) solution 75 mL (75 mLs Intravenous Given 2/14/23 1004)   gadoterate meglumine (DOTAREM) injection 20 mL (16 mLs Intravenous Given 2/14/23 1141)   cefTRIAXone (ROCEPHIN) in NS for IM administration 1 g (1 g Intramuscular Given 2/14/23 1320)       Assessments & Plan (with Medical Decision Making)     I have reviewed the nursing notes.    Patient evaluated for concern for stroke with slurred speech and worsening left facial droop.  Onset included dizziness and diaphoresis and was concerned this was similar to his recent stroke several months ago.  Per wife his left facial droop is worse than before and speech sounds slurred.  Tier 1 stroke code called with subsequent CT and MRI imaging without acute new findings.  Urine was checked and appears grossly infected.  UTI could explain his dizziness and diaphoresis.  UTI treated with ceftriaxone dose here and prescribed cefdinir.  Discharged home with Instructions on diagnosis including ED return precautions.    I have reviewed the findings, diagnosis, plan and recommended follow up with the patient.    Patient instructions:   Please complete antibiotic prescription for urinary tract infection. Recommend daily  yogurt or probiotic while taking antibiotics to avoid potential antibiotic side effects including diarrhea.     Stroke evaluation was reassuring with no new findings on head CT or brain MRI.  Symptoms may be due to the urinary tract infection.    Follow-up with primary care provider if not back to normal baseline after completion of antibiotics.    Please review attached instructions including reasons to return to the emergency department.      Discharge Medication List as of 2/14/2023  1:27 PM      START taking these medications    Details   cefdinir (OMNICEF) 300 MG capsule Take 1 capsule (300 mg) by mouth 2 times daily for 6 days, Disp-12 capsule, R-0, E-Prescribe             Final diagnoses:   Acute cystitis with hematuria       2/14/2023   [facility] AND \[facility] [place] [facility]\""       Paddy Lowe MD  02/14/23 3591

## 2023-02-14 NOTE — ED TRIAGE NOTES
Pt brought in by his spouse with c/o similar symptoms as his previous stroke. Pt sat up at 0730 felt dizzy, diaphoretic, had slurred speech, and a more pronounced Lt sided facial droop per his daughter. BP (!) 146/119   Pulse 80   Temp (!) 96.7  F (35.9  C) (Tympanic)   Resp 16   SpO2 98%     Pt had a stroke in November 2022 with Lt sided deficits remaining.      Triage Assessment     Row Name 02/14/23 0943       Triage Assessment (Adult)    Airway WDL WDL       Respiratory WDL    Respiratory WDL WDL       Skin Circulation/Temperature WDL    Skin Circulation/Temperature WDL WDL       Cardiac WDL    Cardiac WDL WDL       Peripheral/Neurovascular WDL    Peripheral Neurovascular WDL X  left arm deficits, baseline       Cognitive/Neuro/Behavioral WDL    Cognitive/Neuro/Behavioral WDL X

## 2023-02-14 NOTE — DISCHARGE INSTRUCTIONS
Please complete antibiotic prescription for urinary tract infection. Recommend daily yogurt or probiotic while taking antibiotics to avoid potential antibiotic side effects including diarrhea.     Stroke evaluation was reassuring with no new findings on head CT or brain MRI.  Symptoms may be due to the urinary tract infection.    Follow-up with primary care provider if not back to normal baseline after completion of antibiotics.    Please review attached instructions including reasons to return to the emergency department.

## 2023-02-14 NOTE — CONSULTS
[facility]    Stroke Telephone Note    I was called by Paddy Lowe on 02/14/23 regarding patient Ammon Harding. The patient is a 72 year old male with pertinent past medical history of HLD, history of tobacco use, HTN, CKD, alcohol use, recent R napoleon and b/l cerebellar ischemic infarcts 11/10/22 -had basilar tip thrombus and R vert origin chronic occlusion and L vert not contributing to basilar so had been deemed not a candidate for thrombectomy given no vascular access- was discharged on Eliquis by [facility] providers for ESUS (I do not see that any atrial fibrillation was found) reportedly has residual L sided weakness and dysphagia. TTE had been performed without evidence of PFO, EF 60.5%, mild LVH, severe pulmonary HTN, no evidence of intracardiac mass/thrombus, LA mildly enlarged. He had CTA 2/6/23 that showed recanalized basilar artery.    He woke up this morning at his baseline. He then had a bowel movement around 0730 and then had dizziness, diaphoresis, slurred speech, and L facial droop. The dizziness and diaphoresis resolved but other symptoms persisted so he was brought to ED for evaluation. /119. . He was reportedly taking his Eliquis so deemed not candidate for TNK. Leukocytosis >11K present.       Stroke Code Data (for stroke code without tele)  Stroke code activated 02/14/23   0948   Stroke provider first response  02/14/23   0949            Last known normal 02/14/23   0729        Time of discovery   (or onset of symptoms) 02/14/23   0730   Head CT read by Stroke Neuro Dr/Provider 02/14/23   0953   Was stroke code de-escalated? Yes 02/14/23 1005          Imaging Findings   CTH:  Chronic left cerebellar infarct. Subacute to chronic  anterior right pontine infarct. Slightly limited CT assessment for  recurrent posterior fossa ischemia due to streak artifact. Recommend  MR brain for further assessment.    CTA head and neck:  Persistent occlusion of the right  "V1 and proximal V2 segments of the  dominant right vertebral artery. Nonvisualization, likely due to  hypoplasia, of the post PICA left V4 segment. Bilateral posterior  commuting arteries are again present.     Unchanged basilar enhancement.     Unchanged anterior circulation.    Intravenous Thrombolysis  Not given due to:   - DOAC dose within 48 hours or INR > 1.7    Endovascular Treatment  Not initiated due to absence of proximal vessel occlusion    Impression  Acute on chronic slurred speech and L facial droop, has leukocytosis- rule out infectious/metabolic/toxic etiologies for possible stroke recrudescence, versus recurrent stroke    New transient dizziness and diaphoresis, suspect possible vasovagal while having a bowel movement but may have led to hypoperfusion which caused further posterior infarct, has chronic R vert occlusion and chronic basilar     11/10/22 R napoleon and b/l cerebellar ischemic infarcts in setting of ESUS, was started on Eliquis at [facility], no documentation of Afib in chart    Recommendations   -MRI brain w/wo contrast, please call once resulted so we can review images  -hold eliquis pending stroke size  -permissive HTN SBP <220 pending MRI    ADDENDUM:   MRI reviewed and negative for acute stroke, okay to continue PTA Eliquis and normotension goal  -evaluate for infectious/metabolic/toxic etiologies for possible stroke recrudescence      My recommendations are based on the information provided over the phone by Ammon Harding's in-person providers. They are not intended to replace the clinical judgment of his in-person providers. I was not requested to personally see or examine the patient at this time.    Ely Kim PA-C  Vascular Neurology    To page me or covering stroke neurology team member, click here: [facility]  Choose \"On Call\" tab at top, then select \"NEUROLOGY/ALL SITES\" from middle drop-down box, press Enter, then look for \"stroke\" or \"telestroke\" for your site.      "

## 2023-02-16 DIAGNOSIS — L89.301 PRESSURE INJURY OF BUTTOCK, STAGE 1, UNSPECIFIED LATERALITY: Primary | ICD-10-CM

## 2023-02-16 LAB — BACTERIA UR CULT: ABNORMAL

## 2023-02-16 NOTE — PROGRESS NOTES
DME (Durable Medical Equipment) Orders and Documentation  Orders Placed This Encounter   Procedures     Miscellaneous DME Order      The patient was assessed and it was determined the patient is in need of the following listed DME Supplies/Equipment. Please complete supporting documentation below to demonstrate medical necessity.      DME All Other Item(s) Documentation    List reason for need and supporting documentation for medical necessity below for each DME item.     1.  Stroke with immobility

## 2023-02-17 LAB
ATRIAL RATE - MUSE: 72 BPM
DIASTOLIC BLOOD PRESSURE - MUSE: NORMAL MMHG
INTERPRETATION ECG - MUSE: NORMAL
P AXIS - MUSE: 72 DEGREES
PR INTERVAL - MUSE: 154 MS
QRS DURATION - MUSE: 132 MS
QT - MUSE: 446 MS
QTC - MUSE: 488 MS
R AXIS - MUSE: 20 DEGREES
SYSTOLIC BLOOD PRESSURE - MUSE: NORMAL MMHG
T AXIS - MUSE: 35 DEGREES
VENTRICULAR RATE- MUSE: 72 BPM

## 2023-02-19 ENCOUNTER — TELEPHONE (OUTPATIENT)
Dept: EMERGENCY MEDICINE | Facility: OTHER | Age: 73
End: 2023-02-19
Payer: MEDICARE

## 2023-02-19 NOTE — TELEPHONE ENCOUNTER
"[facility] Emergency Department/Urgent Care Lab result notification [Positive for uti and bacteria is susceptible to antibiotic]:    Reason for call:   Notify of Final urine culture result, confirm patient is taking antibiotic, assess symptoms, and advise per Emergency Dept/Urgent Care discharge instructions and Emergency Dept urine culture protocol.    Lab Result & Date of Final Report [copied from Result Note]:    Final Urine Culture Report on 2/16/23  [facility] Emergency Dept discharge antibiotic prescribed: Cefdinir (Omnicef) 300 mg capsule, 1 capsule (300 mg) by mouth 2 times daily for 6 days  #1. Bacteria, >100,000 CFU/mL Escherichia coli , is SUSCEPTIBLE to Antibiotic.    No change in treatment per [facility] ED lab result Urine Culture protocol.    Current symptoms (include time patient called):    3:26PM: Patient states he is \"feeling ok.\"   Has had no further dizziness since starting the antibiotic.  Has no urinary symptoms.     Recommendations/Instructions:   Patient notified of lab result and treatment recommendation.   Take antibiotic as directed by the Emergency Dept/Urgent Care Provider.  Advised to follow up with his PCP as directed by the ED provider, return to ED with any worsening symptoms.   The patient is comfortable with the information given and has no further questions.     Please contact you PCP or return to the Emergency Department if your:    Symptoms return    Symptomas do not improved after completing antibiotic.    Symptoms worsen or other concerning symptoms      Randa Yu RN  [facility] [place]  Emergency Dept Lab Result RN  Ph# [phone]           "

## 2023-03-03 ENCOUNTER — TELEPHONE (OUTPATIENT)
Dept: INTERNAL MEDICINE | Facility: OTHER | Age: 73
End: 2023-03-03
Payer: MEDICARE

## 2023-03-03 NOTE — TELEPHONE ENCOUNTER
Andry requesting verbal orders: PT 2 times a week for 3 weeks regarding balance and gait. Aware MAF is out until 3/7/23.    Alyssia Puga on 3/3/2023 at 1:25 PM

## 2023-03-13 ENCOUNTER — TELEPHONE (OUTPATIENT)
Dept: INTERNAL MEDICINE | Facility: OTHER | Age: 73
End: 2023-03-13
Payer: MEDICARE

## 2023-03-13 NOTE — TELEPHONE ENCOUNTER
Left message to call back....................  3/13/2023   11:29 AM  Mamta Barron RN on 3/13/2023 at 11:29 AM

## 2023-03-15 ENCOUNTER — TELEPHONE (OUTPATIENT)
Dept: INTERNAL MEDICINE | Facility: OTHER | Age: 73
End: 2023-03-15
Payer: MEDICARE

## 2023-03-15 DIAGNOSIS — I63.12 ACUTE STROKE DUE TO EMBOLISM OF BASILAR ARTERY (H): Primary | ICD-10-CM

## 2023-03-15 NOTE — TELEPHONE ENCOUNTER
Call to Floridalma at [facility], notified her of this being signed today. Pending by our referral department.    Left message with referral department to send this ASAP as [facility] needs this no later than Friday.  Mamta Barron RN on 3/15/2023 at 4:37 PM

## 2023-03-15 NOTE — TELEPHONE ENCOUNTER
Please call with with an order or referral to [facility] for PT and OT.      Mili Montemayor on 3/15/2023 at 9:56 AM

## 2023-03-15 NOTE — TELEPHONE ENCOUNTER
Floridalma from [facility] called regarding orders for outpatient PT and OT to be completed at [facility].  There was fax request sent 3/7/23. [facility] has not received the orders yet. Please call.    Lakesha Solorio on 3/15/2023 at 3:30 PM

## 2023-03-15 NOTE — TELEPHONE ENCOUNTER
Referral placed this morning, patient should be contacted in 2-3 business days.  Mamta Barron RN on 3/15/2023 at 12:49 PM

## 2023-03-15 NOTE — TELEPHONE ENCOUNTER
Reason for call: Request for a referral.    Referral requested for what concern?  Occupational and physical therapy (out patient)    Have you already been seen by the specialty you need the referral for?  Unknown    Where do you want to go?   [facility]  in [place]    Additional comments:  Taz stated the patient already has an appointment setup with [facility].    Preferred method for responding to this message: Telephone Call    Phone number patient can be reached at? Other phone number: Taz [phone]    If we can't reach you directly, may we leave a detailed response at the number you provided? Yes     Taz called and requested a referral on the patient's behave and a call back to verify the referral was sent.    Radha Motley on 3/15/2023 at 12:33 PM

## 2023-03-16 ENCOUNTER — TELEPHONE (OUTPATIENT)
Dept: INTERNAL MEDICINE | Facility: OTHER | Age: 73
End: 2023-03-16
Payer: MEDICARE

## 2023-03-16 DIAGNOSIS — I63.12 ACUTE STROKE DUE TO EMBOLISM OF BASILAR ARTERY (H): Primary | ICD-10-CM

## 2023-03-16 NOTE — TELEPHONE ENCOUNTER
PT needs OT and PT referral for [facility]. Pt has Ot appt tomorrow 3/17/2023 if mProvider doesn't get referral on time pt needs to reschedule.     Please call pt if any questions       Hemalatha Bains on 3/16/2023 at 10:01 AM

## 2023-03-16 NOTE — TELEPHONE ENCOUNTER
Patient needs a referral to [facility] [place]. Please complete form.  Abena Gorman LPN on 3/16/2023 at 10:47 AM

## 2023-03-21 ENCOUNTER — TELEPHONE (OUTPATIENT)
Dept: INTERNAL MEDICINE | Facility: OTHER | Age: 73
End: 2023-03-21
Payer: MEDICARE

## 2023-03-21 ENCOUNTER — TRANSFERRED RECORDS (OUTPATIENT)
Dept: HEALTH INFORMATION MANAGEMENT | Facility: OTHER | Age: 73
End: 2023-03-21
Payer: MEDICARE

## 2023-03-21 NOTE — TELEPHONE ENCOUNTER
Left message to call back....................  3/21/2023   4:19 PM  Need verbal ok from patient to contact dental office.  Mamta Barron RN on 3/21/2023 at 4:19 PM

## 2023-03-21 NOTE — TELEPHONE ENCOUNTER
Questions about dental work after medical conditions in the last few months.  Please call.    Jan Spaulding on 3/21/2023 at 2:34 PM

## 2023-03-22 NOTE — TELEPHONE ENCOUNTER
Date of birth and last name verified.  Patient had a Stroke on 11-.  His dentist is suggesting he wait for 6 months after that stroke to have cleaning unless his PCP thinks he could have it sooner.      I did call the dental office.  Sylvia explains that they would like to know if he will need to premedicate,  If he has any restrictions to getting dental work and if he can go ahead with the cleaning as planned?    Daren Koehler .......  3/22/2023  4:16 PM

## 2023-03-23 NOTE — TELEPHONE ENCOUNTER
Called the dental office, left message for Sylvia to call back.    Nhan Gay LPN on 3/23/2023 at 8:27 AM

## 2023-03-23 NOTE — TELEPHONE ENCOUNTER
Dr. Deshpande comments relayed to Sylvia.  She does state understanding.    Daren Koehler .......  3/23/2023  4:18 PM

## 2023-03-23 NOTE — TELEPHONE ENCOUNTER
Sylvia from [facility] returning missed call from Nhan Puga on 3/23/2023 at 2:51 PM

## 2023-03-31 ENCOUNTER — TRANSFERRED RECORDS (OUTPATIENT)
Dept: HEALTH INFORMATION MANAGEMENT | Facility: OTHER | Age: 73
End: 2023-03-31
Payer: MEDICARE

## 2023-04-27 ENCOUNTER — TRANSFERRED RECORDS (OUTPATIENT)
Dept: HEALTH INFORMATION MANAGEMENT | Facility: OTHER | Age: 73
End: 2023-04-27

## 2023-04-27 LAB
ALT SERPL-CCNC: 48 U/L (ref 0–55)
AST SERPL-CCNC: 26 U/L (ref 5–40)
CHOLESTEROL (EXTERNAL): 138 MG/DL
CREATININE (EXTERNAL): 1.2 MG/DL (ref 0.5–1.5)
GFR ESTIMATED (EXTERNAL): 67 ML/MIN/1.73M2
GLUCOSE (EXTERNAL): 97 MG/DL (ref 70–100)
HDLC SERPL-MCNC: 53 MG/DL
LDL CHOLESTEROL CALCULATED (EXTERNAL): 72 MG/DL
POTASSIUM (EXTERNAL): 4.5 MMOL/L (ref 3.5–5)
TRIGLYCERIDES (EXTERNAL): 65 MG/DL

## 2023-05-18 ENCOUNTER — TRANSFERRED RECORDS (OUTPATIENT)
Dept: HEALTH INFORMATION MANAGEMENT | Facility: OTHER | Age: 73
End: 2023-05-18
Payer: MEDICARE

## 2023-06-06 ENCOUNTER — TRANSFERRED RECORDS (OUTPATIENT)
Dept: HEALTH INFORMATION MANAGEMENT | Facility: OTHER | Age: 73
End: 2023-06-06
Payer: MEDICARE

## 2023-06-16 ENCOUNTER — OFFICE VISIT (OUTPATIENT)
Dept: PEDIATRICS | Facility: OTHER | Age: 73
End: 2023-06-16
Attending: INTERNAL MEDICINE
Payer: MEDICARE

## 2023-06-16 VITALS
HEIGHT: 66 IN | WEIGHT: 184 LBS | DIASTOLIC BLOOD PRESSURE: 72 MMHG | BODY MASS INDEX: 29.57 KG/M2 | RESPIRATION RATE: 16 BRPM | TEMPERATURE: 97.2 F | OXYGEN SATURATION: 97 % | HEART RATE: 52 BPM | SYSTOLIC BLOOD PRESSURE: 118 MMHG

## 2023-06-16 DIAGNOSIS — I27.20 PULMONARY HYPERTENSION (H): ICD-10-CM

## 2023-06-16 DIAGNOSIS — I10 BENIGN ESSENTIAL HYPERTENSION: ICD-10-CM

## 2023-06-16 DIAGNOSIS — E78.2 MIXED HYPERLIPIDEMIA: ICD-10-CM

## 2023-06-16 DIAGNOSIS — R13.10 DYSPHAGIA, UNSPECIFIED TYPE: ICD-10-CM

## 2023-06-16 DIAGNOSIS — Z76.89 ENCOUNTER TO ESTABLISH CARE: Primary | ICD-10-CM

## 2023-06-16 DIAGNOSIS — R73.09 ELEVATED GLUCOSE LEVEL: ICD-10-CM

## 2023-06-16 DIAGNOSIS — F17.290 PIPE SMOKER: ICD-10-CM

## 2023-06-16 DIAGNOSIS — I50.812 CHRONIC SYSTOLIC RIGHT HEART FAILURE (H): ICD-10-CM

## 2023-06-16 DIAGNOSIS — N40.0 BENIGN PROSTATIC HYPERPLASIA WITHOUT URINARY OBSTRUCTION: ICD-10-CM

## 2023-06-16 DIAGNOSIS — R13.12 OROPHARYNGEAL DYSPHAGIA: ICD-10-CM

## 2023-06-16 DIAGNOSIS — Z86.73 HISTORY OF CVA (CEREBROVASCULAR ACCIDENT): ICD-10-CM

## 2023-06-16 DIAGNOSIS — R97.20 ELEVATED PROSTATE SPECIFIC ANTIGEN (PSA): ICD-10-CM

## 2023-06-16 DIAGNOSIS — I63.12 ACUTE STROKE DUE TO EMBOLISM OF BASILAR ARTERY (H): ICD-10-CM

## 2023-06-16 DIAGNOSIS — I69.354 FLACCID HEMIPLEGIA OF LEFT NONDOMINANT SIDE AS LATE EFFECT OF CEREBRAL INFARCTION (H): ICD-10-CM

## 2023-06-16 LAB
HBA1C MFR BLD: 5.1 % (ref 4–6.2)
PSA SERPL DL<=0.01 NG/ML-MCNC: 5.19 NG/ML (ref 0–6.5)

## 2023-06-16 PROCEDURE — 99214 OFFICE O/P EST MOD 30 MIN: CPT | Performed by: INTERNAL MEDICINE

## 2023-06-16 PROCEDURE — G0463 HOSPITAL OUTPT CLINIC VISIT: HCPCS

## 2023-06-16 PROCEDURE — 83036 HEMOGLOBIN GLYCOSYLATED A1C: CPT | Mod: ZL | Performed by: INTERNAL MEDICINE

## 2023-06-16 PROCEDURE — 36415 COLL VENOUS BLD VENIPUNCTURE: CPT | Mod: ZL | Performed by: INTERNAL MEDICINE

## 2023-06-16 PROCEDURE — 84153 ASSAY OF PSA TOTAL: CPT | Mod: ZL | Performed by: INTERNAL MEDICINE

## 2023-06-16 RX ORDER — LISINOPRIL 10 MG/1
10 TABLET ORAL DAILY
Qty: 90 TABLET | Refills: 3 | Status: SHIPPED | OUTPATIENT
Start: 2023-06-16

## 2023-06-16 RX ORDER — ATORVASTATIN CALCIUM 80 MG/1
80 TABLET, FILM COATED ORAL DAILY
Qty: 90 TABLET | Refills: 3 | Status: SHIPPED | OUTPATIENT
Start: 2023-06-16

## 2023-06-16 RX ORDER — FINASTERIDE 5 MG/1
5 TABLET, FILM COATED ORAL DAILY
Qty: 90 TABLET | Refills: 3 | Status: SHIPPED | OUTPATIENT
Start: 2023-06-16

## 2023-06-16 RX ORDER — ASPIRIN 81 MG/1
81 TABLET ORAL DAILY
COMMUNITY
Start: 2023-02-17

## 2023-06-16 ASSESSMENT — PAIN SCALES - GENERAL: PAINLEVEL: NO PAIN (0)

## 2023-06-16 NOTE — NURSING NOTE
"Chief Complaint   Patient presents with     Follow Up     Stroke, [facility] follow up      Establish Care         Initial /72   Pulse 52   Temp 97.2  F (36.2  C) (Tympanic)   Resp 16   Ht 1.676 m (5' 6\")   Wt 83.5 kg (184 lb)   SpO2 97%   BMI 29.70 kg/m   Estimated body mass index is 29.7 kg/m  as calculated from the following:    Height as of this encounter: 1.676 m (5' 6\").    Weight as of this encounter: 83.5 kg (184 lb).         Norma J. Gosselin, FAB   "

## 2023-06-16 NOTE — PROGRESS NOTES
Assessment & Plan   1. Encounter to establish care      2. Pulmonary hypertension (H)  3. Chronic systolic right heart failure (H)  Given the finding of severe pulmonary hypertension with right-sided weakness I recommended obtaining a sleep study and pulmonary function testing which the patient declined.    4. Pipe smoker  He has been a long-term pipe smoker however does not meet criteria for lung cancer screening.  I encouraged him to contact the [facility] and his insurance company to see if this would be covered.    5. 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  - atorvastatin (LIPITOR) 80 MG tablet; Take 1 tablet (80 mg) by mouth daily  Dispense: 90 tablet; Refill: 3    6. Benign essential hypertension  At goal, no change.  - atorvastatin (LIPITOR) 80 MG tablet; Take 1 tablet (80 mg) by mouth daily  Dispense: 90 tablet; Refill: 3  - lisinopril (ZESTRIL) 10 MG tablet; Take 1 tablet (10 mg) by mouth daily  Dispense: 90 tablet; Refill: 3    7. Benign prostatic hyperplasia without urinary obstruction  At goal, no change.  - finasteride (PROSCAR) 5 MG tablet; Take 1 tablet (5 mg) by mouth daily  Dispense: 90 tablet; Refill: 3    8. Oropharyngeal dysphagia  - omeprazole (PRILOSEC) 20 MG DR capsule; Take 1 capsule (20 mg) by mouth daily  Dispense: 90 capsule; Refill: 3    9. Elevated prostate specific antigen (PSA)  - Prostate Specific Antigen; Future  - Prostate Specific Antigen    10. Elevated glucose level  - Hemoglobin A1c; Future  - Hemoglobin A1c    11. 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  His wife reports he does cough a lot.  I recommend a consultation with speech therapy.  - Speech Therapy Referral; Future    12. History of CVA (cerebrovascular accident)  The only modifiable risk factor that we have not fully excluded would be paroxysmal atrial fibrillation and I recommend event monitor  - Leadless  "EKG Monitor 8 to 14 Days; Future  - Speech Therapy Referral; Future    13. Mixed hyperlipidemia    14. Dysphagia, unspecified type  - Speech Therapy Referral; Future      Patient Instructions    -- Continue PT and OT therapy at [facility]   -- Zio patch      No follow-ups on file.    Signed, Av Duncan MD, FAAP, FACP  Internal Medicine & Pediatrics    Subjective   Ammon Harding is a 72 year old male who presents for [facility] primary care.  Previous physician was Dr. Ramos.  Unfortunately he had a stroke over the winter resulting in left-sided hemiparesis.  Attributed to a right basilar artery stenosis.  He has seen neurology in follow-up.  Is recommended to take aspirin lifelong.  Is working with [email or web address] physical [facility]ational [facility].  [facility]  They are wondering if he should get a lung cancer screening scan.  He smokes a pipe.    Objective   Vitals: /72   Pulse 52   Temp 97.2  F (36.2  C) (Tympanic)   Resp 16   Ht 1.676 m (5' 6\")   Wt 83.5 kg (184 lb)   SpO2 97%   BMI 29.70 kg/m      General: well appearing  Neck: No anterior or posterior bruits  CV: Regular rate and rhythm, no murmur, rub or gallop  Pulm: Clear to auscultation bilaterally, no wheezing, rales or rhonchi  Neuro: Grossly intact  Musculoskeletal: No lower extremity edema  Neuro: There is weakness on the left side of the body.  Skin: No rash  Psychiatry: Normal affect and insight.    Review and Analysis of Data   I personally reviewed the following:  External notes: Yes Notes reviewed via care everywhere from [facility] hospitalization  Results: Yes I reviewed echocardiogram, head and neck imaging reports, alert recent lab work  Use of an independent historian: Yes sPoke with wife  Independent review of a test performed by another physician: No  Discussion of management with another physician: No  Moderate risk of morbidity from additional diagnostic testing and/or treatment.    "

## 2023-07-10 ENCOUNTER — HOSPITAL ENCOUNTER (OUTPATIENT)
Dept: CARDIOLOGY | Facility: OTHER | Age: 73
Discharge: HOME | End: 2023-07-10
Attending: INTERNAL MEDICINE | Admitting: INTERNAL MEDICINE
Payer: MEDICARE

## 2023-07-10 DIAGNOSIS — Z86.73 HISTORY OF CVA (CEREBROVASCULAR ACCIDENT): ICD-10-CM

## 2023-07-10 PROCEDURE — 93246 EXT ECG>7D<15D RECORDING: CPT

## 2023-07-10 PROCEDURE — 999N000157 HC STATISTIC RCP TIME EA 10 MIN

## 2023-07-10 PROCEDURE — 93248 EXT ECG>7D<15D REV&INTERPJ: CPT | Performed by: INTERNAL MEDICINE

## 2023-07-10 NOTE — PATIENT INSTRUCTIONS
Date Placed on Pt:  07/10/2023    Patient instructed not to:   -take baths, swim, sauna   -remove device prior to 14 days   -use electric blankets   -shower or sweat excessively within first 24 hours of device application  Patient instructed to:   -shower as needed   -be carefull when toweling off and dressing   -press button when cardiac symptoms occur   -document symptoms in log book   -remove and return device (send via mail to [employer])   -Call [employer] with any questions

## 2023-07-27 ENCOUNTER — TRANSFERRED RECORDS (OUTPATIENT)
Dept: HEALTH INFORMATION MANAGEMENT | Facility: OTHER | Age: 73
End: 2023-07-27
Payer: MEDICARE

## 2023-08-30 ENCOUNTER — TRANSFERRED RECORDS (OUTPATIENT)
Dept: HEALTH INFORMATION MANAGEMENT | Facility: OTHER | Age: 73
End: 2023-08-30
Payer: MEDICARE

## 2023-08-31 ENCOUNTER — TELEPHONE (OUTPATIENT)
Dept: PEDIATRICS | Facility: OTHER | Age: 73
End: 2023-08-31
Payer: MEDICARE

## 2023-08-31 NOTE — TELEPHONE ENCOUNTER
Fax received from [facility] for provider to review and sign PT-plan of care progress note. Placed in Av Duncan MD basket.

## 2023-10-04 ENCOUNTER — OFFICE VISIT (OUTPATIENT)
Dept: PEDIATRICS | Facility: OTHER | Age: 73
End: 2023-10-04
Attending: INTERNAL MEDICINE
Payer: MEDICARE

## 2023-10-04 VITALS
HEART RATE: 86 BPM | WEIGHT: 191 LBS | RESPIRATION RATE: 16 BRPM | HEIGHT: 66 IN | OXYGEN SATURATION: 95 % | BODY MASS INDEX: 30.7 KG/M2 | SYSTOLIC BLOOD PRESSURE: 132 MMHG | TEMPERATURE: 98.4 F | DIASTOLIC BLOOD PRESSURE: 80 MMHG

## 2023-10-04 DIAGNOSIS — L82.0 INFLAMED SEBORRHEIC KERATOSIS: ICD-10-CM

## 2023-10-04 DIAGNOSIS — Z23 NEED FOR VACCINATION: ICD-10-CM

## 2023-10-04 DIAGNOSIS — F43.20 ADJUSTMENT DISORDER, UNSPECIFIED TYPE: ICD-10-CM

## 2023-10-04 DIAGNOSIS — Z86.73 HISTORY OF CVA (CEREBROVASCULAR ACCIDENT): Primary | ICD-10-CM

## 2023-10-04 DIAGNOSIS — G81.94 LEFT HEMIPARESIS (H): ICD-10-CM

## 2023-10-04 DIAGNOSIS — Z23 NEED FOR COVID-19 VACCINE: ICD-10-CM

## 2023-10-04 PROCEDURE — 91320 SARSCV2 VAC 30MCG TRS-SUC IM: CPT

## 2023-10-04 PROCEDURE — G0463 HOSPITAL OUTPT CLINIC VISIT: HCPCS | Mod: 25 | Performed by: INTERNAL MEDICINE

## 2023-10-04 PROCEDURE — 90662 IIV NO PRSV INCREASED AG IM: CPT

## 2023-10-04 PROCEDURE — 99214 OFFICE O/P EST MOD 30 MIN: CPT | Performed by: INTERNAL MEDICINE

## 2023-10-04 ASSESSMENT — PAIN SCALES - GENERAL: PAINLEVEL: NO PAIN (0)

## 2023-10-04 NOTE — PATIENT INSTRUCTIONS
-- OT assessment for driving     -- See a therapist   -- Gratitude list      [place] counselors/therapists   Telephone Kids? Address   [facility]  [place] [phone] Yes, 12+ [address]  [email or web address]   [facility]  (Many counselors) [phone] Yes [address]   [email or web address]://[email or web address]   [facility]  (Many counselors) [phone] Yes [address]   [email or web address]://[email or web address]   [facility]  (Many counselors) [phone] Yes [address]  [email or web address]://[email or web address]   [facility]  Vlad Donis [phone] Yes [facility]  [address]  [email or web address]   [facility]  Cesar Coronado [phone] Yes [address]   [address]  [email or web address]   Caroline Cuadra [phone]  [address]  [email or web address]   [facility] Yoandy Marie  And others... [phone]  [address]  [email or web address]   Kacie Galicia [phone]  [address]   Sylvia Aguirre [phone]  [address] [place] [address]   Alannah Nava [phone] Yes [address]   Brayan Pitt [phone]  [address]    Alek Montemayor [phone]  [address]   [facility]  Luh Acosta [phone]  [address] [place] [address]    Alva Pillai [phone]  [address]  ([facility])  [email or web address]   [facility]  Lincoln Deshpande [phone]  [address]  [email or web address]  [email or web address]   [facility] Michele Rinne Cindy Thomas [phone]     [facility]: [place] [phone] Yes HILARIO Smalls  [facility] [address]  http://[email or web address]/   [facility]: [place] [phone] Yes HILARIO Clarke  [address]  [email or web address] County [email or web address]

## 2023-10-04 NOTE — PROGRESS NOTES
Assessment & Plan   1. History of CVA (cerebrovascular accident)  2. Left hemiparesis (H)  In order to drive again I recommend an occupational therapy driving assessment.  His limiting factors will be his fairly dense left-sided upper extremity hemiparesis and reaction times.  Follow-up after results to discuss  - Occupational Therapy Referral; Future    3. Inflamed seborrheic keratosis  The one on his right lateral torso I think might go away with tying a fishing line around the base.  If he wants it removed to schedule a shave biopsy procedure visit.  Many on the back none look concerning return if worsening.    4. Adjustment disorder, unspecified type  I recommend he sees a therapist.  He is not interested in medication at this time.    5. Need for vaccination  - INFLUENZA VACCINE 65+ (FLUZONE HD)  - COVID-19 MONOVALENT 12+ (PFIZER)      Patient Instructions    -- OT assessment for driving     -- See a therapist   -- Gratitude list      [place] counselors/therapists   Telephone Kids? Address   [facility] [phone] Yes, 12+ [address]  [email or web address]/[email or web address]/[email or web address] Elena   [facility]  (Many counselors) [phone] Yes [address]   http://www.[facility]   [facility]  (Many counselors) [phone] Yes [address]   [email or web address]://[email or web address]   [facility]  (Many counselors) [phone] Yes [address]  [email or web address]://[email or web address]   [facility]  Vlad Donis [phone] Yes [facility]  [address]  [email or web address]   [facility]  Cesar Coronado [phone] Yes [address]   [address]  [email or web address]   Caroline Cuadra [phone]  [address] [facility]  [email or web address]   [facility]  Wan Marie  And others... [phone]  [address]  "[address]  https://[email or web address]raction[email or web address].com/   Kacie Galicia [phone]  [address]   Sylvia Aguirre [phone]  [address]   Alannah Nava [phone] Yes [address]   Brayan Pitt [phone]  [address]   Alek Montemayor [phone]  [address] [facility]   [facility]  Luh Acosta [phone]  [address]   [address] [place] [address]    Alva Pillai [phone]  [address] [place] [address] [address]  ([facility])  [email or web address]   [facility]  Wendy Deshpande [phone]  [address]  [email or web address]  [email or web address]   [facility]  Michele Rinne Cindy Thomas [phone]     [facility]: [place] [phone] Yes [place]  [address]  http://www.[email or web address]   [facility]: [place] [phone] Yes [place]  [address] [facility]  http://[email or web address]           No follow-ups on file.    Signed, Av Duncan MD, FAAP, FACP  Internal Medicine & Pediatrics    Subjective   Ammon Harding is a 72 year old male who presents for discuss several concerns.  He wants to know the processes of getting his license back.  He had a stroke on November 10, 2022.  He has suffered with left-sided hemiparesis.  He is having improvement more so in the left lower extremity but some in the left arm.  [facility] who diagnosed him with adjustment disorder due to other condition.  He wants to know what this means.  He has not seen a counselor.  He does feel lonely when his wife leaves the house for more than a few minutes.  He has a lot of moles.    Objective   Vitals: /80   Pulse 86   Temp 98.4  F (36.9  C) (Tympanic)   Resp 16   Ht 1.676 m (5' 6\")   Wt 86.6 kg (191 lb)   SpO2 95%   BMI 30.83 kg/m      Skin: Many stuck on appearing raised slightly roughened plaques throughout the back and side of the torso      "

## 2023-10-04 NOTE — NURSING NOTE
"Chief Complaint   Patient presents with    Follow Up     Discuss [facility] recommendations- Adjustment Disorder due to physical condition.    Pre-driving Assessment     License was cancelled in 2/24/23 and he is wondering what the process is of getting it back after his stroke.         Initial /80   Pulse 86   Temp 98.4  F (36.9  C) (Tympanic)   Resp 16   Ht 1.676 m (5' 6\")   Wt 86.6 kg (191 lb)   SpO2 95%   BMI 30.83 kg/m   Estimated body mass index is 30.83 kg/m  as calculated from the following:    Height as of this encounter: 1.676 m (5' 6\").    Weight as of this encounter: 86.6 kg (191 lb).         Norma J. Gosselin, LPN     "

## 2023-10-10 PROCEDURE — 91320 SARSCV2 VAC 30MCG TRS-SUC IM: CPT | Performed by: INTERNAL MEDICINE

## 2023-10-12 ENCOUNTER — TRANSFERRED RECORDS (OUTPATIENT)
Dept: HEALTH INFORMATION MANAGEMENT | Facility: OTHER | Age: 73
End: 2023-10-12
Payer: MEDICARE

## 2023-11-02 ENCOUNTER — THERAPY VISIT (OUTPATIENT)
Dept: OCCUPATIONAL THERAPY | Facility: OTHER | Age: 73
End: 2023-11-02
Attending: INTERNAL MEDICINE
Payer: MEDICARE

## 2023-11-02 DIAGNOSIS — G81.94 LEFT HEMIPARESIS (H): ICD-10-CM

## 2023-11-02 DIAGNOSIS — Z86.73 HISTORY OF CVA (CEREBROVASCULAR ACCIDENT): ICD-10-CM

## 2023-11-02 PROCEDURE — 97535 SELF CARE MNGMENT TRAINING: CPT | Mod: GO,KX | Performed by: OCCUPATIONAL THERAPIST

## 2023-11-02 PROCEDURE — 97112 NEUROMUSCULAR REEDUCATION: CPT | Mod: GO,KX | Performed by: OCCUPATIONAL THERAPIST

## 2023-11-02 PROCEDURE — 97130 THER IVNTJ EA ADDL 15 MIN: CPT | Mod: GO,KX | Performed by: OCCUPATIONAL THERAPIST

## 2023-11-02 PROCEDURE — 97166 OT EVAL MOD COMPLEX 45 MIN: CPT | Mod: GO,KX | Performed by: OCCUPATIONAL THERAPIST

## 2023-11-02 PROCEDURE — 97129 THER IVNTJ 1ST 15 MIN: CPT | Mod: GO,KX | Performed by: OCCUPATIONAL THERAPIST

## 2023-11-02 NOTE — PROGRESS NOTES
Occupational Therapy community Driving Assessment    Patient Name: Ammon Harding    Date of Birth  1950    Date of Report: 11/2/2023    Diagnosis: S/P CVA with left side hemiplegia    Pertinent Medical History: Seizure History? No    Current Medication: see chart    Current license? No cancelled on 2/8/2023 due to not being physically qualified   restrictions: corrective lens    Driving prior to current diagnosis/onset? Yes has no driven for a year    Typically Drive: local highways and in the town of [place].     Alternative transportation used by or available to patient: Pt's wife and friend's drive him.     Upper Body Strength and ROM (as it pertains to driving):       Left upper extremity: Strength: 2-/5 less than full ROM against gravity      Right: Strength: 5/5 AROM WFL    Neck AROM (to check blind spots): ROM: WFL    Gross Motor Skills (to manage steering wheel): WFL    Coordination RUE WNL  LUE: severely impaired     Sensation: intact in right and left upper extremities    Proprioception: bilaterally in BLE, limited in LUE     Strength: Left: Pounds 13 Right: Pounds 62    Lower body Strength and ROM (as it pertains to driving):    Left Leg 5/5    Right Leg 5/5    Mobility Assistance Required: Wheeled walker transitioning to cane    Vision: WFL    Glasses: for reading? Yes for distance? Yes  patient wears bifocals. No visual impairments noted    Saccades: (a rapid change of fixation from one point in visual field to another): Intact    Pursuits: (the continued fixation on a moving target within the central field of view): Intact    Fields: Peripheral Vision ([facility] standard 105 degrees or better): WFL    Indication of Neglect? No    Double Vision Reported? No    Visual-Perceptual and Cognitive Evaluations:    Traffic Safety Questions: Patient answered 3/3 questions correctly  MOCA: PT scored 26/30 with errors in memory (recalled 1/5 words) Able to pick out 3/4 words from category cue.     Was  designed as a rapid screening instrument for mild cognitive dysfunction. It assesses different cognitive domains: attention and concentration, executive functions, memory, language, visuoconstructional skills, conceptual thinking, calculations, and orientation.    Scoring: total possible score is 30 points; a score of 26 or above is considered normal.    Patient s score: 27/30 = normal    Activities of Daily Living: Primary responsibilities: laundry, cooking, med set up, driving, shopping, yard work.    Dynavision: Date Administered: 11/2/2023   Pt scored in the borderline range in 3/3 dynavision assessments. His reaction times with in a given parameter are WFL for driving ( <1.0 secs.) The parameters would be similar to sitting in the 's seat of a car and having limited reaching ability. Dynavision scores were affected by difficulty reaching lights due to: pt performed in sitting position due to decrease in standing balance; need to use a walker, and using just the dominate right hand for light activation.    Theses limitations would not have an affect on operating motor vehicle controls. Pt has good trunk control and is able to reach outside of his base of support with his dominate right hand, while in a sitting position, with out loss of balance.     The dynavision scores are based on a person performing the task in unsupported standing using both UE with out restriction.   Mode A Score: Goal > 52 = good/safe 45-51 = borderline    Patient Score:  48    Mode B Score: Goal >43 = safe; 35-41 = borderline < 34 = unsafe:    Patient Score:  24    4 minutes endurance Score: Goal: >200 = safe; 175-199 = borderline; < 175=unsafe    Patient Score:  182      Interactive Driving Simulation Experience: Ammon was able to apply the gas and brake appropriately     Patient's Current Plan (return to driving): Pt is awaiting approval from medical provider to submit to the department of motor vehicle to have his license  reinstated.     Summary and Recommendations: Ammon demonstrates adequate cognitive and physical abilities to safely operate a motor vehicle. 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 He is cognitively aware of his deficits and is able to compensate.     Outcome: (Do not drive until your Physician specifically recommends driving.)      Copy given to patient / family member: No    The results of this pre-driving screen and recommendations will be sent to referring physician and medical records.    A CPT cutoff score of <4.7/5.6 showed 89% sensitivity for failing the road exam and 75% specificity for ability to pass.

## 2023-11-21 ENCOUNTER — TRANSFERRED RECORDS (OUTPATIENT)
Dept: HEALTH INFORMATION MANAGEMENT | Facility: OTHER | Age: 73
End: 2023-11-21
Payer: MEDICARE

## 2023-11-28 ENCOUNTER — MYC MEDICAL ADVICE (OUTPATIENT)
Dept: PEDIATRICS | Facility: OTHER | Age: 73
End: 2023-11-28
Payer: MEDICARE

## 2023-11-28 NOTE — LETTER
12/4/2023        RE: Ammon Harding  [address] [address]        Department of Motor Vehicle Services,      Ammon Harding completed a driving assessment by an Occupational Therapist on 11/2/2023.  The results have been reviewed and it is of my opinion that Ammon Harding is qualified, in all medical respects, to exercise reasonable and ordinary control over a motor vehicle.      Sincerely,        Av Duncan MD

## 2023-11-28 NOTE — LETTER
11/28/2023        RE: Ammon Harding  [address] [address]    To Whom It May Concern,      I have reviewed the Occupational Therapy Driving Assessment note completed on 11/2/2023.  With the information I have been provided, and in my opinion, Ammon can return to driving as long as he is aware of his strength limitations and is cautious.     Please consider re-instating Ammon's 's license.  The OT driving assessment will be attached to this letter.        Sincerely,        Av Duncan MD

## 2023-11-30 NOTE — TELEPHONE ENCOUNTER
I reviewed Shraddha JOHNS's note. Looks like as long as he is aware of his strength limitations and is cautious, he can return to driving.    Signed, Av Duncan MD, FAAP, FACP  Internal Medicine & Pediatrics

## 2023-12-01 ENCOUNTER — TRANSFERRED RECORDS (OUTPATIENT)
Dept: HEALTH INFORMATION MANAGEMENT | Facility: OTHER | Age: 73
End: 2023-12-01
Payer: MEDICARE

## 2023-12-01 NOTE — TELEPHONE ENCOUNTER
Called [phone]  and Vehicle services to see what is needed from provider for reinstating 's license.  Held for 20 minutes.  Will have to call back so other phone calls can be made.     Patient update on MyChart.    Shraddha Sanford RN on 12/1/2023 at 11:45 AM    Started drafting letter:      To Whom It May Concern,      I have reviewed the Occupational Therapy Driving Assessment note completed on 11/2/2023.  With the information I have been provided, and in my opinion, Ammon can return to driving as long as he is aware of his strength limitations and is cautious.     Please consider re-instating Ammon's 's license.  The OT driving assessment will be attached to this letter.

## 2023-12-01 NOTE — TELEPHONE ENCOUNTER
I did not notify the state.  Unless they did, no letter should be required.    Signed, Av Duncan MD, FAAP, FACP  Internal Medicine & Pediatrics

## 2023-12-01 NOTE — TELEPHONE ENCOUNTER
"Called [phone].  Held for 30 minutes to be told that they do need something but I will need to call a different number to find out what that is.      Need to call [phone].  Called this number again.  On hold for 25 more minutes.     Spoke with someone who said:    When the last person filled out a disability parking permit, they checked \"no\" under \"is this applicant qualified, in all medical respects, to exercise reasonable and ordinary control over a motor vehicle\".   So the license was revoked.      They just need a new Application for Disability Parking Certificate.      Will work on and have PCP sign.      Patient update on [employer].    Shraddha Sanford RN on 12/1/2023 at 3:38 PM                "

## 2023-12-05 NOTE — TELEPHONE ENCOUNTER
Letter signed by PCP.      Faxed to [employer] at number provided by patient.    Patient update on MyChart.    Sent original letter home with Evelina Campbell RN to give to Ammon.      Patient update on MyChart.    Shraddha Sanford RN on 12/5/2023 at 8:28 AM

## 2024-01-02 ENCOUNTER — OFFICE VISIT (OUTPATIENT)
Dept: PEDIATRICS | Facility: OTHER | Age: 74
End: 2024-01-02
Attending: INTERNAL MEDICINE
Payer: MEDICARE

## 2024-01-02 VITALS
HEIGHT: 66 IN | TEMPERATURE: 98.2 F | HEART RATE: 82 BPM | DIASTOLIC BLOOD PRESSURE: 94 MMHG | WEIGHT: 200 LBS | RESPIRATION RATE: 16 BRPM | OXYGEN SATURATION: 93 % | BODY MASS INDEX: 32.14 KG/M2 | SYSTOLIC BLOOD PRESSURE: 138 MMHG

## 2024-01-02 DIAGNOSIS — I69.322 DYSARTHRIA AS LATE EFFECT OF CEREBELLAR CEREBROVASCULAR ACCIDENT (CVA): ICD-10-CM

## 2024-01-02 DIAGNOSIS — D22.9 ATYPICAL MOLE: Primary | ICD-10-CM

## 2024-01-02 DIAGNOSIS — I69.354 FLACCID HEMIPLEGIA OF LEFT NONDOMINANT SIDE AS LATE EFFECT OF CEREBRAL INFARCTION (H): ICD-10-CM

## 2024-01-02 DIAGNOSIS — I27.20 PULMONARY HYPERTENSION (H): ICD-10-CM

## 2024-01-02 DIAGNOSIS — R13.12 OROPHARYNGEAL DYSPHAGIA: ICD-10-CM

## 2024-01-02 DIAGNOSIS — I63.12 ACUTE STROKE DUE TO EMBOLISM OF BASILAR ARTERY (H): ICD-10-CM

## 2024-01-02 PROCEDURE — G0463 HOSPITAL OUTPT CLINIC VISIT: HCPCS

## 2024-01-02 PROCEDURE — 11306 SHAVE SKIN LESION 0.6-1.0 CM: CPT | Performed by: INTERNAL MEDICINE

## 2024-01-02 PROCEDURE — 88305 TISSUE EXAM BY PATHOLOGIST: CPT

## 2024-01-02 PROCEDURE — 99214 OFFICE O/P EST MOD 30 MIN: CPT | Mod: 25 | Performed by: INTERNAL MEDICINE

## 2024-01-02 PROCEDURE — G0463 HOSPITAL OUTPT CLINIC VISIT: HCPCS | Mod: 25

## 2024-01-02 ASSESSMENT — PAIN SCALES - GENERAL: PAINLEVEL: NO PAIN (0)

## 2024-01-02 NOTE — NURSING NOTE
"Chief Complaint   Patient presents with    RECHECK     Talk about growth, test results and paperwork       Initial BP (!) 138/94   Pulse 82   Temp 98.2  F (36.8  C) (Tympanic)   Resp 16   Ht 1.676 m (5' 6\")   Wt 90.7 kg (200 lb)   SpO2 93%   BMI 32.28 kg/m   Estimated body mass index is 32.28 kg/m  as calculated from the following:    Height as of this encounter: 1.676 m (5' 6\").    Weight as of this encounter: 90.7 kg (200 lb).  Medication Review: complete    The next two questions are to help us understand your food security.  If you are feeling you need any assistance in this area, we have resources available to support you today.          1/2/2024   SDOH- Food Insecurity   Within the past 12 months, did you worry that your food would run out before you got money to buy more? N   Within the past 12 months, did the food you bought just not last and you didn t have money to get more? N         Health Care Directive:  Patient does not have a Health Care Directive or Living Will: Discussed advance care planning with patient; however, patient declined at this time.    Norma J. Gosselin, FAB      "

## 2024-01-02 NOTE — PROGRESS NOTES
"Assessment & Plan   1. Atypical mole  Differential diagnosis includes seborrheic keratosis, melanoma, melanoma in situ, benign nevus, others.  We discussed options and decided on shave biopsy.  I cleaned the area with alcohol.  I anesthetized with lidocaine with epinephrine 1%.  I cleaned with a chlorhexidine swab.  I remove the lesion with a shave technique.  I sent the sample to the lab in a sample container.  Bleeding was controlled with direct pressure and Drysol.  Aftercare is were discussed.  - Surgical Pathology Exam  - SHAV SKIN LESION SCLP/NCK/HNDS/FEET/GEN 0.6-1.0 CM    2. 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 Oropharyngeal dysphagia  5. 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 Pulmonary hypertension (H)  I completed the form with the patient today.  He does need a significant amount of assistance due to his history of a stroke resulting in left-sided hemiplegia.  He is significantly debilitated in his upper extremity to the point where he is nearly unable to use the arm.  His right lower extremity is a little better which does enable him to walk.  Despite his weakness in the arm he is able to utilize a walker.  He is working with physical therapy.      Return if symptoms worsen or fail to improve.    Signed, Av Duncan MD, FAAP, FACP  Internal Medicine & Pediatrics    Subjective   Ammon Harding is a 73 year old male who presents for discuss results, paperwork and look at growth.  [facility]  He wants a growth removed.    Objective   Vitals: BP (!) 138/94   Pulse 82   Temp 98.2  F (36.8  C) (Tympanic)   Resp 16   Ht 1.676 m (5' 6\")   Wt 90.7 kg (200 lb)   SpO2 93%   BMI 32.28 kg/m      Skin: Right lateral thorax there is a pedunculated darkly pigmented roughened mass with a narrower stalk approximately 0.75 cm and the mass " approximately 1.5 cm in diameter.  A scattering of many slightly raised stuck on appearing roughened varyingly pigmented moles on the back.  Neuro: Strength in right upper and lower extremity is 5/5.  Strength is in the left shoulder is 4/5 to abduction, 2/5 to flexion at the elbow and 0/5 to  at the hand.  1/5 to extension at the wrist.  Left lower extremity flexion at the hip 4/5 extension at the knee 4/5 flexion at the toe 3/5.    Review and Analysis of Data   I personally reviewed the following:  External notes: No  Results: Yes recent lab, last echo  Use of an independent historian: Yes wife  Independent review of a test performed by another physician: No  Discussion of management with another physician: No  Moderate risk of morbidity from additional diagnostic testing and/or treatment.

## 2024-01-03 ENCOUNTER — DOCUMENTATION ONLY (OUTPATIENT)
Dept: OTHER | Facility: CLINIC | Age: 74
End: 2024-01-03
Payer: MEDICARE

## 2024-01-05 LAB
PATH REPORT.COMMENTS IMP SPEC: NORMAL
PATH REPORT.FINAL DX SPEC: NORMAL
PHOTO IMAGE: NORMAL

## 2024-01-18 ENCOUNTER — TRANSFERRED RECORDS (OUTPATIENT)
Dept: HEALTH INFORMATION MANAGEMENT | Facility: OTHER | Age: 74
End: 2024-01-18
Payer: MEDICARE

## 2024-02-06 ENCOUNTER — TRANSFERRED RECORDS (OUTPATIENT)
Dept: HEALTH INFORMATION MANAGEMENT | Facility: OTHER | Age: 74
End: 2024-02-06
Payer: MEDICARE

## 2024-02-14 NOTE — NURSING NOTE
"Date of Surgery: 08/06/18   Type of Surgery: cyst removal right 2 nd toe  Surgeon:    [facility]:  [place]  Fax:     Fever/Chills or other infectious symptoms in past month: no  >10lb weight loss in past two months: no  O2 SAT: 97    Health Care Directive/Code status:  no  Hx of blood transfusions:   no  Td up to date:  yes  History of VRE/MRSA:  no Date:     Preoperative Evaluation: Obstructive Sleep Apnea screening    S: Snore -  Do you snore loudly? (louder than talking or loud enough to be heard through closed doors)no  T: Tired - Do you often feel tired, fatigued, or sleepy during the daytime?no  O: Observed - Has anyone ever observed you stop breathing during your sleep?no  P: Pressure - Do you have or are you being treated for high blood pressure?yes  B: BMI - BMI greater than 35kg/m2?no  A: Age - Age over 50 years old?yes  N: Neck - Neck circumference greater than 40 cm?yes  G: Gender - Gender: Male?yes    Total number of \"YES\" responses:  4    Scoring: Low risk of ELIANA 0-2  At Risk of ELAINA: >3 High Risk of ELAINA: 5-8    Tracey Saxena 7/30/2018 2:17 PM      " Route to pcp as update.    Please review and advise.    Called patient at [phone]     Full name of patient and  verified.    Said she has had this rash since .    Has seen derm and allergy several times.    Rash is itchy. Feels like \"100 ants are biting her\".    HydrOXYzine (ATARAX) 10 MG tablet helps the itching for about six hours.    Said she may get \"Dupixent\" injections.    She would like the TWIN test done again. Requests the \"more advanced\" test.    Also requests advanced thyroid test.    Advised that pcp returns to the office 2/15.

## 2024-03-11 ENCOUNTER — TRANSFERRED RECORDS (OUTPATIENT)
Dept: FAMILY MEDICINE | Facility: OTHER | Age: 74
End: 2024-03-11
Payer: MEDICARE

## 2024-03-11 VITALS — SYSTOLIC BLOOD PRESSURE: 120 MMHG | DIASTOLIC BLOOD PRESSURE: 67 MMHG

## 2024-03-11 NOTE — PROGRESS NOTES
Patient Quality Outreach    Patient is due for the following:   Hypertension -  BP check    Next Steps:   No follow up needed at this time.    Type of outreach:    Chart review performed, no outreach needed. Patient reports having a blood pressure of 120/67 on 3/9/2024. Flowsheet updated.    Questions for provider review:    None           Kristine Carver RN

## 2024-04-05 ENCOUNTER — TRANSFERRED RECORDS (OUTPATIENT)
Dept: HEALTH INFORMATION MANAGEMENT | Facility: OTHER | Age: 74
End: 2024-04-05
Payer: MEDICARE

## 2024-04-19 ENCOUNTER — TRANSFERRED RECORDS (OUTPATIENT)
Dept: HEALTH INFORMATION MANAGEMENT | Facility: OTHER | Age: 74
End: 2024-04-19
Payer: MEDICARE

## 2024-04-23 ENCOUNTER — PATIENT OUTREACH (OUTPATIENT)
Dept: PEDIATRICS | Facility: OTHER | Age: 74
End: 2024-04-23
Payer: MEDICARE

## 2024-04-23 NOTE — LETTER
[facility]  [address]  [place] [id number] [phone]       April 23, 2024    Ammon Harding  [address]  [place] [address]    Dear Ammon,    We care about your health and have reviewed your health plan and are making recommendations based on this review, to optimize your health.    You are in particular need of attention regarding:  -Wellness (Physical) Visit     We are recommending that you:  -schedule a WELLNESS (Physical) APPOINTMENT with me.   I will check fasting labs the same day - nothing to eat except water and meds for 8-10 hours prior. (If you go elsewhere for Wellness visits then please disregard this reminder.)    In addition, here is a list of due or overdue Health Maintenance reminders.    Health Maintenance Due   Topic Date Due    Heart Failure Action Plan  Never done    Kidney Microalbumin Urine Test  Never done    Thyroid Function Lab  Never done    Pneumococcal Vaccine (1 of 2 - PCV) Never done    Diptheria Tetanus Pertussis (DTAP/TDAP/TD) Vaccine (1 - Tdap) 08/13/2009    RSV VACCINE (Pregnancy & 60+) (1 - 1-dose 60+ series) Never done    Zoster (Shingles) Vaccine (2 of 3) 01/25/2013    Basic Metabolic Panel  08/14/2023    COVID-19 Vaccine (7 - 2023-24 season) 02/04/2024    FALL RISK ASSESSMENT  02/08/2024    Complete Blood Count  02/14/2024    Annual Wellness Visit  04/27/2024    Liver Monitoring Lab  04/27/2024    Cholesterol Lab  04/27/2024       To address the above recommendations, we encourage you to contact us at [phone].They will assist you with finding the most convenient time and location.    Thank you for trusting [facility] and we appreciate the opportunity to serve you.  We look forward to supporting your healthcare needs in the future.    Healthy Regards,    Your [facility] Team

## 2024-04-23 NOTE — TELEPHONE ENCOUNTER
Patient Quality Outreach    Patient is due for the following:   Physical Annual Wellness Visit    Next Steps:   Schedule a Annual Wellness Visit    Type of outreach:    Sent letter.      Questions for provider review:    None           Debo Hendricks

## 2024-04-29 ENCOUNTER — TRANSFERRED RECORDS (OUTPATIENT)
Dept: HEALTH INFORMATION MANAGEMENT | Facility: OTHER | Age: 74
End: 2024-04-29
Payer: MEDICARE

## 2024-04-29 LAB
ALT SERPL-CCNC: 26 U/L (ref 0–55)
AST SERPL-CCNC: 24 U/L (ref 5–40)
CHOLESTEROL (EXTERNAL): 118 MG/DL
CREATININE (EXTERNAL): 1 MG/DL (ref 0.5–1.5)
GFR ESTIMATED (EXTERNAL): 81 ML/MIN/1.73M2
GLUCOSE (EXTERNAL): 93 MG/DL (ref 70–100)
HDLC SERPL-MCNC: 51 MG/DL
LDL CHOLESTEROL CALCULATED (EXTERNAL): 54 MG/DL
POTASSIUM (EXTERNAL): 4.3 MMOL/L (ref 3.5–5)
TRIGLYCERIDES (EXTERNAL): 66 MG/DL

## 2024-04-30 ENCOUNTER — MYC MEDICAL ADVICE (OUTPATIENT)
Dept: PEDIATRICS | Facility: OTHER | Age: 74
End: 2024-04-30
Payer: MEDICARE

## 2024-04-30 NOTE — TELEPHONE ENCOUNTER
Spoke with Anna about care gaps.  He can only have one annual wellness visit a year.  This can not be added to chart at [facility] but labs can.     Clarifying question(s) sent to patient.      Shraddha Sanford RN on 4/30/2024 at 2:04 PM

## 2024-05-09 ENCOUNTER — TRANSFERRED RECORDS (OUTPATIENT)
Dept: HEALTH INFORMATION MANAGEMENT | Facility: OTHER | Age: 74
End: 2024-05-09
Payer: MEDICARE

## 2024-05-21 ENCOUNTER — MYC MEDICAL ADVICE (OUTPATIENT)
Dept: PEDIATRICS | Facility: OTHER | Age: 74
End: 2024-05-21
Payer: MEDICARE

## 2024-06-13 ENCOUNTER — TRANSFERRED RECORDS (OUTPATIENT)
Dept: HEALTH INFORMATION MANAGEMENT | Facility: OTHER | Age: 74
End: 2024-06-13
Payer: MEDICARE

## 2024-06-18 ENCOUNTER — TELEPHONE (OUTPATIENT)
Dept: PEDIATRICS | Facility: OTHER | Age: 74
End: 2024-06-18
Payer: MEDICARE

## 2024-07-10 ENCOUNTER — TRANSFERRED RECORDS (OUTPATIENT)
Dept: HEALTH INFORMATION MANAGEMENT | Facility: OTHER | Age: 74
End: 2024-07-10
Payer: COMMERCIAL

## 2024-07-25 ENCOUNTER — TRANSFERRED RECORDS (OUTPATIENT)
Dept: HEALTH INFORMATION MANAGEMENT | Facility: OTHER | Age: 74
End: 2024-07-25
Payer: MEDICARE

## 2024-08-26 NOTE — TELEPHONE ENCOUNTER
Addended by: CHANTALE OROSCO on: 8/26/2024 09:49 AM     Modules accepted: Orders     Needs verbal orders for discharge from homecare 1 week early.  Please call.    Jan Spaulding on 3/13/2023 at 9:53 AM

## 2024-08-29 ENCOUNTER — TRANSFERRED RECORDS (OUTPATIENT)
Dept: HEALTH INFORMATION MANAGEMENT | Facility: OTHER | Age: 74
End: 2024-08-29
Payer: MEDICARE

## 2024-09-25 ENCOUNTER — TRANSFERRED RECORDS (OUTPATIENT)
Dept: HEALTH INFORMATION MANAGEMENT | Facility: OTHER | Age: 74
End: 2024-09-25
Payer: MEDICARE

## 2024-10-01 ENCOUNTER — ALLIED HEALTH/NURSE VISIT (OUTPATIENT)
Dept: FAMILY MEDICINE | Facility: OTHER | Age: 74
End: 2024-10-01
Attending: INTERNAL MEDICINE
Payer: MEDICARE

## 2024-10-01 VITALS — TEMPERATURE: 97.6 F

## 2024-10-01 DIAGNOSIS — Z23 ENCOUNTER FOR IMMUNIZATION: Primary | ICD-10-CM

## 2024-10-01 DIAGNOSIS — Z23 HIGH PRIORITY FOR 2019-NCOV VACCINE: ICD-10-CM

## 2024-10-01 PROCEDURE — 90480 ADMN SARSCOV2 VAC 1/ONLY CMP: CPT

## 2024-10-01 PROCEDURE — G0008 ADMIN INFLUENZA VIRUS VAC: HCPCS

## 2024-10-01 NOTE — PROGRESS NOTES
Prior to immunization administration, verified patients identity using patient s name and date of birth. Please see Immunization Activity for additional information.     Is the patient's temperature normal (100.5 or less)? Yes     Patient MEETS CRITERIA. PROCEED with vaccine administration.          10/1/2024   COVID   Have you had myocarditis or pericarditis (inflammation of or around the heart muscle) after getting a COVID-19 vaccine? !YES   Have you had a serious reaction to a COVID vaccine or something in a COVID vaccine, like polyethylene glycol (PEG) or polysorbate? No   Have you had multisystem inflammatory syndrome from COVID-19 in the past 90 days? No            DO NOT VACCINATE. Patient is NOT eligible for vaccination. Discuss with provider at upcoming appointment if they have questions.    Patient instructed to remain in clinic for 15 minutes afterwards, and to report any adverse reactions.      Link to Ancillary Visit Immunization Standing Orders SmartSet     Screening performed by Luh Espinoza RN on 10/1/2024 at 10:35 AM.

## 2024-12-03 ENCOUNTER — HOSPITAL ENCOUNTER (OUTPATIENT)
Dept: GENERAL RADIOLOGY | Facility: OTHER | Age: 74
Discharge: HOME | End: 2024-12-03
Attending: STUDENT IN AN ORGANIZED HEALTH CARE EDUCATION/TRAINING PROGRAM
Payer: MEDICARE

## 2024-12-03 ENCOUNTER — HOSPITAL ENCOUNTER (INPATIENT)
Facility: OTHER | Age: 74
DRG: 177 | End: 2024-12-03
Attending: INTERNAL MEDICINE | Admitting: INTERNAL MEDICINE
Payer: MEDICARE

## 2024-12-03 ENCOUNTER — OFFICE VISIT (OUTPATIENT)
Dept: FAMILY MEDICINE | Facility: OTHER | Age: 74
End: 2024-12-03
Attending: NURSE PRACTITIONER
Payer: MEDICARE

## 2024-12-03 VITALS
OXYGEN SATURATION: 95 % | SYSTOLIC BLOOD PRESSURE: 130 MMHG | HEIGHT: 67 IN | DIASTOLIC BLOOD PRESSURE: 84 MMHG | WEIGHT: 195 LBS | RESPIRATION RATE: 16 BRPM | BODY MASS INDEX: 30.61 KG/M2 | TEMPERATURE: 97.9 F | HEART RATE: 81 BPM

## 2024-12-03 DIAGNOSIS — R53.83 FATIGUE, UNSPECIFIED TYPE: ICD-10-CM

## 2024-12-03 DIAGNOSIS — J10.1 INFLUENZA A: Primary | ICD-10-CM

## 2024-12-03 DIAGNOSIS — J10.1 INFLUENZA A: ICD-10-CM

## 2024-12-03 DIAGNOSIS — R05.9 COUGH IN ADULT: ICD-10-CM

## 2024-12-03 DIAGNOSIS — R50.9 FEVER IN ADULT: ICD-10-CM

## 2024-12-03 DIAGNOSIS — J69.0 ASPIRATION PNEUMONIA DUE TO VOMIT, UNSPECIFIED LATERALITY, UNSPECIFIED PART OF LUNG (H): ICD-10-CM

## 2024-12-03 DIAGNOSIS — J96.01 ACUTE RESPIRATORY FAILURE WITH HYPOXIA (H): Primary | ICD-10-CM

## 2024-12-03 PROBLEM — M62.81 GENERALIZED MUSCLE WEAKNESS: Status: ACTIVE | Noted: 2024-12-03

## 2024-12-03 PROBLEM — I63.12: Status: ACTIVE | Noted: 2022-12-15

## 2024-12-03 LAB
ALBUMIN SERPL BCG-MCNC: 3.9 G/DL (ref 3.5–5.2)
ALP SERPL-CCNC: 104 U/L (ref 40–150)
ALT SERPL W P-5'-P-CCNC: 37 U/L (ref 0–70)
ANION GAP SERPL CALCULATED.3IONS-SCNC: 8 MMOL/L (ref 7–15)
AST SERPL W P-5'-P-CCNC: 41 U/L (ref 0–45)
BILIRUB SERPL-MCNC: 0.5 MG/DL
BUN SERPL-MCNC: 18.2 MG/DL (ref 8–23)
CALCIUM SERPL-MCNC: 9.2 MG/DL (ref 8.8–10.4)
CHLORIDE SERPL-SCNC: 102 MMOL/L (ref 98–107)
CREAT SERPL-MCNC: 1.32 MG/DL (ref 0.67–1.17)
EGFRCR SERPLBLD CKD-EPI 2021: 56 ML/MIN/1.73M2
ERYTHROCYTE [DISTWIDTH] IN BLOOD BY AUTOMATED COUNT: 12.8 % (ref 10–15)
FLUAV RNA SPEC QL NAA+PROBE: POSITIVE
FLUBV RNA RESP QL NAA+PROBE: NEGATIVE
GLUCOSE SERPL-MCNC: 97 MG/DL (ref 70–99)
HCO3 SERPL-SCNC: 28 MMOL/L (ref 22–29)
HCT VFR BLD AUTO: 48.5 % (ref 40–53)
HGB BLD-MCNC: 16.1 G/DL (ref 13.3–17.7)
LACTATE SERPL-SCNC: 1 MMOL/L (ref 0.7–2)
MCH RBC QN AUTO: 31.4 PG (ref 26.5–33)
MCHC RBC AUTO-ENTMCNC: 33.2 G/DL (ref 31.5–36.5)
MCV RBC AUTO: 95 FL (ref 78–100)
PLATELET # BLD AUTO: 163 10E3/UL (ref 150–450)
POTASSIUM SERPL-SCNC: 4.2 MMOL/L (ref 3.4–5.3)
PROT SERPL-MCNC: 7 G/DL (ref 6.4–8.3)
RBC # BLD AUTO: 5.13 10E6/UL (ref 4.4–5.9)
RSV RNA SPEC NAA+PROBE: NEGATIVE
SARS-COV-2 RNA RESP QL NAA+PROBE: NEGATIVE
SODIUM SERPL-SCNC: 138 MMOL/L (ref 135–145)
WBC # BLD AUTO: 4.9 10E3/UL (ref 4–11)

## 2024-12-03 PROCEDURE — G0378 HOSPITAL OBSERVATION PER HR: HCPCS

## 2024-12-03 PROCEDURE — 36415 COLL VENOUS BLD VENIPUNCTURE: CPT | Performed by: INTERNAL MEDICINE

## 2024-12-03 PROCEDURE — 250N000013 HC RX MED GY IP 250 OP 250 PS 637: Performed by: INTERNAL MEDICINE

## 2024-12-03 PROCEDURE — 258N000003 HC RX IP 258 OP 636: Performed by: INTERNAL MEDICINE

## 2024-12-03 PROCEDURE — 71046 X-RAY EXAM CHEST 2 VIEWS: CPT

## 2024-12-03 PROCEDURE — 99223 1ST HOSP IP/OBS HIGH 75: CPT | Performed by: INTERNAL MEDICINE

## 2024-12-03 PROCEDURE — 84450 TRANSFERASE (AST) (SGOT): CPT | Performed by: INTERNAL MEDICINE

## 2024-12-03 PROCEDURE — 83605 ASSAY OF LACTIC ACID: CPT | Performed by: INTERNAL MEDICINE

## 2024-12-03 PROCEDURE — 84155 ASSAY OF PROTEIN SERUM: CPT | Performed by: INTERNAL MEDICINE

## 2024-12-03 PROCEDURE — G0463 HOSPITAL OUTPT CLINIC VISIT: HCPCS | Mod: 25

## 2024-12-03 PROCEDURE — 85014 HEMATOCRIT: CPT | Performed by: INTERNAL MEDICINE

## 2024-12-03 PROCEDURE — 250N000011 HC RX IP 250 OP 636: Performed by: INTERNAL MEDICINE

## 2024-12-03 PROCEDURE — 87637 SARSCOV2&INF A&B&RSV AMP PRB: CPT | Mod: ZL | Performed by: STUDENT IN AN ORGANIZED HEALTH CARE EDUCATION/TRAINING PROGRAM

## 2024-12-03 RX ORDER — LISINOPRIL 10 MG/1
10 TABLET ORAL DAILY
Status: DISCONTINUED | OUTPATIENT
Start: 2024-12-04 | End: 2024-12-06 | Stop reason: HOSPADM

## 2024-12-03 RX ORDER — ONDANSETRON 4 MG/1
4 TABLET, ORALLY DISINTEGRATING ORAL EVERY 6 HOURS PRN
Status: DISCONTINUED | OUTPATIENT
Start: 2024-12-03 | End: 2024-12-06 | Stop reason: HOSPADM

## 2024-12-03 RX ORDER — BACLOFEN 10 MG/1
1 TABLET ORAL 3 TIMES DAILY
Status: ON HOLD | COMMUNITY
Start: 2024-11-27

## 2024-12-03 RX ORDER — AMOXICILLIN 250 MG
2 CAPSULE ORAL 2 TIMES DAILY PRN
Status: DISCONTINUED | OUTPATIENT
Start: 2024-12-03 | End: 2024-12-06 | Stop reason: HOSPADM

## 2024-12-03 RX ORDER — OSELTAMIVIR PHOSPHATE 75 MG/1
75 CAPSULE ORAL 2 TIMES DAILY
Status: DISCONTINUED | OUTPATIENT
Start: 2024-12-03 | End: 2024-12-04

## 2024-12-03 RX ORDER — LIDOCAINE 40 MG/G
CREAM TOPICAL
Status: DISCONTINUED | OUTPATIENT
Start: 2024-12-03 | End: 2024-12-06 | Stop reason: HOSPADM

## 2024-12-03 RX ORDER — SODIUM CHLORIDE 9 MG/ML
INJECTION, SOLUTION INTRAVENOUS CONTINUOUS
Status: DISCONTINUED | OUTPATIENT
Start: 2024-12-03 | End: 2024-12-05

## 2024-12-03 RX ORDER — ACETAMINOPHEN 650 MG/1
650 SUPPOSITORY RECTAL EVERY 4 HOURS PRN
Status: DISCONTINUED | OUTPATIENT
Start: 2024-12-03 | End: 2024-12-06 | Stop reason: HOSPADM

## 2024-12-03 RX ORDER — POLYETHYLENE GLYCOL 3350 17 G/17G
17 POWDER, FOR SOLUTION ORAL 2 TIMES DAILY PRN
Status: DISCONTINUED | OUTPATIENT
Start: 2024-12-03 | End: 2024-12-06 | Stop reason: HOSPADM

## 2024-12-03 RX ORDER — ATORVASTATIN CALCIUM 40 MG/1
80 TABLET, FILM COATED ORAL AT BEDTIME
Status: DISCONTINUED | OUTPATIENT
Start: 2024-12-03 | End: 2024-12-06 | Stop reason: HOSPADM

## 2024-12-03 RX ORDER — AMOXICILLIN 250 MG
1 CAPSULE ORAL 2 TIMES DAILY PRN
Status: DISCONTINUED | OUTPATIENT
Start: 2024-12-03 | End: 2024-12-06 | Stop reason: HOSPADM

## 2024-12-03 RX ORDER — PROCHLORPERAZINE MALEATE 5 MG/1
5 TABLET ORAL EVERY 6 HOURS PRN
Status: DISCONTINUED | OUTPATIENT
Start: 2024-12-03 | End: 2024-12-06 | Stop reason: HOSPADM

## 2024-12-03 RX ORDER — FINASTERIDE 5 MG/1
5 TABLET, FILM COATED ORAL AT BEDTIME
Status: DISCONTINUED | OUTPATIENT
Start: 2024-12-03 | End: 2024-12-06 | Stop reason: HOSPADM

## 2024-12-03 RX ORDER — BACLOFEN 10 MG/1
10 TABLET ORAL 3 TIMES DAILY
Status: DISCONTINUED | OUTPATIENT
Start: 2024-12-03 | End: 2024-12-06 | Stop reason: HOSPADM

## 2024-12-03 RX ORDER — ACETAMINOPHEN 325 MG/1
650 TABLET ORAL EVERY 4 HOURS PRN
Status: DISCONTINUED | OUTPATIENT
Start: 2024-12-03 | End: 2024-12-06 | Stop reason: HOSPADM

## 2024-12-03 RX ORDER — ONDANSETRON 2 MG/ML
4 INJECTION INTRAMUSCULAR; INTRAVENOUS EVERY 6 HOURS PRN
Status: DISCONTINUED | OUTPATIENT
Start: 2024-12-03 | End: 2024-12-06 | Stop reason: HOSPADM

## 2024-12-03 RX ORDER — ASPIRIN 81 MG/1
81 TABLET ORAL DAILY
Status: DISCONTINUED | OUTPATIENT
Start: 2024-12-04 | End: 2024-12-06 | Stop reason: HOSPADM

## 2024-12-03 RX ORDER — PANTOPRAZOLE SODIUM 40 MG/1
40 TABLET, DELAYED RELEASE ORAL
Status: DISCONTINUED | OUTPATIENT
Start: 2024-12-04 | End: 2024-12-06 | Stop reason: HOSPADM

## 2024-12-03 RX ADMIN — FINASTERIDE 5 MG: 5 TABLET, FILM COATED ORAL at 23:03

## 2024-12-03 RX ADMIN — PROCHLORPERAZINE EDISYLATE 5 MG: 5 INJECTION INTRAMUSCULAR; INTRAVENOUS at 23:03

## 2024-12-03 RX ADMIN — ACETAMINOPHEN 650 MG: 325 TABLET, FILM COATED ORAL at 19:53

## 2024-12-03 RX ADMIN — ONDANSETRON 4 MG: 4 TABLET, ORALLY DISINTEGRATING ORAL at 21:45

## 2024-12-03 RX ADMIN — SODIUM CHLORIDE: 9 INJECTION, SOLUTION INTRAVENOUS at 17:18

## 2024-12-03 RX ADMIN — OSELTAMIVIR PHOSPHATE 75 MG: 75 CAPSULE ORAL at 15:59

## 2024-12-03 RX ADMIN — BACLOFEN 10 MG: 10 TABLET ORAL at 16:00

## 2024-12-03 RX ADMIN — ATORVASTATIN CALCIUM 80 MG: 40 TABLET, FILM COATED ORAL at 23:02

## 2024-12-03 RX ADMIN — OSELTAMIVIR PHOSPHATE 75 MG: 75 CAPSULE ORAL at 23:03

## 2024-12-03 RX ADMIN — BACLOFEN 10 MG: 10 TABLET ORAL at 23:03

## 2024-12-03 ASSESSMENT — ACTIVITIES OF DAILY LIVING (ADL)
ADLS_ACUITY_SCORE: 45
ADLS_ACUITY_SCORE: 39
ADLS_ACUITY_SCORE: 45
ADLS_ACUITY_SCORE: 39
ADLS_ACUITY_SCORE: 45
ADLS_ACUITY_SCORE: 39
ADLS_ACUITY_SCORE: 41
ADLS_ACUITY_SCORE: 71
ADLS_ACUITY_SCORE: 39
ADLS_ACUITY_SCORE: 73

## 2024-12-03 ASSESSMENT — PAIN SCALES - GENERAL: PAINLEVEL_OUTOF10: SEVERE PAIN (7)

## 2024-12-03 NOTE — NURSING NOTE
"Chief Complaint   Patient presents with    Cough     X 2 days    Fever     X 2 days    Fatigue     Patient in clinic with spouse.  Tx with tylenol and nyquil last night  Patient was using a walker for mobilization - stroke 2 years ago  Until Sunday night when he is unable to bear his weight Monday  Fatigue is worsening.    Initial /84 (BP Location: Right arm, Patient Position: Sitting, Cuff Size: Adult Large)   Pulse 81   Temp 97.9  F (36.6  C) (Tympanic)   Resp 16   Ht 1.702 m (5' 7\")   Wt 88.5 kg (195 lb)   SpO2 95%   BMI 30.54 kg/m   Estimated body mass index is 30.54 kg/m  as calculated from the following:    Height as of this encounter: 1.702 m (5' 7\").    Weight as of this encounter: 88.5 kg (195 lb).     Advance Care Directive on file? y    FOOD SECURITY SCREENING QUESTIONS:    The next two questions are to help us understand your food security.  If you are feeling you need any assistance in this area, we have resources available to support you today.    Hunger Vital Signs:  Within the past 12 months we worried whether our food would run out before we got money to buy more. Never  Within the past 12 months the food we bought just didn't last and we didn't have money to get more. Never  Michelle Tesfaye LPN,FAB on 12/3/2024 at 12:24 PM      Michelle Tesfaye LPN     "

## 2024-12-03 NOTE — PHARMACY-ADMISSION MEDICATION HISTORY
Pharmacist Admission Medication History    Admission medication history is complete. The information provided in this note is only as accurate as the sources available at the time of the update.    Information Source(s): Patient, Family member, Facility (U/NH/) medication list/MAR, and CareEverywhere/SureScripts via in-person and phone    Pertinent Information: Patient manages his medication with the help of his wife. [facility] No additional pertinent information    Changes made to PTA medication list:  Added: None  Deleted: None  Changed: None    Allergies reviewed with patient and updates made in EHR: yes    Medication History Completed By: Johnson Carranza RP 12/3/2024 3:15 PM    PTA Med List   Medication Sig Last Dose/Taking    acetaminophen (TYLENOL) 325 MG tablet Take 1-2 tablets (325-650 mg) by mouth every 4 hours as needed for mild pain 12/3/2024 Morning    aspirin 81 MG EC tablet Take 81 mg by mouth daily 12/3/2024 Morning    atorvastatin (LIPITOR) 80 MG tablet Take 1 tablet (80 mg) by mouth daily 12/2/2024 Evening    baclofen (LIORESAL) 10 MG tablet Take 1 tablet by mouth 3 times daily. 12/3/2024 Morning    finasteride (PROSCAR) 5 MG tablet Take 1 tablet (5 mg) by mouth daily 12/2/2024 Evening    lisinopril (ZESTRIL) 10 MG tablet Take 1 tablet (10 mg) by mouth daily 12/3/2024 Morning    omeprazole (PRILOSEC) 20 MG DR capsule Take 1 capsule (20 mg) by mouth daily 12/3/2024 Morning    SENNA-docusate sodium (SENNA S) 8.6-50 MG tablet Take 1 tablet by mouth 2 times daily as needed (constipation) Past Month

## 2024-12-03 NOTE — PLAN OF CARE
"PRIMARY DIAGNOSIS: \"GENERIC\" NURSING  OUTPATIENT/OBSERVATION GOALS TO BE MET BEFORE DISCHARGE:  ADLs back to baseline: No    Activity and level of assistance: Up with maximum assistance. Consider SW and/or PT evaluation.     Pain status: Pain free.    Return to near baseline physical activity: No     Discharge Planner Nurse   Safe discharge environment identified: Yes  Barriers to discharge: Yes       Entered by: Sherie Longo RN 12/03/2024      Please review provider order for any additional goals.   Nurse to notify provider when observation goals have been met and patient is ready for discharge.  "

## 2024-12-03 NOTE — PROGRESS NOTES
"  Assessment & Plan     (J10.1) Influenza A  (primary encounter diagnosis)    Comment: Influenza A.  Presenting with wife.  He is very fatigued, difficulty with transferring.  Spoke with wife who is his primary caregiver, she is uncomfortable bringing him home without any further support.  Spoke with social work, emergency room staff who recommended short-term to have him be observation.  Hospitalist Dr. Calhoun stated he would take patient on observation.    Plan: Patient will be transferred to observation.  He and wife are agreeable to this plan.    (R05.9) Cough in adult  Comment: Influenza A.  Plan: Influenza A/B, RSV and SARS-CoV2 PCR (COVID-19)        Nose, XR Chest 2 Views            (R53.83) Fatigue, unspecified type  Comment: Influenza A.  Plan: Influenza A/B, RSV and SARS-CoV2 PCR (COVID-19)        Nose, XR Chest 2 Views            (R50.9) Fever in adult  Comment: Influenza A.  Plan: Influenza A/B, RSV and SARS-CoV2 PCR (COVID-19)        Nose, XR Chest 2 Views      Ayla Verde is a 74 year old, presenting for the following health issues:  Cough (X 2 days), Fever (X 2 days), and Fatigue      HPI     Patient presents today with wife for concerns of cough, fever, fatigue.  He has not been eating well.  Drinking minimal fluids.  He has having more difficulty with ambulation, transfers.  Wife notes that she was trying to help him from the bathroom this morning when he \"fell to the bed\".  She was unable to help him up.  This did require the assistance of her daughter to come over to help.  He does normally work with occupational therapy as well as physical therapy.        Review of Systems  Constitutional, HEENT, cardiovascular, pulmonary, gi and gu systems are negative, except as otherwise noted.          Objective    /84 (BP Location: Right arm, Patient Position: Sitting, Cuff Size: Adult Large)   Pulse 81   Temp 97.9  F (36.6  C) (Tympanic)   Resp 16   Ht 1.702 m (5' 7\")   Wt 88.5 kg (195 lb) "   SpO2 95%   BMI 30.54 kg/m    Body mass index is 30.54 kg/m .      Physical Exam   GENERAL: alert and no distress  EYES: Eyes grossly normal to inspection, PERRL and conjunctivae and sclerae normal  HENT: ear canals and TM's normal, nose and mouth without ulcers or lesions  NECK: no adenopathy, no asymmetry, masses, or scars  RESP: lungs clear to auscultation - no rales, rhonchi or wheezes  CV: regular rate and rhythm, normal S1 S2  MS: left sided weakness, sitting in wheelchair, difficulty with movement      Results for orders placed or performed during the hospital encounter of 12/03/24   XR Chest 2 Views     Status: None    Narrative    PROCEDURE:  XR CHEST 2 VIEWS    HISTORY:  cough, fever; Cough in adult; Fatigue, unspecified type;  Fever in adult.     COMPARISON:  None.    FINDINGS:   The cardiac silhouette is normal in size. The pulmonary vasculature is  normal.  The lungs are clear. No pleural effusion or pneumothorax.      Impression    IMPRESSION:  No acute cardiopulmonary disease.      BLADIMIR ESPINOSA MD         SYSTEM ID:  [id number]   Results for orders placed or performed in visit on 12/03/24   Influenza A/B, RSV and SARS-CoV2 PCR (COVID-19) Nose     Status: Abnormal    Specimen: Nose; Swab   Result Value Ref Range    Influenza A PCR Positive (A) Negative    Influenza B PCR Negative Negative    RSV PCR Negative Negative    SARS CoV2 PCR Negative Negative    Narrative    Testing was performed using the Xpert Xpress CoV2/Flu/RSV Assay on the [employer] GeneXpert Instrument. This test should be ordered for the detection of SARS-CoV2, influenza, and RSV viruses in individuals with signs and symptoms of respiratory tract infection. This test is for in vitro diagnostic use under the [place] FDA for laboratories certified under CLIA to perform high or moderate complexity testing. This test has been [place] FDA cleared. A negative result does not rule out the presence of PCR inhibitors in the specimen or target RNA in  concentration below the limit of detection for the assay. If only one viral target is positive but coinfection with multiple targets is suspected, the sample should be re-tested with another FDA cleared, approved, or authorized test, if coninfection would change clinical management. This test was validated by the [facility]. These laboratories are certified under the Clinical Laboratory Improvement Amendments of 1988 (CLIA-88) as qualified to perfom high complexity laboratory testing.         Signed Electronically by: Mili Lozano PA-C

## 2024-12-03 NOTE — H&P
[facility]    History and Physical - Hospitalist Service       Date of Admission:  12/3/2024    Assessment & Plan      Ammon Harding is a 74 year old male admitted on 12/3/2024. He has a history of stroke with chronic left hemiplegia. He has acute worsening weakness and tested positive for influenza A.     Principal Problem:    Influenza due to influenza virus, type A, human    Generalized muscle weakness  Assessment: present on admission, onset of symptoms within 48 hrs. Not hypoxic, hemodynamically stable. Infection has exacerbated his chronic stroke deficits and he now needs assistance getting out of bed or a chair.   Plan: Observation   Tamiflu 75 mg BID for 5 days   Ambulate   Physical and occupational therapy       Acute stroke due to embolism of basilar artery (H)  Assessment: present on admission, occurred in 2022. Chronic left sided weakness that is exacerbated by the infection. I do not this this is a new stroke.   Plan: continue aspirin, statin   Physical and occupational therapy     Active Problems:    Benign essential hypertension  Assessment: chronic   Plan: continue lisinopril      Chronic kidney disease, stage 2 (mild)  Assessment: present on admission   Plan: check labs today and in AM      Pulmonary hypertension (H)  Assessment: chronic      Chronic systolic right heart failure (H)  Assessment: no evidence for heart failure exacerbation  Plan: follow intake and output. Patient getting intravenous fluids overnight.        Observation Goals: -diagnostic tests and consults completed and resulted, -vital signs normal or at patient baseline, -tolerating oral intake to maintain hydration, -adequate pain control on oral analgesics, -tolerating oral antibiotics or has plans for home infusion setup, -returns to baseline functional status, -safe disposition plan has been identified, Nurse to notify provider when observation goals have been met and patient is ready for  "discharge.  Diet: Combination Diet    DVT Prophylaxis: Pneumatic Compression Devices  Cason Catheter: Not present  Lines: None     Cardiac Monitoring: None  Code Status: Full Code      Clinically Significant Risk Factors Present on Admission                 # Drug Induced Platelet Defect: home medication list includes an antiplatelet medication   # Hypertension: Noted on problem list           # Obesity: Estimated body mass index is 32.53 kg/m  as calculated from the following:    Height as of this encounter: 1.702 m (5' 7\").    Weight as of this encounter: 94.2 kg (207 lb 11.2 oz).              Disposition Plan     Medically Ready for Discharge: Anticipated in 2-4 Days           Manish Calhoun MD  Hospitalist Service  [facility]  Securely message with [employer] (more info)  Text page via [facility] Paging/Directory     ______________________________________________________________________    Chief Complaint   weakness    History is obtained from the patient    History of Present Illness   Ammon Harding is a 74 year old male who presents to the rapid clinic today with 48 hours of worsening anorexia cough fever and weakness.  He has a history of a stroke in 2022 that causes chronic left-sided weakness.  He has been able to get up independently, however his wife helps him with activities of daily living.  Over the last 48 hours he has become increasingly weaker to the point of not being able to get out of the bed or out of a chair on his own.  This morning his wife was trying to help him in could not get him up.  She called their daughter and the 2 of them were able to get him up in a chair.  The patient states that  His weakness is present in his left side and seems to be an exacerbation of his chronic neurologic debility.    In the rapid clinic he is hemodynamically stable.  He is not hypoxic.  He was found to be positive for influenza A.  He was placed on observation for further " management.      Past Medical History    Past Medical History:   Diagnosis Date    Acute cerebrovascular accident (CVA) due to embolism of basilar artery (H) 11/2022    Left hemiparesis    Elevated prostate specific antigen (PSA)     2106,psa 5.1    Hyperlipidemia     Hypertension     Osteoarthritis        Past Surgical History   Past Surgical History:   Procedure Laterality Date    ARTHROPLASTY KNEE Right 2017    ARTHROSCOPY KNEE Left 2012    COLONOS[place]/2016 [place]    COLONOSCOPY N/A 03/22/2021    F/U 2026 tubular adenomas    PROSTATE BIOPSY, NEEDLE, SATURATION SAMPLING  2017    PROSTATE BIOPSY, NEEDLE, SATURATION SAMPLING  05/2020    Benign    TONSILLECTOMY      No Comments Provided    VASECTOMY      No Comments Provided       Prior to Admission Medications   Prior to Admission Medications   Prescriptions Last Dose Informant Patient Reported? Taking?   SENNA-docusate sodium (SENNA S) 8.6-50 MG tablet Past Month Spouse/Significant Other Yes Yes   Sig: Take 1 tablet by mouth 2 times daily as needed (constipation)   acetaminophen (TYLENOL) 325 MG tablet 12/3/2024 Morning Spouse/Significant Other Yes Yes   Sig: Take 1-2 tablets (325-650 mg) by mouth every 4 hours as needed for mild pain   aspirin 81 MG EC tablet 12/3/2024 Morning Self, Spouse/Significant Other, Pharmacy Yes Yes   Sig: Take 81 mg by mouth daily   atorvastatin (LIPITOR) 80 MG tablet 12/2/2024 Evening Self, Spouse/Significant Other, Pharmacy No Yes   Sig: Take 1 tablet (80 mg) by mouth daily   baclofen (LIORESAL) 10 MG tablet 12/3/2024 Morning Self, Spouse/Significant Other, Pharmacy Yes Yes   Sig: Take 1 tablet by mouth 3 times daily.   finasteride (PROSCAR) 5 MG tablet 12/2/2024 Evening Self, Spouse/Significant Other, Pharmacy No Yes   Sig: Take 1 tablet (5 mg) by mouth daily   lisinopril (ZESTRIL) 10 MG tablet 12/3/2024 Morning Self, Spouse/Significant Other, Pharmacy No Yes   Sig: Take 1 tablet (10 mg) by mouth daily   omeprazole  (PRILOSEC) 20 MG DR capsule 12/3/2024 Morning Self, Spouse/Significant Other, Pharmacy No Yes   Sig: Take 1 capsule (20 mg) by mouth daily      Facility-Administered Medications: None        Review of Systems    CONSTITUTIONAL:POSITIVE  for anorexia, chills, fatigue, and fever   INTEGUMENTARY/SKIN: NEGATIVE for worrisome rashes, moles or lesions  EYES: NEGATIVE for vision changes or irritation  ENT/MOUTH: NEGATIVE for ear, mouth and throat problems  RESP:POSITIVE for cough-productive and SOB/dyspnea  CV: NEGATIVE for chest pain, palpitations or peripheral edema  GI: NEGATIVE for nausea, abdominal pain, heartburn, or change in bowel habits  : NEGATIVE for frequency, dysuria, or hematuria  MUSCULOSKELETAL: NEGATIVE for significant arthralgias or myalgia  NEURO: POSITIVE for weakness on left  ENDOCRINE: NEGATIVE for temperature intolerance, skin/hair changes  HEME: NEGATIVE for bleeding problems  PSYCHIATRIC: NEGATIVE for changes in mood or affect    Social History   I have reviewed this patient's social history and updated it with pertinent information if needed.  Social History     Tobacco Use    Smoking status: Former     Types: Pipe    Smokeless tobacco: Never   Vaping Use    Vaping status: Never Used   Substance Use Topics    Alcohol use: Yes     Alcohol/week: 14.0 standard drinks of alcohol     Types: 14 Cans of beer per week    Drug use: No     Comment: Drug use: No         Family History   I have reviewed this patient's family history and updated it with pertinent information if needed.  Family History   Problem Relation Age of Onset    Heart Failure Father     Dementia Father     Other - See Comments Mother         Dementia    Diabetes Brother     Heart Disease Other         Heart Disease,In distant relatives         Allergies   No Known Allergies     Physical Exam   Vital Signs: Temp: 99.6  F (37.6  C) Temp src: Tympanic BP: 137/86 Pulse: 80   Resp: 18 SpO2: 93 % O2 Device: None (Room air)    Weight: 207  lbs 11.2 oz    Constitutional: In no apparent distress  Eyes: pupils reactive, extraocular movements intact. Anicteric sclera.   HEENT: Oropharynx nonerythematous. Neck supple, no JVD.  Respiratory: scattered crackles  Cardiovascular: regular, no murmur. no lower extremity edema.  GI: soft, non-tender, bowel sounds present.  Lymph/Hematologic: no cervical or supraclavicular LAD.  Genitourinary: deferred  Skin: no rashes, or sores  Musculoskeletal: no joint erythema or swelling  Neurologic: cranial nerves symmetric. Neuro exam shows left sided weakness  Psychiatric: alert and oriented x3. Interactive.       Medical Decision Making       75 MINUTES SPENT BY ME on the date of service doing chart review, history, exam, documentation & further activities per the note.      Data     I have personally reviewed the following data over the past 24 hrs:    4.9  \   16.1   / 163     138 102 18.2 /  97   4.2 28 1.32 (H) \     ALT: 37 AST: 41 AP: 104 TBILI: 0.5   ALB: 3.9 TOT PROTEIN: 7.0 LIPASE: N/A     Procal: N/A CRP: N/A Lactic Acid: 1.0         Imaging results reviewed over the past 24 hrs:   Recent Results (from the past 24 hours)   XR Chest 2 Views    Narrative    PROCEDURE:  XR CHEST 2 VIEWS    HISTORY:  cough, fever; Cough in adult; Fatigue, unspecified type;  Fever in adult.     COMPARISON:  None.    FINDINGS:   The cardiac silhouette is normal in size. The pulmonary vasculature is  normal.  The lungs are clear. No pleural effusion or pneumothorax.      Impression    IMPRESSION:  No acute cardiopulmonary disease.      BLADIMIR ESPINOSA MD         SYSTEM ID:  [id number]

## 2024-12-03 NOTE — PROGRESS NOTES
12/03/24 1442   Initial Information   Patient Belongings remains with patient   Patient Belongings Remaining with Patient clothing;jewelry;vision aids;other (see comments)  (wheelchair in room)   Did you bring any home meds/supplements to the hospital?  No     A               Admission:  I am responsible for any personal items that are not sent to the safe or pharmacy.  [place] is not responsible for loss, theft or damage of any property in my possession.    Signature:  _________________________________ Date: _______  Time: _____                                              Staff Signature:  ____________________________ Date: ________  Time: _____      2nd Staff person, if patient is unable/unwilling to sign:    Signature: ________________________________ Date: ________  Time: _____     Discharge:  [place] has returned all of my personal belongings:    Signature: _________________________________ Date: ________  Time: _____                                          Staff Signature:  ____________________________ Date: ________  Time: _____

## 2024-12-03 NOTE — PLAN OF CARE
SAFETY CHECKLIST  ID Bands and Risk clasps correct and in place (DNR, Fall risk, Allergy, Latex, Limb):  Yes  All Lines Reconciled and labeled correctly: Yes  Whiteboard updated:Yes  Environmental interventions: Yes  Verify Tele #: N/A    Sherie Longo RN .......  12/3/2024  3:06 PM

## 2024-12-03 NOTE — PLAN OF CARE
"[facility] ADMISSION NOTE    Patient admitted to room 352 at approximately 1442 via wheel chair from [facility] urgent care. Patient was accompanied by spouse and daughter.     Verbal SBAR report received from ENEDELIA Morelos prior to patient arrival.     Patient pivoted to bed with 2 person assist with gait belt. Patient alert and oriented X 4. The patient is not having any pain.  . Admission vital signs: Blood pressure 137/86, pulse 80, temperature 99.6  F (37.6  C), temperature source Tympanic, resp. rate 18, height 1.702 m (5' 7\"), weight 94.2 kg (207 lb 11.2 oz), SpO2 93%. Patient was oriented to plan of care, call light, bed controls, tv, telephone, bathroom, and visiting hours.     Risk Assessment    The following safety risks were identified during admission: fall. Yellow risk band applied: YES.     Skin Initial Assessment    This writer admitted this patient and completed a full skin assessment and Darrel score in the Adult PCS flowsheet.   Photo documentation of skin problem and/or wound competed via [employer] application (located under Media):  N/A    Appropriate interventions initiated as needed.       Education    Patient has a [place] to Observation order: Yes  Observation education completed and documented: Yes      Sherie Longo RN      "

## 2024-12-04 ENCOUNTER — APPOINTMENT (OUTPATIENT)
Dept: GENERAL RADIOLOGY | Facility: OTHER | Age: 74
End: 2024-12-04
Attending: INTERNAL MEDICINE
Payer: MEDICARE

## 2024-12-04 ENCOUNTER — PATIENT OUTREACH (OUTPATIENT)
Dept: CARE COORDINATION | Facility: CLINIC | Age: 74
End: 2024-12-04

## 2024-12-04 ENCOUNTER — APPOINTMENT (OUTPATIENT)
Dept: PHYSICAL THERAPY | Facility: OTHER | Age: 74
End: 2024-12-04
Attending: INTERNAL MEDICINE
Payer: MEDICARE

## 2024-12-04 ENCOUNTER — APPOINTMENT (OUTPATIENT)
Dept: OCCUPATIONAL THERAPY | Facility: OTHER | Age: 74
End: 2024-12-04
Attending: INTERNAL MEDICINE
Payer: MEDICARE

## 2024-12-04 PROBLEM — J96.01 ACUTE RESPIRATORY FAILURE WITH HYPOXIA (H): Status: ACTIVE | Noted: 2024-12-04

## 2024-12-04 PROBLEM — J69.0 ASPIRATION PNEUMONIA (H): Status: ACTIVE | Noted: 2024-12-04

## 2024-12-04 LAB
ANION GAP SERPL CALCULATED.3IONS-SCNC: 10 MMOL/L (ref 7–15)
BUN SERPL-MCNC: 23.4 MG/DL (ref 8–23)
CALCIUM SERPL-MCNC: 8.2 MG/DL (ref 8.8–10.4)
CHLORIDE SERPL-SCNC: 104 MMOL/L (ref 98–107)
CREAT SERPL-MCNC: 1.92 MG/DL (ref 0.67–1.17)
EGFRCR SERPLBLD CKD-EPI 2021: 36 ML/MIN/1.73M2
ERYTHROCYTE [DISTWIDTH] IN BLOOD BY AUTOMATED COUNT: 12.9 % (ref 10–15)
GLUCOSE SERPL-MCNC: 122 MG/DL (ref 70–99)
HCO3 SERPL-SCNC: 24 MMOL/L (ref 22–29)
HCT VFR BLD AUTO: 43.6 % (ref 40–53)
HGB BLD-MCNC: 14.6 G/DL (ref 13.3–17.7)
HOLD SPECIMEN: NORMAL
HOLD SPECIMEN: NORMAL
MCH RBC QN AUTO: 31.4 PG (ref 26.5–33)
MCHC RBC AUTO-ENTMCNC: 33.5 G/DL (ref 31.5–36.5)
MCV RBC AUTO: 94 FL (ref 78–100)
PLATELET # BLD AUTO: 134 10E3/UL (ref 150–450)
POTASSIUM SERPL-SCNC: 4.1 MMOL/L (ref 3.4–5.3)
RBC # BLD AUTO: 4.65 10E6/UL (ref 4.4–5.9)
SODIUM SERPL-SCNC: 138 MMOL/L (ref 135–145)
WBC # BLD AUTO: 12.4 10E3/UL (ref 4–11)

## 2024-12-04 PROCEDURE — 85018 HEMOGLOBIN: CPT | Performed by: INTERNAL MEDICINE

## 2024-12-04 PROCEDURE — G0378 HOSPITAL OBSERVATION PER HR: HCPCS

## 2024-12-04 PROCEDURE — 97165 OT EVAL LOW COMPLEX 30 MIN: CPT | Mod: GO | Performed by: OCCUPATIONAL THERAPIST

## 2024-12-04 PROCEDURE — 97530 THERAPEUTIC ACTIVITIES: CPT | Mod: GP

## 2024-12-04 PROCEDURE — 999N000157 HC STATISTIC RCP TIME EA 10 MIN

## 2024-12-04 PROCEDURE — 94640 AIRWAY INHALATION TREATMENT: CPT

## 2024-12-04 PROCEDURE — 71045 X-RAY EXAM CHEST 1 VIEW: CPT | Mod: TC

## 2024-12-04 PROCEDURE — 85041 AUTOMATED RBC COUNT: CPT | Performed by: INTERNAL MEDICINE

## 2024-12-04 PROCEDURE — 36415 COLL VENOUS BLD VENIPUNCTURE: CPT | Performed by: INTERNAL MEDICINE

## 2024-12-04 PROCEDURE — 80048 BASIC METABOLIC PNL TOTAL CA: CPT | Performed by: INTERNAL MEDICINE

## 2024-12-04 PROCEDURE — 250N000009 HC RX 250: Performed by: INTERNAL MEDICINE

## 2024-12-04 PROCEDURE — 250N000011 HC RX IP 250 OP 636: Performed by: INTERNAL MEDICINE

## 2024-12-04 PROCEDURE — 120N000001 HC R&B MED SURG/OB

## 2024-12-04 PROCEDURE — 99232 SBSQ HOSP IP/OBS MODERATE 35: CPT | Performed by: INTERNAL MEDICINE

## 2024-12-04 PROCEDURE — 258N000003 HC RX IP 258 OP 636: Performed by: INTERNAL MEDICINE

## 2024-12-04 PROCEDURE — 97535 SELF CARE MNGMENT TRAINING: CPT | Mod: GO | Performed by: OCCUPATIONAL THERAPIST

## 2024-12-04 PROCEDURE — 250N000013 HC RX MED GY IP 250 OP 250 PS 637: Performed by: INTERNAL MEDICINE

## 2024-12-04 PROCEDURE — 97161 PT EVAL LOW COMPLEX 20 MIN: CPT | Mod: GP

## 2024-12-04 RX ORDER — IPRATROPIUM BROMIDE AND ALBUTEROL SULFATE 2.5; .5 MG/3ML; MG/3ML
3 SOLUTION RESPIRATORY (INHALATION) EVERY 4 HOURS PRN
Status: DISCONTINUED | OUTPATIENT
Start: 2024-12-04 | End: 2024-12-06 | Stop reason: HOSPADM

## 2024-12-04 RX ORDER — PIPERACILLIN SODIUM, TAZOBACTAM SODIUM 4; .5 G/20ML; G/20ML
4.5 INJECTION, POWDER, LYOPHILIZED, FOR SOLUTION INTRAVENOUS EVERY 6 HOURS
Status: DISCONTINUED | OUTPATIENT
Start: 2024-12-04 | End: 2024-12-04

## 2024-12-04 RX ORDER — PIPERACILLIN SODIUM, TAZOBACTAM SODIUM 3; .375 G/15ML; G/15ML
3.38 INJECTION, POWDER, LYOPHILIZED, FOR SOLUTION INTRAVENOUS EVERY 6 HOURS
Status: DISCONTINUED | OUTPATIENT
Start: 2024-12-04 | End: 2024-12-05

## 2024-12-04 RX ORDER — PIPERACILLIN SODIUM, TAZOBACTAM SODIUM 3; .375 G/15ML; G/15ML
3.38 INJECTION, POWDER, LYOPHILIZED, FOR SOLUTION INTRAVENOUS EVERY 6 HOURS
Status: DISCONTINUED | OUTPATIENT
Start: 2024-12-04 | End: 2024-12-04

## 2024-12-04 RX ORDER — IPRATROPIUM BROMIDE AND ALBUTEROL SULFATE 2.5; .5 MG/3ML; MG/3ML
3 SOLUTION RESPIRATORY (INHALATION)
Status: DISCONTINUED | OUTPATIENT
Start: 2024-12-04 | End: 2024-12-04

## 2024-12-04 RX ORDER — OSELTAMIVIR PHOSPHATE 30 MG/1
30 CAPSULE ORAL 2 TIMES DAILY
Status: DISCONTINUED | OUTPATIENT
Start: 2024-12-04 | End: 2024-12-06 | Stop reason: HOSPADM

## 2024-12-04 RX ADMIN — IPRATROPIUM BROMIDE AND ALBUTEROL SULFATE 3 ML: .5; 3 SOLUTION RESPIRATORY (INHALATION) at 05:24

## 2024-12-04 RX ADMIN — PIPERACILLIN AND TAZOBACTAM 3.38 G: 3; .375 INJECTION, POWDER, LYOPHILIZED, FOR SOLUTION INTRAVENOUS at 01:41

## 2024-12-04 RX ADMIN — ATORVASTATIN CALCIUM 80 MG: 40 TABLET, FILM COATED ORAL at 21:37

## 2024-12-04 RX ADMIN — ASPIRIN 81 MG: 81 TABLET, COATED ORAL at 09:33

## 2024-12-04 RX ADMIN — OSELTAMIVIR PHOSPHATE 30 MG: 30 CAPSULE ORAL at 09:33

## 2024-12-04 RX ADMIN — FINASTERIDE 5 MG: 5 TABLET, FILM COATED ORAL at 21:37

## 2024-12-04 RX ADMIN — SODIUM CHLORIDE: 9 INJECTION, SOLUTION INTRAVENOUS at 06:25

## 2024-12-04 RX ADMIN — BACLOFEN 10 MG: 10 TABLET ORAL at 05:21

## 2024-12-04 RX ADMIN — ACETAMINOPHEN 650 MG: 325 TABLET, FILM COATED ORAL at 01:53

## 2024-12-04 RX ADMIN — OSELTAMIVIR PHOSPHATE 30 MG: 30 CAPSULE ORAL at 21:37

## 2024-12-04 RX ADMIN — PIPERACILLIN AND TAZOBACTAM 3.38 G: 3; .375 INJECTION, POWDER, LYOPHILIZED, FOR SOLUTION INTRAVENOUS at 17:20

## 2024-12-04 RX ADMIN — BACLOFEN 10 MG: 10 TABLET ORAL at 21:37

## 2024-12-04 RX ADMIN — BACLOFEN 10 MG: 10 TABLET ORAL at 14:34

## 2024-12-04 RX ADMIN — PIPERACILLIN AND TAZOBACTAM 3.38 G: 3; .375 INJECTION, POWDER, LYOPHILIZED, FOR SOLUTION INTRAVENOUS at 12:06

## 2024-12-04 RX ADMIN — SODIUM CHLORIDE: 9 INJECTION, SOLUTION INTRAVENOUS at 17:19

## 2024-12-04 RX ADMIN — PIPERACILLIN AND TAZOBACTAM 3.38 G: 3; .375 INJECTION, POWDER, LYOPHILIZED, FOR SOLUTION INTRAVENOUS at 06:25

## 2024-12-04 RX ADMIN — PANTOPRAZOLE SODIUM 40 MG: 40 TABLET, DELAYED RELEASE ORAL at 06:31

## 2024-12-04 ASSESSMENT — ACTIVITIES OF DAILY LIVING (ADL)
HEARING_DIFFICULTY_OR_DEAF: NO
DOING_ERRANDS_INDEPENDENTLY_DIFFICULTY: YES
ADLS_ACUITY_SCORE: 73
ADLS_ACUITY_SCORE: 74
ADLS_ACUITY_SCORE: 73
ADLS_ACUITY_SCORE: 74
ADLS_ACUITY_SCORE: 66
ADLS_ACUITY_SCORE: 71
ADLS_ACUITY_SCORE: 73
ADLS_ACUITY_SCORE: 66
ADLS_ACUITY_SCORE: 71
WALKING_OR_CLIMBING_STAIRS: STAIR CLIMBING DIFFICULTY, ASSISTANCE 1 PERSON
TOILETING_ASSISTANCE: TOILETING DIFFICULTY, ASSISTANCE 1 PERSON
DIFFICULTY_COMMUNICATING: NO
CONCENTRATING,_REMEMBERING_OR_MAKING_DECISIONS_DIFFICULTY: YES
DRESSING/BATHING_DIFFICULTY: YES
DIFFICULTY_EATING/SWALLOWING: NO
EQUIPMENT_CURRENTLY_USED_AT_HOME: WALKER, STANDARD;CANE, QUAD
DRESSING/BATHING: BATHING DIFFICULTY, ASSISTANCE 1 PERSON
ADLS_ACUITY_SCORE: 74
ADLS_ACUITY_SCORE: 66
ADLS_ACUITY_SCORE: 73
NUMBER_OF_TIMES_PATIENT_HAS_FALLEN_WITHIN_LAST_SIX_MONTHS: 1
TOILETING_ISSUES: YES
WEAR_GLASSES_OR_BLIND: YES
ADLS_ACUITY_SCORE: 74
FALL_HISTORY_WITHIN_LAST_SIX_MONTHS: YES
CHANGE_IN_FUNCTIONAL_STATUS_SINCE_ONSET_OF_CURRENT_ILLNESS/INJURY: YES
ADLS_ACUITY_SCORE: 71
WALKING_OR_CLIMBING_STAIRS_DIFFICULTY: YES
ADLS_ACUITY_SCORE: 71
TOILETING: 1-->ASSISTANCE (EQUIPMENT/PERSON) NEEDED (NOT DEVELOPMENTALLY APPROPRIATE)
ADLS_ACUITY_SCORE: 73
ADLS_ACUITY_SCORE: 74
ADLS_ACUITY_SCORE: 66
ADLS_ACUITY_SCORE: 74
ADLS_ACUITY_SCORE: 71
ADLS_ACUITY_SCORE: 71
ADLS_ACUITY_SCORE: 74
ADLS_ACUITY_SCORE: 71
TOILETING: 1-->ASSISTANCE (EQUIPMENT/PERSON) NEEDED

## 2024-12-04 NOTE — PROGRESS NOTES
Pt required increased O2 needs throughout the night, up to 4 lpm NC to maintain SpO2 above 92%. Was able to titrate to 3 lpm this AM after PRN neb tx. BS coarse crackles and wheezes. Will continue to titrate O2 as tolerated.    Blanche Basilio, RT

## 2024-12-04 NOTE — PHARMACY
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)    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)    Oropharyngeal dysphagia    Chronic left shoulder pain    Pulmonary hypertension (H)    Chronic systolic right heart failure (H)    Pipe smoker    Influenza due to influenza virus, type A, human    Generalized muscle weakness    Influenza A        Relevant Labs:  Recent Labs   Lab Test 12/04/24  0701 12/03/24  1459   WBC 12.4* 4.9   HGB 14.6 16.1   * 163        CrCl: 52.9 mL/min      Intake/Output Summary (Last 24 hours) at 12/4/2024 0715  Last data filed at 12/4/2024 0507  Gross per 24 hour   Intake 200 ml   Output 300 ml   Net -100 ml          Per Renal/Automatic Medication Dose Adjustment Protocols, will adjust:  -Oseltamivir renal dose adjustment- treatment of Influenza w/CrCl 30-60 mL/min.  -Zosyn: renal/automatic med dose adjustment- indication of aspiration PNA, CrCl > 40 mL/min, and pt weight > 90 kg .      Will continue to follow and make adjustments accordingly. Thank You.    Bob Carroll RPH ....................  12/4/2024   7:15 AM  Addendum:  Patient's updated labs from this AM now back- new CrCl = 36.3 mL/min. Will adjust Zosyn dose back to 3.375 g Q6H due to CrCl 20-40 mL/min. Oseltamivir dose will remain at 30 mg BID.  oBb Carroll RPH on 12/4/2024 at 8:49 AM

## 2024-12-04 NOTE — PROGRESS NOTES
Interdisciplinary Discharge Planning Note    Anticipated Discharge Date: 12/6-12/7    Anticipated Discharge Location: TBD    Clinical Needs Before Discharge:  stable functional status and stable oxygen requirement    Treatment Needs After Discharge:  None identified at this time    Potential Barriers to Discharge: None identified     KANDICE Morales  12/4/2024,  12:23 PM

## 2024-12-04 NOTE — PLAN OF CARE
"Goal Outcome Evaluation:      Plan of Care Reviewed With: patient    Overall Patient Progress: decliningOverall Patient Progress: declining    Outcome Evaluation: HTN, febrile, denies pain, increasing O2 needs to 4 lpm overnight, baseline L side deficit, increased lethargy/weakness    Pt Aox4, does not call appropriately at this time. Obtunded but able to make needs known. Forgetful   Htn periodically overnight into 160's, tachycardic in 100's. MD aware   O2 needs increased overnight, 4 lpm NC after emesis episode applied for presumed aspiration. Chest Xray and IV Abx ordered by MD  Tolerating diet order, incontinent of B&B  Did not get OOB this shift  Skin CDI  Male purewick placed    /74 (BP Location: Left arm, Patient Position: Semi-Ramos's, Cuff Size: Adult Regular)   Pulse 100   Temp 100.1  F (37.8  C) (Tympanic)   Resp 30   Ht 1.702 m (5' 7\")   Wt 94.2 kg (207 lb 11.2 oz)   SpO2 95%   BMI 32.53 kg/m      Martell Flores RN on 12/4/2024 at 5:56 AM    " Hpi Title: Evaluation of Skin Lesions How Severe Are Your Spot(S)?: moderate Have Your Spot(S) Been Treated In The Past?: has not been treated

## 2024-12-04 NOTE — PROGRESS NOTES
"PRIMARY DIAGNOSIS: \"GENERIC\" NURSING  OUTPATIENT/OBSERVATION GOALS TO BE MET BEFORE DISCHARGE:  ADLs back to baseline: No    Activity and level of assistance: Up with maximum assistance. Consider SW and/or PT evaluation.     Pain status: Pain free.    Return to near baseline physical activity: No     Discharge Planner Nurse   Safe discharge environment identified: Yes  Barriers to discharge: Yes       Entered by: Martell Flores RN 12/04/2024 5:57 AM     Please review provider order for any additional goals.   Nurse to notify provider when observation goals have been met and patient is ready for discharge.  "

## 2024-12-04 NOTE — PROGRESS NOTES
"PRIMARY DIAGNOSIS: \"GENERIC\" NURSING  OUTPATIENT/OBSERVATION GOALS TO BE MET BEFORE DISCHARGE:  ADLs back to baseline: No    Activity and level of assistance: Up with maximum assistance. Consider SW and/or PT evaluation.     Pain status: Pain free.    Return to near baseline physical activity: No     Discharge Planner Nurse   Safe discharge environment identified: Yes  Barriers to discharge: Yes       Entered by: Martell Flores RN 12/04/2024 5:58 AM     Please review provider order for any additional goals.   Nurse to notify provider when observation goals have been met and patient is ready for discharge.  "

## 2024-12-04 NOTE — PLAN OF CARE
"  Problem: Adult Inpatient Plan of Care  Goal: Plan of Care Review  Description: The Plan of Care Review/Shift note should be completed every shift.  The Outcome Evaluation is a brief statement about your assessment that the patient is improving, declining, or no change.  This information will be displayed automatically on your shift  note.  Outcome: Progressing  Goal: Patient-Specific Goal (Individualized)  Description: You can add care plan individualizations to a care plan. Examples of Individualization might be:  \"Parent requests to be called daily at 9am for status\", \"I have a hard time hearing out of my right ear\", or \"Do not touch me to wake me up as it startles  me\".  Outcome: Progressing  Goal: Absence of Hospital-Acquired Illness or Injury  Outcome: Progressing  Intervention: Prevent Skin Injury  Recent Flowsheet Documentation  Taken 12/4/2024 1500 by Micheal Garcia RN  Body Position: position changed independently  Intervention: Prevent Infection  Recent Flowsheet Documentation  Taken 12/4/2024 0900 by Micheal Garcia RN  Infection Prevention:   rest/sleep promoted   single patient room provided   hand hygiene promoted  Goal: Optimal Comfort and Wellbeing  Outcome: Progressing  Intervention: Monitor Pain and Promote Comfort  Recent Flowsheet Documentation  Taken 12/4/2024 1500 by Micheal Garcia RN  Pain Management Interventions: medication (see MAR)  Taken 12/4/2024 0900 by Micheal Garcia RN  Pain Management Interventions: medication (see MAR)  Goal: Readiness for Transition of Care  Outcome: Progressing  Intervention: Mutually Develop Transition Plan  Recent Flowsheet Documentation  Taken 12/4/2024 0900 by Micheal Garcia RN  Equipment Currently Used at Home:   walker, standard   cane, quad     Problem: Comorbidity Management  Goal: Maintenance of Asthma Control  Outcome: Progressing     Problem: Fall Injury Risk  Goal: Absence of Fall and Fall-Related Injury  Outcome: Progressing   Goal Outcome " Evaluation:    Temp: 98  F (36.7  C) Temp src: Tympanic BP: 111/68 Pulse: 68   Resp: 16 SpO2: 96 % O2 Device: Nasal cannula Oxygen Delivery: 2 LPM    Patient improving per patient statement. No fevers noted, now on RA with sats at 91-94% Up in recliner presently via OT/PT. From A-2 to CGA assist of 1 with walker.

## 2024-12-04 NOTE — PLAN OF CARE
A&Ox4. VSS, afebrile. Denies pain. On room air with Sp02 in the mid 90's. LS diminished with faint crackles throughout. CRAMER/SOB. Frequent, nonproductive cough. Left sided deficit at baseline from stroke in 2022. Generalized weakness on admit - pt able to pivot with 2 person assist with gait belt. Strains to void and experiences hesitancy. Pt is on droplet precautions.    Temp: 98.6  F (37  C) Temp src: Tympanic BP: 137/86 Pulse: 99   Resp: 20 SpO2: 94 % O2 Device: None (Room air)      Sherie Longo RN .......  12/3/2024  6:56 PM      Goal Outcome Evaluation:      Plan of Care Reviewed With: patient    Overall Patient Progress: no changeOverall Patient Progress: no change    Outcome Evaluation: VSS, afebrile, denies pain, left sided deficit at baseline from stroke in 2022

## 2024-12-04 NOTE — PROGRESS NOTES
[facility]    Medicine Progress Note - Hospitalist Service    Date of Admission:  12/3/2024    Assessment & Plan      Ammon Harding is a 74 year old male admitted on 12/3/2024. He has a history of stroke with chronic left hemiplegia. He has acute worsening weakness and tested positive for influenza A.     Principal Problem:      Acute respiratory failure with hypoxia  Assessment: not present on admission, vomited overnight and likely aspirated. Initially requiring 4 liters of oxygen but this is being weaned. Chest xray confirms infiltrate  Plan: zosyn day 1   Wean oxygen as able   As needed nebs      Influenza due to influenza virus, type A, human    Generalized muscle weakness  Assessment: present on admission, onset of symptoms within 48 hrs. Not hypoxic, hemodynamically stable. Infection has exacerbated his chronic stroke deficits and he now needs assistance getting out of bed or a chair.   Plan: Observation   Tamiflu 75 mg BID for day 2 of 5 days   Ambulate   Physical and occupational therapy       Acute stroke due to embolism of basilar artery (H)  Assessment: present on admission, occurred in 2022. Chronic left sided weakness that is exacerbated by the infection. I do not this this is a new stroke.   Plan: continue aspirin, statin   Physical and occupational therapy     Active Problems:    Benign essential hypertension  Assessment: chronic       Acute kidney injury     Chronic kidney disease, stage 2 (mild)  Assessment: present on admission   Plan: check labs in AM      Pulmonary hypertension (H)  Assessment: chronic      Chronic systolic right heart failure (H)  Assessment: no evidence for heart failure exacerbation  Plan: follow intake and output. Patient getting intravenous fluids overnight.           Diet: Regular Diet Adult    DVT Prophylaxis: Pneumatic Compression Devices  Cason Catheter: Not present  Lines: None     Cardiac Monitoring: None  Code Status: Full Code   "    Clinically Significant Risk Factors Present on Admission           # Hypocalcemia: Lowest Ca = 8.2 mg/dL in last 2 days, will monitor and replace as appropriate       # Drug Induced Platelet Defect: home medication list includes an antiplatelet medication   # Hypertension: Noted on problem list           # Obesity: Estimated body mass index is 32.53 kg/m  as calculated from the following:    Height as of this encounter: 1.702 m (5' 7\").    Weight as of this encounter: 94.2 kg (207 lb 11.2 oz).              Social Drivers of Health    Tobacco Use: Medium Risk (12/3/2024)    Patient History     Smoking Tobacco Use: Former     Smokeless Tobacco Use: Never   Alcohol Use: Unknown (5/15/2020)    Received from [facility]    AUDIT-C     Frequency of Alcohol Consumption: 2-3 times a week   Physical Activity: Unknown (5/15/2020)    Received from [facility]    Exercise Vital Sign     Minutes of Exercise per Session: 60 min   Social Connections: Unknown (5/15/2020)    Received from [facility]    Social Connection and Isolation Panel [NHANES]     Frequency of Communication with Friends and Family: Once a week     Frequency of Social Gatherings with Friends and Family: Twice a week     Active Member of Clubs or Organizations: Yes     Attends Club or Organization Meetings: More than 4 times per year          Disposition Plan     Medically Ready for Discharge: Anticipated in 2-4 Days             Manish Calhoun MD  Hospitalist Service  [facility]  Securely message with [employer] (more info)  Text page via [facility] Paging/Directory   ______________________________________________________________________    Interval History   Emesis overnight causing respiratory distress. No fevers, chills. Complains of dyspnea. No chest pain. No new nausea, vomiting     Physical Exam   Vital Signs: Temp: 97.7  F (36.5  C) Temp src: Tympanic BP: 101/67 Pulse: 68   Resp: 30 SpO2: 96 % O2 Device: Nasal cannula Oxygen Delivery: " 2 LPM  Weight: 207 lbs 11.2 oz    GENERAL: Comfortable, no apparent distress.  CARDIOVASCULAR: regular rate and rhythm, NO murmur. No lower extremity edema   RESPIRATORY: Bilateral crackles.  GI: non-tender, non-distended, normal bowel sounds.   SKIN: warm periphery, no rashes      Medical Decision Making       45 MINUTES SPENT BY ME on the date of service doing chart review, history, exam, documentation & further activities per the note.      Data     I have personally reviewed the following data over the past 24 hrs:    12.4 (H)  \   14.6   / 134 (L)     138 104 23.4 (H) /  122 (H)   4.1 24 1.92 (H) \     ALT: 37 AST: 41 AP: 104 TBILI: 0.5   ALB: 3.9 TOT PROTEIN: 7.0 LIPASE: N/A     Procal: N/A CRP: N/A Lactic Acid: 1.0         Imaging results reviewed over the past 24 hrs:   Recent Results (from the past 24 hours)   XR Chest 2 Views    Narrative    PROCEDURE:  XR CHEST 2 VIEWS    HISTORY:  cough, fever; Cough in adult; Fatigue, unspecified type;  Fever in adult.     COMPARISON:  None.    FINDINGS:   The cardiac silhouette is normal in size. The pulmonary vasculature is  normal.  The lungs are clear. No pleural effusion or pneumothorax.      Impression    IMPRESSION:  No acute cardiopulmonary disease.      BLADIMIR ESPINOSA MD         SYSTEM ID:  [id number]   XR Chest Port 1 View    Narrative    PROCEDURE INFORMATION:   Exam: XR Chest   Exam date and time: 12/4/2024 1:11 AM   Age: 74 years old   Clinical indication: Other: Aspiration     TECHNIQUE:   Imaging protocol: Radiologic exam of the chest.   Views: 1 view.     COMPARISON:   No relevant prior studies available.     FINDINGS:   Lungs: Patchy right basilar infiltrate.   Pleural spaces: Unremarkable. No pleural effusion. No pneumothorax.   Heart/Mediastinum: Unremarkable.   Bones/joints: Unremarkable.       Impression    IMPRESSION:   Patchy right basilar infiltrate, suspicious for pneumonia.     THIS DOCUMENT HAS BEEN ELECTRONICALLY SIGNED BY MONICA GERMAIN MD

## 2024-12-04 NOTE — PROGRESS NOTES
12/04/24 7786   Appointment Info   Signing Clinician's Name / Credentials (OT) Karishma Brian, OTR/L   Living Environment   People in Home spouse   Current Living Arrangements house   Home Accessibility no concerns   Transportation Anticipated family or friend will provide   Living Environment Comments Pt lives in 1 level home, wife assist at home   Self-Care   Usual Activity Tolerance moderate   Current Activity Tolerance fair   Equipment Currently Used at Home walker, rolling;tub bench;grab bar, toilet;grab bar, tub/shower;wheelchair, manual   Fall history within last six months yes   Number of times patient has fallen within last six months 1   Activity/Exercise/Self-Care Comment pt reports since having the flu he has had one fall from weakness   Cognitive Status Examination   Orientation Status orientation to person, place and time   Affect/Mental Status (Cognitive) WFL   Follows Commands WFL   Visual Perception   Visual Impairment/Limitations WFL;corrective lenses full-time   Pain Assessment   Patient Currently in Pain No   Range of Motion Comprehensive   General Range of Motion   (limited in left U/E due to previous CVA, has increased tone throuhgout and minimally functional with left hand)   Comment, General Range of Motion right U/E WNL   Coordination   Upper Extremity Coordination Left UE impaired   Bed Mobility   Bed Mobility supine-sit   Supine-Sit [place] (Bed Mobility) minimum assist (75% patient effort);1 person to manage equipment   Transfers   Transfers sit-stand transfer;toilet transfer   Sit-Stand Transfer   Sit-Stand [place] (Transfers) contact guard;1 person to manage equipment   Assistive Device (Sit-Stand Transfers) walker, 4-wheeled   Toilet Transfer   [place] Level (Toilet Transfer) contact guard;1 person to manage equipment   Assistive Device (Toilet Transfer) walker, 4-wheeled   Clinical Impression   Criteria for Skilled Therapeutic Interventions Met (OT) Yes, treatment  indicated   OT Diagnosis Influenza A, previous CVA   Influenced by the following impairments weakness   OT Problem List-Impairments impacting ADL activity tolerance impaired   Assessment of Occupational Performance 1-3 Performance Deficits   Identified Performance Deficits mobility and self care   Planned Therapy Interventions (OT) ADL retraining;progressive activity/exercise   Clinical Decision Making Complexity (OT) problem focused assessment/low complexity   Risk & Benefits of therapy have been explained risks/benefits reviewed   OT Total Evaluation Time   OT Eval, Low Complexity Minutes (23354) 15   OT Goals   Therapy Frequency (OT) Daily   OT Predicted Duration/Target Date for Goal Attainment 12/06/24   OT Goals Hygiene/Grooming;Upper Body Dressing;Upper Body Bathing;Lower Body Bathing;Transfers;Toilet Transfer/Toileting   OT: Hygiene/Grooming supervision/stand-by assist   OT: Upper Body Dressing Supervision/stand-by assist   OT: Upper Body Bathing Supervision/stand-by assist   OT: Lower Body Bathing Moderate assist   OT: Transfer Supervision/stand-by assist   OT: Toilet Transfer/Toileting Moderate assist   Interventions   Interventions Quick Adds Self-Care/Home Management   Self-Care/Home Management   Self-Care/Home Mgmt/ADL, Compensatory, Meal Prep Minutes (16069) 45   Lower Body Dressing Training Assistance maximum assist (25% patient effort)   Lower Body Dressing Training Assistance 1 person assist   [place] Level (Toilet Training) maximum assist (25% patient effort)   Assistance (Toilet Training) 1 person assist   Toilet Training Assistance - Assistive Device   (for patrick care after being incontinent of stool)   OT Discharge Planning   OT Plan progress activity tolerance and ADL's   OT Discharge Recommendation (DC Rec) home with assist;home with home care occupational therapy   OT Rationale for DC Rec Pt wants to return home as previous with assist from family, is currently having outpatient PT at  "[facility]   OT Brief overview of current status Pt very pleasant, motivated ,ambulated with CGA with 4WW. Pt states he has had incresed difficulty at home since \"getting sick\"   OT Equipment Needed at Discharge   (has all necessary equipment needs met)   Total Session Time   Timed Code Treatment Minutes 45   Total Session Time (sum of timed and untimed services) 60     "

## 2024-12-04 NOTE — PROGRESS NOTES
Clinic Care Coordination Contact    Referred to patient by [facility] staff.  Spoke with provider, Mili Jean re: patient's ongoing care giving needs in the home as he is currently fighting Influenza A.  Wife, Pat, brought patient into [facility] to be seen, but is also feeling ill/Influenza and had hurt her back in AM while assisting him up off the floor from a fall.     Initiated a conversation with wife/Pat to discuss her ability to manage patient's ongoing needs in the home.  She reports that they maintain their routine ok but since he's been ill, he is not able to bear weight/use his walker.  His increased dependence on her has been difficult--efrain now that she is ill as well.  There is a daughter in the area but she is not able to commit to consistent care giving.     Pat/wife is hopeful that patient will return home from his current stay at the hospital with short term rehab/therapy in the home.  If patient is not capable of bearing weight, patient might qualify for a short term rehab stay at [facility].  Pat/wife reports that patient has been there before for rehab--- after his stroke 2 years ago.  [facility].     Patient is being admitted to hospital.  Pat/wife is anticipating getting her much needed rest/recovery at home during this time.     Pat/wife agreed to accepting enrollment into Care Coordination and a follow up phone call from Clinic Care Coordination after patient has been discharged from hospital.      Plan:  Enrolled in Care Coordination to assess and offer assist with community resources/referrals; ongoing encouragement, support as needed.     Will follow up with patient/wife in next 7-10 days to check on patient's status and wife's care giving in the home.    KANDICE Baird on 12/4/2024 at 1:39 PM

## 2024-12-04 NOTE — PROGRESS NOTES
:     Patient comes from home with wife. PT/OT will eval to determine discharge needs.     KANDICE Morales on 12/4/2024 at 12:23 PM

## 2024-12-04 NOTE — SIGNIFICANT EVENT
Notified by nurse that patient had an aspiration event and was placed on 2 L supplemental oxygen.  His oxygen requirements have increased and he developed a temperature.  Will check x-ray and started on IV Zosyn.

## 2024-12-04 NOTE — PROGRESS NOTES
12/04/24 1400   Appointment Info   Signing Clinician's Name / Credentials (PT) Volodymyr Curtis DPT   Rehab Comments (PT) Pt is seen in hospital after having a fall and weakness at home. Pt needing assist at greater levels than normal. Pt baseline is ambulatory with 4WW   Living Environment   People in Home spouse   Current Living Arrangements house   Home Accessibility no concerns   Transportation Anticipated family or friend will provide   Self-Care   Usual Activity Tolerance moderate   Current Activity Tolerance fair   Equipment Currently Used at Home walker, rolling;tub bench;grab bar, toilet;grab bar, tub/shower;wheelchair, manual   Fall history within last six months yes   Number of times patient has fallen within last six months 1   General Information   Onset of Illness/Injury or Date of Surgery 12/03/24   Referring Physician Dr. Calhoun   Patient/Family Therapy Goals Statement (PT) return home   General Observations Pt alert and willing to work with PT. Ammon notes he had a previous stroke that has limited his left side primarily his hand and shoulder. He notes he is still able to use a walker but has difficulty navigating the brakes.   Cognition   Affect/Mental Status (Cognition) WFL   Orientation Status (Cognition) oriented x 4   Follows Commands (Cognition) WFL;follows multi-step commands;over 90% accuracy   Cognitive Status Comments No deficits identified   Pain Assessment   Patient Currently in Pain No   Posture    Posture Comments L UE flexed elbow and fingers.   Range of Motion (ROM)   ROM Comment increased tone and contractures at L hand and elbow.   Strength (Manual Muscle Testing)   Strength Comments Weakness on left side from previous stroke. Pt notes this is unchanged. Decreased endurance and mild amount of leg weakness due to recent illness.   Bed Mobility   Bed Mobility Limitations decreased ability to use arms for pushing/pulling   Impairments Contributing to Impaired Bed Mobility decreased  strength   Assistive Device (Bed Mobility) bed rails   Comment, (Bed Mobility) Near SBA complete but brief assist for helping slide hips foward to get sitting EOB.   Transfers   Transfers sit-stand transfer;toilet transfer   Sit-Stand Transfer   Sit-Stand Angelina (Transfers) contact guard;1 person assist   Assistive Device (Sit-Stand Transfers) walker, 4-wheeled   Toilet Transfer   Angel[place] Level (Toilet Transfer) contact guard;1 person assist   Assistive Device (Toilet Transfer) walker, 4-wheeled   Type (Toilet Transfer) sit-stand;stand-sit   Gait/Stairs (Locomotion)   Angelina Level (Gait) contact guard;1 person assist   Assistive Device (Gait) walker, 4-wheeled   Distance in Feet (Gait) 2x20   Pattern (Gait) 2-point   Deviations/Abnormal Patterns (Gait) left sided deviations;gait speed decreased   Left Sided Gait Deviations foot drop/toe drag   Maintains Weight-bearing Status (Gait) able to maintain   Balance   Balance Comments good dynamic balance with ambulation and 4WW use   Clinical Impression   Criteria for Skilled Therapeutic Intervention Yes, treatment indicated   PT Diagnosis (PT) impaired mobility   Influenced by the following impairments weakness, poor endurance, ROM Loss   Functional limitations due to impairments poor activity tolerance, decreased ambulation distance, impaired transfers.   Clinical Presentation (PT Evaluation Complexity) evolving   Clinical Decision Making (Complexity) low complexity   Planned Therapy Interventions (PT) bed mobility training;gait training;transfer training;stair training   Risk & Benefits of therapy have been explained evaluation/treatment results reviewed;patient   PT Total Evaluation Time   PT Ye, Low Complexity Minutes (53727) 15   Physical Therapy Goals   PT Frequency Daily   PT Predicted Duration/Target Date for Goal Attainment 12/08/24   PT Goals Bed Mobility;Transfers;Gait   PT: Bed Mobility Supervision/stand-by assist   PT: Transfers  Supervision/stand-by assist   PT: Gait Rolling walker;100 feet   Interventions   Interventions Quick Adds Therapeutic Activity   Therapeutic Activity   Therapeutic Activities: dynamic activities to improve functional performance Minutes (24462) 30   Symptoms Noted During/After Treatment Fatigue   Treatment Detail/Skilled Intervention Bed mobility with MIN A, sit to stand throughout session with CGA/MIN A at times. 4WW use for balance and mobility. Pt having BM once standing bedside. MAX A for cleanup from OT. Ambulation to bathroom with MOD A for cares once there. Pt to bedside chair at end of session. Ambulated 40 feet in total.   PT Discharge Planning   PT Plan Continue PT   PT Discharge Recommendation (DC Rec) Transitional Care Facility;home with home care physical therapy   PT Rationale for DC Rec Pt is gradually improving. Pt may benefit from skilled nursing facility but pending progress, home with homecare may be an option.   PT Brief overview of current status MIN A for bed mobility, transfers with CGA or MIN A at times. Short distance ambulation in room with 4WW with fair stability. No loss of balance noted.   Physical Therapy Time and Intention   Timed Code Treatment Minutes 30   Total Session Time (sum of timed and untimed services) 45

## 2024-12-05 ENCOUNTER — APPOINTMENT (OUTPATIENT)
Dept: PHYSICAL THERAPY | Facility: OTHER | Age: 74
End: 2024-12-05
Attending: INTERNAL MEDICINE
Payer: MEDICARE

## 2024-12-05 ENCOUNTER — APPOINTMENT (OUTPATIENT)
Dept: OCCUPATIONAL THERAPY | Facility: OTHER | Age: 74
End: 2024-12-05
Attending: INTERNAL MEDICINE
Payer: MEDICARE

## 2024-12-05 VITALS
SYSTOLIC BLOOD PRESSURE: 141 MMHG | WEIGHT: 204.4 LBS | RESPIRATION RATE: 16 BRPM | TEMPERATURE: 98.9 F | OXYGEN SATURATION: 95 % | DIASTOLIC BLOOD PRESSURE: 78 MMHG | BODY MASS INDEX: 32.08 KG/M2 | HEART RATE: 74 BPM | HEIGHT: 67 IN

## 2024-12-05 LAB
ANION GAP SERPL CALCULATED.3IONS-SCNC: 10 MMOL/L (ref 7–15)
BUN SERPL-MCNC: 24.7 MG/DL (ref 8–23)
CALCIUM SERPL-MCNC: 8 MG/DL (ref 8.8–10.4)
CHLORIDE SERPL-SCNC: 110 MMOL/L (ref 98–107)
CREAT SERPL-MCNC: 1.48 MG/DL (ref 0.67–1.17)
EGFRCR SERPLBLD CKD-EPI 2021: 49 ML/MIN/1.73M2
ERYTHROCYTE [DISTWIDTH] IN BLOOD BY AUTOMATED COUNT: 13.1 % (ref 10–15)
GLUCOSE SERPL-MCNC: 93 MG/DL (ref 70–99)
HCO3 SERPL-SCNC: 22 MMOL/L (ref 22–29)
HCT VFR BLD AUTO: 42.6 % (ref 40–53)
HGB BLD-MCNC: 14 G/DL (ref 13.3–17.7)
MCH RBC QN AUTO: 31.2 PG (ref 26.5–33)
MCHC RBC AUTO-ENTMCNC: 32.9 G/DL (ref 31.5–36.5)
MCV RBC AUTO: 95 FL (ref 78–100)
PLATELET # BLD AUTO: 120 10E3/UL (ref 150–450)
POTASSIUM SERPL-SCNC: 3.7 MMOL/L (ref 3.4–5.3)
RBC # BLD AUTO: 4.49 10E6/UL (ref 4.4–5.9)
SODIUM SERPL-SCNC: 142 MMOL/L (ref 135–145)
WBC # BLD AUTO: 7.2 10E3/UL (ref 4–11)

## 2024-12-05 PROCEDURE — 94640 AIRWAY INHALATION TREATMENT: CPT | Mod: 76

## 2024-12-05 PROCEDURE — 97116 GAIT TRAINING THERAPY: CPT | Mod: GP

## 2024-12-05 PROCEDURE — 250N000009 HC RX 250: Performed by: INTERNAL MEDICINE

## 2024-12-05 PROCEDURE — 94640 AIRWAY INHALATION TREATMENT: CPT

## 2024-12-05 PROCEDURE — 97530 THERAPEUTIC ACTIVITIES: CPT | Mod: GP

## 2024-12-05 PROCEDURE — 85014 HEMATOCRIT: CPT | Performed by: INTERNAL MEDICINE

## 2024-12-05 PROCEDURE — 85041 AUTOMATED RBC COUNT: CPT | Performed by: INTERNAL MEDICINE

## 2024-12-05 PROCEDURE — 97530 THERAPEUTIC ACTIVITIES: CPT | Mod: GO | Performed by: OCCUPATIONAL THERAPIST

## 2024-12-05 PROCEDURE — 120N000001 HC R&B MED SURG/OB

## 2024-12-05 PROCEDURE — 250N000011 HC RX IP 250 OP 636: Performed by: INTERNAL MEDICINE

## 2024-12-05 PROCEDURE — 258N000003 HC RX IP 258 OP 636: Performed by: INTERNAL MEDICINE

## 2024-12-05 PROCEDURE — 99232 SBSQ HOSP IP/OBS MODERATE 35: CPT | Performed by: INTERNAL MEDICINE

## 2024-12-05 PROCEDURE — 80048 BASIC METABOLIC PNL TOTAL CA: CPT | Performed by: INTERNAL MEDICINE

## 2024-12-05 PROCEDURE — 36415 COLL VENOUS BLD VENIPUNCTURE: CPT | Performed by: INTERNAL MEDICINE

## 2024-12-05 PROCEDURE — 999N000157 HC STATISTIC RCP TIME EA 10 MIN

## 2024-12-05 PROCEDURE — 250N000013 HC RX MED GY IP 250 OP 250 PS 637: Performed by: INTERNAL MEDICINE

## 2024-12-05 RX ORDER — PIPERACILLIN SODIUM, TAZOBACTAM SODIUM 4; .5 G/20ML; G/20ML
4.5 INJECTION, POWDER, LYOPHILIZED, FOR SOLUTION INTRAVENOUS EVERY 6 HOURS
Status: DISCONTINUED | OUTPATIENT
Start: 2024-12-05 | End: 2024-12-06 | Stop reason: HOSPADM

## 2024-12-05 RX ADMIN — FINASTERIDE 5 MG: 5 TABLET, FILM COATED ORAL at 21:36

## 2024-12-05 RX ADMIN — BACLOFEN 10 MG: 10 TABLET ORAL at 06:43

## 2024-12-05 RX ADMIN — PIPERACILLIN SODIUM AND TAZOBACTAM SODIUM 4.5 G: 4; .5 INJECTION, POWDER, LYOPHILIZED, FOR SOLUTION INTRAVENOUS at 12:00

## 2024-12-05 RX ADMIN — PIPERACILLIN AND TAZOBACTAM 3.38 G: 3; .375 INJECTION, POWDER, LYOPHILIZED, FOR SOLUTION INTRAVENOUS at 01:07

## 2024-12-05 RX ADMIN — OSELTAMIVIR PHOSPHATE 30 MG: 30 CAPSULE ORAL at 21:36

## 2024-12-05 RX ADMIN — PANTOPRAZOLE SODIUM 40 MG: 40 TABLET, DELAYED RELEASE ORAL at 06:43

## 2024-12-05 RX ADMIN — ATORVASTATIN CALCIUM 80 MG: 40 TABLET, FILM COATED ORAL at 21:36

## 2024-12-05 RX ADMIN — BACLOFEN 10 MG: 10 TABLET ORAL at 14:04

## 2024-12-05 RX ADMIN — PIPERACILLIN SODIUM AND TAZOBACTAM SODIUM 4.5 G: 4; .5 INJECTION, POWDER, LYOPHILIZED, FOR SOLUTION INTRAVENOUS at 18:25

## 2024-12-05 RX ADMIN — ASPIRIN 81 MG: 81 TABLET, COATED ORAL at 09:25

## 2024-12-05 RX ADMIN — IPRATROPIUM BROMIDE AND ALBUTEROL SULFATE 3 ML: .5; 3 SOLUTION RESPIRATORY (INHALATION) at 04:16

## 2024-12-05 RX ADMIN — OSELTAMIVIR PHOSPHATE 30 MG: 30 CAPSULE ORAL at 09:26

## 2024-12-05 RX ADMIN — BACLOFEN 10 MG: 10 TABLET ORAL at 21:36

## 2024-12-05 RX ADMIN — PIPERACILLIN AND TAZOBACTAM 3.38 G: 3; .375 INJECTION, POWDER, LYOPHILIZED, FOR SOLUTION INTRAVENOUS at 06:40

## 2024-12-05 RX ADMIN — IPRATROPIUM BROMIDE AND ALBUTEROL SULFATE 3 ML: .5; 3 SOLUTION RESPIRATORY (INHALATION) at 19:12

## 2024-12-05 RX ADMIN — SODIUM CHLORIDE: 9 INJECTION, SOLUTION INTRAVENOUS at 07:41

## 2024-12-05 ASSESSMENT — ACTIVITIES OF DAILY LIVING (ADL)
ADLS_ACUITY_SCORE: 71
ADLS_ACUITY_SCORE: 72
ADLS_ACUITY_SCORE: 62
ADLS_ACUITY_SCORE: 62
ADLS_ACUITY_SCORE: 68
ADLS_ACUITY_SCORE: 72
ADLS_ACUITY_SCORE: 71
ADLS_ACUITY_SCORE: 71
ADLS_ACUITY_SCORE: 68
ADLS_ACUITY_SCORE: 70
ADLS_ACUITY_SCORE: 72
ADLS_ACUITY_SCORE: 71
ADLS_ACUITY_SCORE: 72
ADLS_ACUITY_SCORE: 62
ADLS_ACUITY_SCORE: 72
ADLS_ACUITY_SCORE: 71
ADLS_ACUITY_SCORE: 70
ADLS_ACUITY_SCORE: 71
ADLS_ACUITY_SCORE: 71
ADLS_ACUITY_SCORE: 72
ADLS_ACUITY_SCORE: 72

## 2024-12-05 NOTE — PROGRESS NOTES
Patient remains on room air.  1 PRN neb given this shift.  Breath sounds coarse with a loose, nonproductive cough.

## 2024-12-05 NOTE — PLAN OF CARE
"Goal Outcome Evaluation:    VSS, A&O and pleasant. Afebrile, Denies pain, No nausea/vomiting. Audible inspo and exspo wheezes throughout. Non productive congested cough. Pt on RA, SPO2 remains in the 90's. All questions and concerns addressed.     BP (!) 149/77 (BP Location: Left arm, Patient Position: Semi-Ramos's, Cuff Size: Adult Regular)   Pulse 92   Temp 98.8  F (37.1  C) (Tympanic)   Resp 18   Ht 1.702 m (5' 7\")   Wt 94.2 kg (207 lb 11.2 oz)   SpO2 92%   BMI 32.53 kg/m          Plan of Care Reviewed With: patient    Overall Patient Progress: improving    Outcome Evaluation: VSS, Afebrile, Denies Pain, On RA    Meagan Donaldson RN on 12/5/2024 at 1:58 AM    "

## 2024-12-05 NOTE — PROGRESS NOTES
[facility]    Medicine Progress Note - Hospitalist Service    Date of Admission:  12/3/2024    Assessment & Plan   Ammon Harding is a 74 year old male admitted on 12/3/2024. He has a history of stroke with chronic left hemiplegia. He has acute worsening weakness and tested positive for influenza A.     Principal Problem:      Acute respiratory failure with hypoxia  Assessment: not present on admission, vomited overnight and likely aspirated. Initially requiring 4 liters of oxygen but this is being weaned. Chest xray confirms infiltrate. Improving. Off oxygen.   Plan: zosyn day 2, transition to augmentin tomorrow.   As needed nebs      Influenza due to influenza virus, type A, human    Generalized muscle weakness  Assessment: present on admission, onset of symptoms within 48 hrs. Not hypoxic, hemodynamically stable. Infection has exacerbated his chronic stroke deficits and he now needs assistance getting out of bed or a chair.   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 Chronic left sided weakness that is exacerbated by the infection. I do not this this is a new stroke.   Plan: continue aspirin, statin   Physical and occupational therapy     Active Problems:    Benign essential hypertension  Assessment: chronic       Acute kidney injury     Chronic kidney disease, stage 2 (mild)  Assessment: present on admission, improved overnight  Plan: check labs in AM   Stop intravenous fluids       Pulmonary hypertension (H)  Assessment: chronic      Chronic systolic right heart failure (H)  Assessment: no evidence for heart failure exacerbation  Plan: follow intake and output.           Diet: Regular Diet Adult    DVT Prophylaxis: Pneumatic Compression Devices  Cason Catheter: Not present  Lines: None     Cardiac Monitoring:  "None  Code Status: Full Code      Clinically Significant Risk Factors          # Hyperchloremia: Highest Cl = 110 mmol/L in last 2 days, will monitor as appropriate      # Hypocalcemia: Lowest Ca = 8 mg/dL in last 2 days, will monitor and replace as appropriate       # Thrombocytopenia: Lowest platelets = 120 in last 2 days, will monitor for bleeding   # Hypertension: Noted on problem list            # Obesity: Estimated body mass index is 32.01 kg/m  as calculated from the following:    Height as of this encounter: 1.702 m (5' 7\").    Weight as of this encounter: 92.7 kg (204 lb 6.4 oz)., PRESENT ON ADMISSION            Social Drivers of Health    Tobacco Use: Medium Risk (12/3/2024)    Patient History     Smoking Tobacco Use: Former     Smokeless Tobacco Use: Never   Alcohol Use: Unknown (5/15/2020)    Received from [facility]    AUDIT-C     Frequency of Alcohol Consumption: 2-3 times a week   Physical Activity: Unknown (5/15/2020)    Received from [facility]    Exercise Vital Sign     Minutes of Exercise per Session: 60 min   Social Connections: Unknown (5/15/2020)    Received from [facility]    Social Connection and Isolation Panel [NHANES]     Frequency of Communication with Friends and Family: Once a week     Frequency of Social Gatherings with Friends and Family: Twice a week     Active Member of Clubs or Organizations: Yes     Attends Club or Organization Meetings: More than 4 times per year          Disposition Plan     Medically Ready for Discharge: Anticipated Tomorrow             Manish Calhoun MD  Hospitalist Service  [facility]  Securely message with [email or web address] (more info)  Text page via [facility] Paging/Directory   ______________________________________________________________________    Interval History   Feeling better. No nausea, vomiting. No dyspnea. Moving around better.     Physical Exam   Vital Signs: Temp: 99  F (37.2  C) Temp src: Tympanic BP: 133/78 Pulse: 84   Resp: 18 " SpO2: 93 % O2 Device: None (Room air) Oxygen Delivery: 2 LPM  Weight: 204 lbs 6.4 oz    GENERAL: Comfortable, no apparent distress.  CARDIOVASCULAR: regular rate and rhythm, no murmur. No lower extremity edema   RESPIRATORY: Clear to auscultation bilaterally, no wheezes or crackles.  GI: non-tender, non-distended, normal bowel sounds.   SKIN: warm periphery, no rashes      Medical Decision Making       35 MINUTES SPENT BY ME on the date of service doing chart review, history, exam, documentation & further activities per the note.      Data     I have personally reviewed the following data over the past 24 hrs:    7.2  \   14.0   / 120 (L)     142 110 (H) 24.7 (H) /  93   3.7 22 1.48 (H) \

## 2024-12-05 NOTE — PROGRESS NOTES
Interdisciplinary Discharge Planning Note    Anticipated Discharge Date: 12/6-12/7    Anticipated Discharge Location: Home    Clinical Needs Before Discharge:  stable functional status    Treatment Needs After Discharge:   Outpatient PT    Potential Barriers to Discharge: None identified     KANDICE Morales  12/5/2024,  12:24 PM

## 2024-12-05 NOTE — PLAN OF CARE
"  Problem: Adult Inpatient Plan of Care  Goal: Plan of Care Review  Description: The Plan of Care Review/Shift note should be completed every shift.  The Outcome Evaluation is a brief statement about your assessment that the patient is improving, declining, or no change.  This information will be displayed automatically on your shift  note.  Outcome: Progressing  Goal: Patient-Specific Goal (Individualized)  Description: You can add care plan individualizations to a care plan. Examples of Individualization might be:  \"Parent requests to be called daily at 9am for status\", \"I have a hard time hearing out of my right ear\", or \"Do not touch me to wake me up as it startles  me\".  Outcome: Progressing  Goal: Absence of Hospital-Acquired Illness or Injury  Outcome: Progressing  Intervention: Prevent Infection  Recent Flowsheet Documentation  Taken 12/5/2024 0803 by Micheal Garcia RN  Infection Prevention:   rest/sleep promoted   single patient room provided   hand hygiene promoted  Goal: Optimal Comfort and Wellbeing  Outcome: Progressing  Goal: Readiness for Transition of Care  Outcome: Progressing     Problem: Comorbidity Management  Goal: Maintenance of Asthma Control  Outcome: Progressing     Problem: Fall Injury Risk  Goal: Absence of Fall and Fall-Related Injury  Outcome: Progressing   Goal Outcome Evaluation:    Temp: 99  F (37.2  C) Temp src: Tympanic BP: 133/78 Pulse: 84   Resp: 18 SpO2: 93 % O2 Device: None (Room air) Oxygen Delivery: 2 LPM    Patient had comfortable noc and AM. Had minor concerns with SOB overnight but sats maintained in low 90's on room air. Per patient statement, has been feeling better with continuing loose cough and no fever present. Has been urinating more with IV fluids running; last BM on 12/4. Very pleasant demeanor, denying pain. Awaiting MD for POC update.                         "

## 2024-12-05 NOTE — PROGRESS NOTES
12/05/24 1400   Appointment Info   Signing Clinician's Name / Credentials (PT) Volodymyr Curtis DPT   Physical Therapy Goals   PT Frequency Daily   PT Predicted Duration/Target Date for Goal Attainment 12/08/24   PT Goals Bed Mobility;Transfers;Gait   PT: Bed Mobility Supervision/stand-by assist   PT: Transfers Supervision/stand-by assist   PT: Gait Rolling walker;100 feet   Interventions   Interventions Quick Adds Therapeutic Activity;Gait Training   Therapeutic Activity   Therapeutic Activities: dynamic activities to improve functional performance Minutes (70168) 15   Symptoms Noted During/After Treatment Fatigue   Treatment Detail/Skilled Intervention SBA for bed mobility today. Ammon using a bed rail to rise to sitting at EOB. Able to scoot self to edge. Sit to stand throughout session from various support surfaces with CGA.   Gait Training   Gait Training Minutes (38527) 15   Symptoms Noted During/After Treatment (Gait Training) fatigue;shortness of breath   Treatment Detail/Skilled Intervention Ambulation in hallway. Seated rest break. Utilized a FWW with L platform as pt was having difficulty with a 4ww pinching his left hand. We tried with platform. Pt did well but thought just the FWW would be fine.   Distance in Feet 2x75   [place] Level (Gait Training) contact guard   Physical Assistance Level (Gait Training) 1 person assist   Weight Bearing (Gait Training) full weight-bearing   Assistive Device (Gait Training) rolling walker  (L platform)   Pattern Analysis (Gait Training) 3-point gait   Gait Analysis Deviations decreased danny;decreased stride length;increased stride width   Impairments (Gait Analysis/Training) strength decreased   PT Discharge Planning   PT Plan Continue PT   PT Discharge Recommendation (DC Rec) Transitional Care Facility;home with home care physical therapy   PT Rationale for DC Rec Pt continues to make strides towards improvement. Hope is to get pt independent with mobility  to make sure he is safe at home.   PT Brief overview of current status CGA today for all activities. Ambulating greater distance today as well. SOB at end of ambulation but O2 sats at 96%.   Physical Therapy Time and Intention   Timed Code Treatment Minutes 30   Total Session Time (sum of timed and untimed services) 30

## 2024-12-05 NOTE — PROGRESS NOTES
12/05/24 9636   Appointment Info   Signing Clinician's Name / Credentials (OT) Karishma Brian, OTR/L   Interventions   Interventions Quick Adds Therapeutic Activity   Therapeutic Activities   Therapeutic Activity Minutes (80588) 45   Symptoms noted during/after treatment fatigue   Treatment Detail/Skilled Intervention Pt was very pleasant, agreeable to get up and work with therapy, pt was provided with a platform walker to use for ease with tone in left hand. Pt moved supine to sit with SBA only, and ambulated in hallway using platform walker with CGA of 1. Pt was fatigued after 75 feet and took a rest break, then ambulated back to room. Pt has some SOB after activity but tolerated mostly well. Pt's wife present and discussed prior level of function and level of independence needed at home.   OT Discharge Planning   OT Plan progress activity tolerance and ADL's   OT Discharge Recommendation (DC Rec) home with assist;home with home care occupational therapy   OT Rationale for DC Rec Pt wants to return home as previous with assist from family, is currently having outpatient PT at [facility]   OT Brief overview of current status Pt progressing well, tolerated increased activity today and ambulated greater distance with platform walker with CGA   Total Session Time   Timed Code Treatment Minutes 45   Total Session Time (sum of timed and untimed services) 45

## 2024-12-05 NOTE — PROGRESS NOTES
SAFETY CHECKLIST  ID Bands and Risk clasps correct and in place (DNR, Fall risk, Allergy, Latex, Limb):  Yes  All Lines Reconciled and labeled correctly: Yes  Whiteboard updated:Yes  Environmental interventions: Yes  Verify Tele #: JUAN Donaldson RN on 12/4/2024 at 7:10 PM

## 2024-12-05 NOTE — PLAN OF CARE
"Goal Outcome Evaluation:    VSS except slightly elevated BP. Afebrile. LS have exp wheezes. 92% on RA. Frequent loose cough.  Up walking halls with OT/PT. Assist of 1-2 with walker.     BP (!) 153/84 (BP Location: Right arm, Patient Position: Semi-Ramos's, Cuff Size: Adult Regular)   Pulse 74   Temp 97.9  F (36.6  C) (Tympanic)   Resp 18   Ht 1.702 m (5' 7\")   Wt 92.7 kg (204 lb 6.4 oz)   SpO2 92%   BMI 32.01 kg/m          Plan of Care Reviewed With: patient    Overall Patient Progress: improvingOverall Patient Progress: improving    Outcome Evaluation: VSS. Afebrile. Denies pain. On RA.      "

## 2024-12-05 NOTE — PROGRESS NOTES
:     Per discharge planning patient is anticipated to discharge home in 1-2 days. Patient will continue with his outpatient PT.      will continue to follow for discharge planning needs.     KANDICE Morales on 12/5/2024 at 12:22 PM

## 2024-12-05 NOTE — PHARMACY
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)    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)    Oropharyngeal dysphagia    Chronic left shoulder pain    Pulmonary hypertension (H)    Chronic systolic right heart failure (H)    Pipe smoker    Influenza due to influenza virus, type A, human    Generalized muscle weakness    Influenza A    Aspiration pneumonia (H)    Acute respiratory failure with hypoxia (H)        Relevant Labs:  Recent Labs   Lab Test 12/05/24  0532 12/04/24  0701   WBC 7.2 12.4*   HGB 14.0 14.6   * 134*        CrCl: 46.8 mL/min      Intake/Output Summary (Last 24 hours) at 12/5/2024 0813  Last data filed at 12/5/2024 0547  Gross per 24 hour   Intake 660 ml   Output 1200 ml   Net -540 ml          Per Renal Dose Adjustment Protocol, will adjust:  -Pip/tazo to 4.5 g Q6H (from 3.375 g Q6H) due to CrCl now > 40 mL/min, patient weight > 90 kg (indication: aspiration pneumonia).      Will continue to follow and make adjustments accordingly. Thank You.    Bob Carroll [facility] ....................  12/5/2024   8:13 AM

## 2024-12-06 ENCOUNTER — APPOINTMENT (OUTPATIENT)
Dept: PHYSICAL THERAPY | Facility: OTHER | Age: 74
End: 2024-12-06
Attending: INTERNAL MEDICINE
Payer: MEDICARE

## 2024-12-06 ENCOUNTER — APPOINTMENT (OUTPATIENT)
Dept: OCCUPATIONAL THERAPY | Facility: OTHER | Age: 74
End: 2024-12-06
Attending: INTERNAL MEDICINE
Payer: MEDICARE

## 2024-12-06 VITALS
TEMPERATURE: 97.6 F | BODY MASS INDEX: 31.99 KG/M2 | DIASTOLIC BLOOD PRESSURE: 83 MMHG | SYSTOLIC BLOOD PRESSURE: 169 MMHG | WEIGHT: 203.8 LBS | HEART RATE: 74 BPM | OXYGEN SATURATION: 97 % | RESPIRATION RATE: 18 BRPM | HEIGHT: 67 IN

## 2024-12-06 PROCEDURE — 97535 SELF CARE MNGMENT TRAINING: CPT | Mod: GO | Performed by: OCCUPATIONAL THERAPIST

## 2024-12-06 PROCEDURE — 97116 GAIT TRAINING THERAPY: CPT | Mod: GP

## 2024-12-06 PROCEDURE — 97530 THERAPEUTIC ACTIVITIES: CPT | Mod: GP

## 2024-12-06 PROCEDURE — 250N000011 HC RX IP 250 OP 636: Performed by: INTERNAL MEDICINE

## 2024-12-06 PROCEDURE — 250N000013 HC RX MED GY IP 250 OP 250 PS 637: Performed by: INTERNAL MEDICINE

## 2024-12-06 PROCEDURE — 99239 HOSP IP/OBS DSCHRG MGMT >30: CPT | Performed by: INTERNAL MEDICINE

## 2024-12-06 RX ORDER — OSELTAMIVIR PHOSPHATE 30 MG/1
30 CAPSULE ORAL 2 TIMES DAILY
Qty: 4 CAPSULE | Refills: 0 | Status: SHIPPED | OUTPATIENT
Start: 2024-12-06 | End: 2024-12-08

## 2024-12-06 RX ADMIN — PIPERACILLIN SODIUM AND TAZOBACTAM SODIUM 4.5 G: 4; .5 INJECTION, POWDER, LYOPHILIZED, FOR SOLUTION INTRAVENOUS at 06:36

## 2024-12-06 RX ADMIN — OSELTAMIVIR PHOSPHATE 30 MG: 30 CAPSULE ORAL at 09:35

## 2024-12-06 RX ADMIN — BACLOFEN 10 MG: 10 TABLET ORAL at 13:27

## 2024-12-06 RX ADMIN — PIPERACILLIN SODIUM AND TAZOBACTAM SODIUM 4.5 G: 4; .5 INJECTION, POWDER, LYOPHILIZED, FOR SOLUTION INTRAVENOUS at 00:59

## 2024-12-06 RX ADMIN — BACLOFEN 10 MG: 10 TABLET ORAL at 06:36

## 2024-12-06 RX ADMIN — PIPERACILLIN SODIUM AND TAZOBACTAM SODIUM 4.5 G: 4; .5 INJECTION, POWDER, LYOPHILIZED, FOR SOLUTION INTRAVENOUS at 12:08

## 2024-12-06 RX ADMIN — PANTOPRAZOLE SODIUM 40 MG: 40 TABLET, DELAYED RELEASE ORAL at 06:36

## 2024-12-06 RX ADMIN — ASPIRIN 81 MG: 81 TABLET, COATED ORAL at 09:35

## 2024-12-06 ASSESSMENT — ACTIVITIES OF DAILY LIVING (ADL)
ADLS_ACUITY_SCORE: 67
ADLS_ACUITY_SCORE: 60
ADLS_ACUITY_SCORE: 67
ADLS_ACUITY_SCORE: 62
ADLS_ACUITY_SCORE: 67
ADLS_ACUITY_SCORE: 60
ADLS_ACUITY_SCORE: 67
ADLS_ACUITY_SCORE: 60
ADLS_ACUITY_SCORE: 67
ADLS_ACUITY_SCORE: 60
ADLS_ACUITY_SCORE: 60

## 2024-12-06 NOTE — PLAN OF CARE
"Goal Outcome Evaluation:    VSS, with the exception of the BP was slightly elevated. Audible inspo and exspo wheezes. PRN neb given at the beginning of the shift. Afebrile, Denies pain, Slept well between cares, all questions and concerns addressed     BP (!) 165/88 (BP Location: Left arm, Patient Position: Semi-Ramos's, Cuff Size: Adult Regular)   Pulse 71   Temp 97.9  F (36.6  C) (Tympanic)   Resp 18   Ht 1.702 m (5' 7\")   Wt 92.7 kg (204 lb 6.4 oz)   SpO2 94%   BMI 32.01 kg/m          Plan of Care Reviewed With: patient    Overall Patient Progress: improving       Meagan Donaldson RN on 12/6/2024 at 3:51 AM      "

## 2024-12-06 NOTE — PROGRESS NOTES
12/06/24 6022   Appointment Info   Signing Clinician's Name / Credentials (OT) Karishma Brian, OTR/L   Self-Care/Home Management   Self-Care/Home Mgmt/ADL, Compensatory, Meal Prep Minutes (58341) 55   [place] Level (Grooming Training) minimum assist (75% patient effort)   Assistance (Grooming Training) supervision;set-up required   [place] Level (Bathing Training) moderate assist (50% patient effort)   Assistance (Bathing Training) 1 person assist   Assistive Device (Bathing Training) detachable shower head;tub seat with back   [place] Level (Upper Body Dressing Training) minimum assist (75% patient effort)   Assistance (Upper Body Dressing Training) 1 person assist   Lower Body Dressing Training Assistance minimum assist (75% patient effort)   Lower Body Dressing Training Assistance 1 person assist   OT Discharge Planning   OT Plan d/c home today with spouse   OT Discharge Recommendation (DC Rec) home with assist  (pt currently has outpatient PT which he will resume)   OT Rationale for DC Rec Pt has assist at home as needed for self cares   OT Brief overview of current status pt has progressed very well, reports he is near or back to his baseline function, pt completed full shower today with assist, able to ambualte to and from shower with CGA only for safety   OT Equipment Needed at Discharge   (pt has all necessary equipment needs met)   Total Session Time   Timed Code Treatment Minutes 55   Total Session Time (sum of timed and untimed services) 55

## 2024-12-06 NOTE — PLAN OF CARE
"Goal Outcome Evaluation:  BP (!) 169/83 (BP Location: Right arm, Patient Position: Fowlers, Cuff Size: Adult Regular)   Pulse 74   Temp 97.6  F (36.4  C) (Tympanic)   Resp 18   Ht 1.702 m (5' 7\")   Wt 92.4 kg (203 lb 12.8 oz)   SpO2 97%   BMI 31.92 kg/m   Patient states he is feeling up to going home soon, states he is feeling a lot better. Up to bathroom with CGA with walker. Previous left sided deficits to hand and leg. Patient needed some help getting left hand grasped onto walker. Patient Is on room air, lung sounds coarse wheezy throughout.                         "

## 2024-12-06 NOTE — PHARMACY - DISCHARGE MEDICATION RECONCILIATION AND EDUCATION
Pharmacy:  Discharge Counseling and Medication Reconciliation    Ammon Harding  [address] [id number] [phone] (home)   74 year old male  PCP: Av Duncan    Allergies: Patient has no known allergies.    Discharge Counseling:    Pharmacist met with patient (and/or family) today to review the medication portion of the After Visit Summary (with an emphasis on NEW medications) and to address patient's questions/concerns.    Summary of Education: Counseled patient on new medications of Oseltamivir and Augmentin, including indication, administration, and possible common side effects. Advised patient to take medications with food to prevent nausea, also counseled on potential for loose stool.    Materials Provided:  MedCounselor sheets printed from Clinical Pharmacology on: Augmentin, Tamiflu    Discharge Medication Reconciliation:    It has been determined that the patient has an adequate supply of medications available or which can be obtained from the patient's preferred pharmacy, which he has confirmed as: [facility].    Thank you for the consult.    Bob Carroll [facility]........December 6, 2024 1:42 PM

## 2024-12-06 NOTE — DISCHARGE SUMMARY
"[facility]  Hospitalist Discharge Summary      Date of Admission:  12/3/2024  Date of Discharge:  12/6/2024  Discharging Provider: Manish Calhoun MD  Discharge Service: Hospitalist Service    Discharge Diagnoses   Principal Problem:    Influenza due to influenza virus, type A, human  Active Problems:    Benign essential hypertension    Chronic kidney disease, stage 2 (mild)    Acute stroke due to embolism of basilar artery (H)    Pulmonary hypertension (H)    Chronic systolic right heart failure (H)    Generalized muscle weakness    Aspiration pneumonia (H)    Acute respiratory failure with hypoxia (H)       Clinically Significant Risk Factors     # Obesity: Estimated body mass index is 31.92 kg/m  as calculated from the following:    Height as of this encounter: 1.702 m (5' 7\").    Weight as of this encounter: 92.4 kg (203 lb 12.8 oz).       Follow-ups Needed After Discharge   Follow-up Appointments       Follow-up and recommended labs and tests       Follow up with Dr. Lucian Castano on 12/13 at 840              Discharge Disposition   Discharged to home  Condition at discharge: Stable    Hospital Course   Ammon Harding is a 74 year old male admitted on 12/3/2024. He has a history of stroke with chronic left hemiplegia. He has acute worsening weakness and tested positive for influenza A.     Principal Problem:      Acute respiratory failure with hypoxia  Assessment: not present on admission, vomited overnight and likely aspirated. Initially requiring 4 liters of oxygen but this is being weaned. Chest xray confirms infiltrate. Improving. Off oxygen. Will discharge with 4 days of augmentin.       Influenza due to influenza virus, type A, human    Generalized muscle weakness  Assessment: present on admission, onset of symptoms within 48 hrs. Not hypoxic, hemodynamically stable. Infection has exacerbated his chronic stroke deficits and he now needs assistance getting out of bed or a chair. " Back to baseline physical abilities. Will discharge on 2 more days of Tamiflu.       Acute stroke due to embolism of basilar artery (H)  Assessment: present on admission, occurred in 2022. Chronic left sided weakness that is exacerbated by the infection. I do not this this is a new stroke.   Plan: continue aspirin, statin    Active Problems:    Benign essential hypertension  Assessment: chronic       Acute kidney injury     Chronic kidney disease, stage 2 (mild)  Assessment: present on admission, improved overnight      Pulmonary hypertension (H)  Assessment: chronic      Chronic systolic right heart failure (H)  Assessment: no evidence for heart failure exacerbation      Consultations This Hospital Stay   PHYSICAL THERAPY ADULT IP CONSULT  CARE MANAGEMENT / SOCIAL WORK IP CONSULT  OCCUPATIONAL THERAPY ADULT IP CONSULT  SOCIAL WORK IP CONSULT    Code Status   Full Code    Time Spent on this Encounter   I, Manish Calhoun MD, personally saw the patient today and spent greater than 30 minutes discharging this patient.       Manish Calhoun MD  [facility]  [address]  [facility] [address]  Phone: [phone]  Fax: [phone]  ______________________________________________________________________    Physical Exam   Vital Signs: Temp: 97.6  F (36.4  C) Temp src: Tympanic BP: (!) 169/83 Pulse: 74   Resp: 18 SpO2: 97 % O2 Device: None (Room air)    Weight: 203 lbs 12.8 oz  GENERAL: Comfortable, no apparent distress.  CARDIOVASCULAR: regular rate and rhythm, no murmur. No lower extremity edema   RESPIRATORY: Clear to auscultation bilaterally, no wheezes or crackles.  GI: non-tender, non-distended, normal bowel sounds.   SKIN: warm periphery, no rashes         Primary Care Physician   Av Duncan    Discharge Orders      Reason for your hospital stay    Influenza and aspiration pneumonia     Follow-up and recommended labs and tests     Follow up with Dr. Lucian Castano on  12/13 at 840     Activity    Your activity upon discharge: activity as tolerated     Diet    Regular Diet Adult       Significant Results and Procedures   Most Recent 3 CBC's:  Recent Labs   Lab Test 12/05/24  0532 12/04/24  0701 12/03/24  1459   WBC 7.2 12.4* 4.9   HGB 14.0 14.6 16.1   MCV 95 94 95   * 134* 163     Most Recent 3 BMP's:  Recent Labs   Lab Test 12/05/24  0532 12/04/24  0702 12/03/24  1459    138 138   POTASSIUM 3.7 4.1 4.2   CHLORIDE 110* 104 102   CO2 22 24 28   BUN 24.7* 23.4* 18.2   CR 1.48* 1.92* 1.32*   ANIONGAP 10 10 8   MANISH 8.0* 8.2* 9.2   GLC 93 122* 97     Most Recent 2 LFT's:  Recent Labs   Lab Test 12/03/24  1459 12/21/22  0920   AST 41 53*   ALT 37 48   ALKPHOS 104 161*   BILITOTAL 0.5 0.4   ,   Results for orders placed or performed during the hospital encounter of 12/03/24   XR Chest Port 1 View    Narrative    PROCEDURE INFORMATION:   Exam: XR Chest   Exam date and time: 12/4/2024 1:11 AM   Age: 74 years old   Clinical indication: Other: Aspiration     TECHNIQUE:   Imaging protocol: Radiologic exam of the chest.   Views: 1 view.     COMPARISON:   No relevant prior studies available.     FINDINGS:   Lungs: Patchy right basilar infiltrate.   Pleural spaces: Unremarkable. No pleural effusion. No pneumothorax.   Heart/Mediastinum: Unremarkable.   Bones/joints: Unremarkable.       Impression    IMPRESSION:   Patchy right basilar infiltrate, suspicious for pneumonia.     THIS DOCUMENT HAS BEEN ELECTRONICALLY SIGNED BY MONICA GERMAIN MD       Discharge Medications   Current Discharge Medication List        START taking these medications    Details   amoxicillin-clavulanate (AUGMENTIN) 875-125 MG tablet Take 1 tablet by mouth 2 times daily for 4 days.  Qty: 8 tablet, Refills: 0    Associated Diagnoses: Acute respiratory failure with hypoxia (H); Aspiration pneumonia due to vomit, unspecified laterality, unspecified part of lung (H)      oseltamivir (TAMIFLU) 30 MG capsule Take 1  capsule (30 mg) by mouth 2 times daily for 2 days.  Qty: 4 capsule, Refills: 0    Associated Diagnoses: Acute respiratory failure with hypoxia (H); Influenza A           CONTINUE these medications which have NOT CHANGED    Details   acetaminophen (TYLENOL) 325 MG tablet Take 1-2 tablets (325-650 mg) by mouth every 4 hours as needed for mild pain    Associated Diagnoses: Chronic left shoulder pain      aspirin 81 MG EC tablet Take 81 mg by mouth daily      atorvastatin (LIPITOR) 80 MG tablet Take 1 tablet (80 mg) by mouth daily  Qty: 90 tablet, Refills: 3    Associated Diagnoses: Acute stroke due to embolism of basilar artery (H); Benign essential hypertension      baclofen (LIORESAL) 10 MG tablet Take 1 tablet by mouth 3 times daily.      finasteride (PROSCAR) 5 MG tablet Take 1 tablet (5 mg) by mouth daily  Qty: 90 tablet, Refills: 3    Associated Diagnoses: Benign prostatic hyperplasia without urinary obstruction      lisinopril (ZESTRIL) 10 MG tablet Take 1 tablet (10 mg) by mouth daily  Qty: 90 tablet, Refills: 3    Associated Diagnoses: Benign essential hypertension      omeprazole (PRILOSEC) 20 MG DR capsule Take 1 capsule (20 mg) by mouth daily  Qty: 90 capsule, Refills: 3    Associated Diagnoses: Oropharyngeal dysphagia      SENNA-docusate sodium (SENNA S) 8.6-50 MG tablet Take 1 tablet by mouth 2 times daily as needed (constipation)           Allergies   No Known Allergies

## 2024-12-06 NOTE — PROGRESS NOTES
12/06/24 1256   Appointment Info   Signing Clinician's Name / Credentials (PT) Kenny Rueda MPT   Therapeutic Activity   Treatment Detail/Skilled Intervention SBA for bed mobilities; sit to stand and stand pivot transfers with CGA and use of Fww (assist to position left hand onto walker today)   Gait Training   Symptoms Noted During/After Treatment (Gait Training) fatigue   Treatment Detail/Skilled Intervention ambulation within patient 's room   Distance in Feet 25 x 2   [place] Level (Gait Training) contact guard   Physical Assistance Level (Gait Training) supervision   Weight Bearing (Gait Training) full weight-bearing   Assistive Device (Gait Training) rolling walker   Pattern Analysis (Gait Training) swing-to gait   Gait Analysis Deviations decreased danny;decreased stride length;increased stride width   Impairments (Gait Analysis/Training) balance impaired;strength decreased   PT Discharge Planning   PT Plan Continue PT   PT Discharge Recommendation (DC Rec) home with assist;home with outpatient physical therapy   PT Rationale for DC Rec to promote strength, stability and safe mobility   PT Brief overview of current status CGA to SBA for all activities; patient now on room air; he will benefit from continued PT through outpatient services   PT Equipment Needed at Discharge walker, rolling

## 2024-12-06 NOTE — PROGRESS NOTES
[facility] DISCHARGE NOTE    Patient discharged to home at 1:51 PM via wheel chair. Accompanied by spouse and staff. Discharge instructions reviewed with patient and spouse, opportunity offered to ask questions. Prescriptions sent to patients preferred pharmacy. All belongings sent with patient.    Monica Persaud RN

## 2024-12-06 NOTE — PROGRESS NOTES
:    Patient is anticipated to discharge home today. Patient will continue with outpatient PT. No further needs from  at this time.     KANDICE Morales on 12/6/2024 at 11:57 AM

## 2024-12-06 NOTE — PROGRESS NOTES
SAFETY CHECKLIST  ID Bands and Risk clasps correct and in place (DNR, Fall risk, Allergy, Latex, Limb):  Yes  All Lines Reconciled and labeled correctly: Yes  Whiteboard updated:Yes  Environmental interventions: Yes  Verify Tele #: JUAN Donaldson RN on 12/5/2024 at 7:37 PM

## 2024-12-06 NOTE — PROGRESS NOTES
RT called to room for pt neb.  Pt had ex wheezed in bases and a loose cough. I asked pt if he had been using IS and he said very little..  IS instruct was performed again after neb was given.  RT him to use IS every hour.    Preeti Bhandari, RT on 12/5/2024 at 7:26 PM

## 2024-12-09 ENCOUNTER — PATIENT OUTREACH (OUTPATIENT)
Dept: FAMILY MEDICINE | Facility: OTHER | Age: 74
End: 2024-12-09
Payer: MEDICARE

## 2024-12-09 NOTE — TELEPHONE ENCOUNTER
Transitions of Care Outreach    Influenza and aspiration pneumonia       Most Recent Admission Date: 12/3/2024   Most Recent Admission Diagnosis:      Most Recent Discharge Date: 12/6/2024   Most Recent Discharge Diagnosis: Acute respiratory failure with hypoxia (H) - J96.01  Influenza A - J10.1  Aspiration pneumonia due to vomit, unspecified laterality, unspecified part of lung (H) - J69.0     Transitions of Care Assessment    Discharge Assessment  How are you doing now that you are home?: Was doing better last night - Woke this morning with some wheezing, was sent home wheezing  How are your symptoms? (Red Flag symptoms escalate to triage hotline per guidelines): Improved  Do you know how to contact your clinic care team if you have future questions or changes to your health status? : Yes  Does the patient have their discharge instructions? : Yes  Does the patient have questions regarding their discharge instructions? : No  Were you started on any new medications or were there changes to any of your previous medications? : Yes (Tamiflu & augmentin)  Does the patient have all of their medications?: Yes  Do you have questions regarding any of your medications? : No (Tamiflu done, 2 days left of augmentin)  Do you have all of your needed medical supplies or equipment (DME)?  (i.e. oxygen tank, CPAP, cane, etc.): Yes    Follow up Plan     Discharge Follow-Up  Discharge follow up appointment scheduled in alignment with recommended follow up timeframe or Transitions of Risk Category? (Low = within 30 days; Moderate= within 14 days; High= within 7 days): Yes  Discharge Follow Up Appointment Date: 12/13/24  Discharge Follow Up Appointment Scheduled with?: Primary Care Provider    Future Appointments   Date Time Provider Department Center   12/13/2024 10:40 AM Lucian Castano MD [facility]       Outpatient Plan as outlined on AVS reviewed with patient.    For any urgent concerns, please contact our 24 hour  nurse triage line: [phone] ([phone])       Nereida Singleton RN

## 2024-12-13 ENCOUNTER — OFFICE VISIT (OUTPATIENT)
Dept: FAMILY MEDICINE | Facility: OTHER | Age: 74
End: 2024-12-13
Attending: FAMILY MEDICINE
Payer: MEDICARE

## 2024-12-13 VITALS
TEMPERATURE: 98.2 F | DIASTOLIC BLOOD PRESSURE: 78 MMHG | SYSTOLIC BLOOD PRESSURE: 148 MMHG | OXYGEN SATURATION: 94 % | WEIGHT: 204 LBS | RESPIRATION RATE: 20 BRPM | HEART RATE: 110 BPM | BODY MASS INDEX: 32.02 KG/M2 | HEIGHT: 67 IN

## 2024-12-13 DIAGNOSIS — N17.9 AKI (ACUTE KIDNEY INJURY) (H): ICD-10-CM

## 2024-12-13 DIAGNOSIS — J10.1 INFLUENZA A: Primary | ICD-10-CM

## 2024-12-13 DIAGNOSIS — I50.812 CHRONIC SYSTOLIC RIGHT HEART FAILURE (H): ICD-10-CM

## 2024-12-13 DIAGNOSIS — J69.0 ASPIRATION PNEUMONIA OF RIGHT LOWER LOBE DUE TO VOMIT (H): ICD-10-CM

## 2024-12-13 DIAGNOSIS — I27.20 PULMONARY HYPERTENSION (H): ICD-10-CM

## 2024-12-13 LAB
ANION GAP SERPL CALCULATED.3IONS-SCNC: 5 MMOL/L (ref 7–15)
BUN SERPL-MCNC: 19.6 MG/DL (ref 8–23)
CALCIUM SERPL-MCNC: 9.4 MG/DL (ref 8.8–10.4)
CHLORIDE SERPL-SCNC: 106 MMOL/L (ref 98–107)
CREAT SERPL-MCNC: 1.1 MG/DL (ref 0.67–1.17)
EGFRCR SERPLBLD CKD-EPI 2021: 70 ML/MIN/1.73M2
GLUCOSE SERPL-MCNC: 126 MG/DL (ref 70–99)
HCO3 SERPL-SCNC: 28 MMOL/L (ref 22–29)
HOLD SPECIMEN: NORMAL
POTASSIUM SERPL-SCNC: 4.6 MMOL/L (ref 3.4–5.3)
SODIUM SERPL-SCNC: 139 MMOL/L (ref 135–145)

## 2024-12-13 PROCEDURE — G0463 HOSPITAL OUTPT CLINIC VISIT: HCPCS

## 2024-12-13 PROCEDURE — 80048 BASIC METABOLIC PNL TOTAL CA: CPT | Mod: ZL | Performed by: FAMILY MEDICINE

## 2024-12-13 PROCEDURE — 36415 COLL VENOUS BLD VENIPUNCTURE: CPT | Mod: ZL | Performed by: FAMILY MEDICINE

## 2024-12-13 ASSESSMENT — PAIN SCALES - GENERAL: PAINLEVEL_OUTOF10: NO PAIN (0)

## 2024-12-13 NOTE — PROGRESS NOTES
"  Assessment & Plan     (J10.1) Influenza A  (primary encounter diagnosis)  Comment: Patient recently hospitalized for influenza A.  He feels he is 75% improved and continues to gradually improve.  No recurrent fevers or worsening shortness of breath.  Suggest he continue to do incentive spirometry and monitor for any signs or symptoms of decompensation  Plan: Basic Metabolic Panel    (I50.812) Chronic systolic right heart failure (H)  Comment: Patient currently appears euvolemic    (I27.20) Pulmonary hypertension (H)  Comment: Chronic, stable    (J69.0) Aspiration pneumonia of right lower lobe due to vomit (H)  Comment: Patient was placed on Augmentin which did cause diarrhea but now is improving with being off antibiotics and initiating yogurt.    (N17.9) Acute kidney injury  Comment: Patient's baseline creatinine is around 1.  In the hospital he peaked at 1.92 prior to improving prior to discharge.  We checked a BMP today and his kidney function has now returned back to normal.        MED REC REQUIRED  Post Medication Reconciliation Status:     Nicotine/Tobacco Cessation  He reports that he has been smoking pipe. He has never used smokeless tobacco.  Nicotine/Tobacco Cessation Plan  Information offered: Patient not interested at this time      BMI  Estimated body mass index is 31.95 kg/m  as calculated from the following:    Height as of this encounter: 1.702 m (5' 7\").    Weight as of this encounter: 92.5 kg (204 lb).             No follow-ups on file.    Ayla Verde is a 74 year old, presenting for the following health issues:  Hospital F/U      12/13/2024    10:25 AM   Additional Questions   Roomed by FAB Schimdt   Accompanied by spouse       History of Present Illness       Reason for visit:  Hospital follow up    He eats 2-3 servings of fruits and vegetables daily.He consumes 2 sweetened beverage(s) daily.He exercises with enough effort to increase his heart rate 9 or less minutes per day.  He " "exercises with enough effort to increase his heart rate 3 or less days per week.   He is taking medications regularly.          Hospital Follow-up Visit:    Patient presents after being hospitalized for 3 nights due to influenza A.  Patient initially presented with anorexia, cough, fever and weakness.  He has history of left sided weakness due to a stroke in 2022 and wife noticed that he was not able to get up or ambulate on his own the morning prior to admission.      Patient had an episode of aspiration pneumonia after vomiting while in the hospital.  Initially required oxygen supplementation.  Was started on Zosyn and transition to Augmentin on discharge.    Since being home he has had diarrhea but this is now improving as he has been off antibiotics for a couple of days.  His wife started him on some yogurt and that seems to be helping.    He tells me he is \"75% better\".  Still has a cough but it is nonproductive.  He feels like he is breathing better and coughing deeper.  Still feels a little tired but this is also gradually improving.  He has not had any fevers or chills.  He is doing incentive spirometry.  No new issues.    Hospital/Nursing Home/IP Rehab Facility: [facility]  Date of Admission: 12/03/24  Date of Discharge: 12/06/24  Reason(s) for Admission: influenza A and pneumonia   Was the patient in the ICU or did the patient experience delirium during hospitalization?  No  Do you have any other stressors you would like to discuss with your provider? No    Problems taking medications regularly:  None  Medication changes since discharge: tamiflu and antibiotic but they are completed   Problems adhering to non-medication therapy:  None    Summary of hospitalization:  [facility] discharge summary reviewed  Diagnostic Tests/Treatments reviewed.  Follow up needed: none  Other Healthcare Providers Involved in Patient s Care:         None  Update since discharge: improved. " "      Results for orders placed or performed in visit on 12/13/24   Basic Metabolic Panel     Status: Abnormal   Result Value Ref Range    Sodium 139 135 - 145 mmol/L    Potassium 4.6 3.4 - 5.3 mmol/L    Chloride 106 98 - 107 mmol/L    Carbon Dioxide (CO2) 28 22 - 29 mmol/L    Anion Gap 5 (L) 7 - 15 mmol/L    Urea Nitrogen 19.6 8.0 - 23.0 mg/dL    Creatinine 1.10 0.67 - 1.17 mg/dL    GFR Estimate 70 >60 mL/min/1.73m2    Calcium 9.4 8.8 - 10.4 mg/dL    Glucose 126 (H) 70 - 99 mg/dL   Extra Tube     Status: None    Narrative    The following orders were created for panel order Extra Tube.  Procedure                               Abnormality         Status                     ---------                               -----------         ------                     Extra Purple Top Tube[[id number]]                            Final result                 Please view results for these tests on the individual orders.   Extra Purple Top Tube     Status: None   Result Value Ref Range    Hold Specimen [facility]          Plan of care communicated with patient and family                     Objective    BP (!) 150/74   Pulse 110   Temp 98.2  F (36.8  C) (Tympanic)   Resp 20   Ht 1.702 m (5' 7\")   Wt 92.5 kg (204 lb)   SpO2 94%   BMI 31.95 kg/m    Body mass index is 31.95 kg/m .  Physical Exam   GENERAL: alert and no distress  EYES: Eyes grossly normal to inspection, PERRL and conjunctivae and sclerae normal  RESP: Initially had some rhonchi at the right lower base but after a small cough cleared and then both lung fields were clear to auscultation bilaterally.  CV: rrr.  No edema  MS: Chronic left-sided weakness  PSYCH: mentation appears normal, affect normal/bright            Signed Electronically by: Lucian Castano MD    "

## 2024-12-13 NOTE — NURSING NOTE
"Chief Complaint   Patient presents with    Hospital F/U       Initial BP (!) 150/74   Pulse 110   Temp 98.2  F (36.8  C) (Tympanic)   Resp 20   Ht 1.702 m (5' 7\")   Wt 92.5 kg (204 lb)   SpO2 94%   BMI 31.95 kg/m   Estimated body mass index is 31.95 kg/m  as calculated from the following:    Height as of this encounter: 1.702 m (5' 7\").    Weight as of this encounter: 92.5 kg (204 lb).  Medication Review: complete    The next two questions are to help us understand your food security.  If you are feeling you need any assistance in this area, we have resources available to support you today.          12/3/2024   SDOH- Food Insecurity   Within the past 12 months, did you worry that your food would run out before you got money to buy more? N   Within the past 12 months, did the food you bought just not last and you didn t have money to get more? N            Health Care Directive:  Patient does not have a Health Care Directive: Discussed advance care planning with patient; information given to patient to review.    Roxana Nieves LPN      "

## 2024-12-13 NOTE — PATIENT INSTRUCTIONS
Try Kombucha.  I like the [employer] brand Makenzie Life yellow and pink.  I do not like the ones in the brown bottle.    Otherwise just keep doing the IS.  Let us know if you get any fevers or start coughing up brown/green/yellow stuff or if you start to feel short of breath/chest pain.    For your O2 sats, anything above 90% is great.  Even 88-89 if no symptoms is fine.    You can validate if it is picking up right by checking pulse.  Normal pulse is .

## 2025-03-30 ENCOUNTER — HOSPITAL ENCOUNTER (OUTPATIENT)
Dept: GENERAL RADIOLOGY | Facility: OTHER | Age: 75
Discharge: HOME | End: 2025-03-30
Attending: FAMILY MEDICINE
Payer: MEDICARE

## 2025-03-30 ENCOUNTER — OFFICE VISIT (OUTPATIENT)
Dept: FAMILY MEDICINE | Facility: OTHER | Age: 75
End: 2025-03-30
Payer: MEDICARE

## 2025-03-30 VITALS
DIASTOLIC BLOOD PRESSURE: 86 MMHG | BODY MASS INDEX: 32.26 KG/M2 | RESPIRATION RATE: 16 BRPM | HEART RATE: 79 BPM | WEIGHT: 206 LBS | OXYGEN SATURATION: 95 % | TEMPERATURE: 97.4 F | SYSTOLIC BLOOD PRESSURE: 130 MMHG

## 2025-03-30 DIAGNOSIS — R05.2 SUBACUTE COUGH: Primary | ICD-10-CM

## 2025-03-30 DIAGNOSIS — R05.2 SUBACUTE COUGH: ICD-10-CM

## 2025-03-30 DIAGNOSIS — J18.9 PNEUMONIA OF RIGHT LOWER LOBE DUE TO INFECTIOUS ORGANISM: ICD-10-CM

## 2025-03-30 PROCEDURE — 99214 OFFICE O/P EST MOD 30 MIN: CPT | Performed by: FAMILY MEDICINE

## 2025-03-30 PROCEDURE — G0463 HOSPITAL OUTPT CLINIC VISIT: HCPCS | Mod: 25

## 2025-03-30 PROCEDURE — 71046 X-RAY EXAM CHEST 2 VIEWS: CPT

## 2025-03-30 PROCEDURE — 3075F SYST BP GE 130 - 139MM HG: CPT | Performed by: FAMILY MEDICINE

## 2025-03-30 PROCEDURE — 3079F DIAST BP 80-89 MM HG: CPT | Performed by: FAMILY MEDICINE

## 2025-03-30 PROCEDURE — 1126F AMNT PAIN NOTED NONE PRSNT: CPT | Performed by: FAMILY MEDICINE

## 2025-03-30 RX ORDER — AZITHROMYCIN 250 MG/1
TABLET, FILM COATED ORAL
Qty: 6 TABLET | Refills: 0 | Status: SHIPPED | OUTPATIENT
Start: 2025-03-30 | End: 2025-04-04

## 2025-03-30 ASSESSMENT — PAIN SCALES - GENERAL: PAINLEVEL_OUTOF10: NO PAIN (0)

## 2025-03-30 ASSESSMENT — ENCOUNTER SYMPTOMS: COUGH: 1

## 2025-03-30 NOTE — PROGRESS NOTES
SUBJECTIVE:   Ammon Harding is a 74 year old male who presents to clinic today for the following health issues:    Cough      Patient arrives here for cough.  Is been going on for couple months.  Seem to improve after COVID but then 2 to 3 weeks ago reoccurred.  He did take a home test for COVID.  He has decreased energy.  No fevers or chills.  At Time cough is productive.    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         Oropharyngeal dysphagia 12/15/2022     Priority: Medium    Chronic left shoulder pain 12/15/2022     Priority: Medium     Since CVA      H/O adenomatous polyp of colon 03/16/2021     Priority:  Medium    Benign prostatic hyperplasia without urinary obstruction 03/16/2020     Priority: Medium    Prostate nodule 02/17/2020     Priority: Medium    Chronic kidney disease, stage 2 (mild) 10/16/2019     Priority: Medium    Other hyperlipidemia 10/16/2019     Priority: Medium    Osteoarthritis 06/25/2019     Priority: Medium    Deviated nasal septum 06/25/2019     Priority: Medium    Benign essential hypertension 07/30/2018     Priority: Medium    Elevated PSA 02/16/2018     Priority: Medium     Overview:   psa 5.1      Digital mucous cyst of toe of right foot 02/16/2018     Priority: Medium     Overview:   left foot      History of prostate biopsy 10/18/2017     Priority: Medium    Tinnitus 08/31/2015     Priority: Medium    Low back pain with sciatica 01/08/2014     Priority: Medium     Past Medical History:   Diagnosis Date    Acute cerebrovascular accident (CVA) due to embolism of basilar artery (H) 11/2022    Left hemiparesis    Elevated prostate specific antigen (PSA)     2106,psa 5.1    Hyperlipidemia     Hypertension     Osteoarthritis       Past Surgical History:   Procedure Laterality Date    ARTHROPLASTY KNEE Right 2017    ARTHROSCOPY KNEE Left 2012    COLONOS[place]  01[place]    COLONOSCOPY N/A 03/22/2021    F/U 2026 tubular adenomas    PROSTATE BIOPSY, NEEDLE, SATURATION SAMPLING  2017    PROSTATE BIOPSY, NEEDLE, SATURATION SAMPLING  05/2020    Benign    TONSILLECTOMY      No Comments Provided    VASECTOMY      No Comments Provided       Review of Systems   Respiratory:  Positive for cough.         OBJECTIVE:     /86 (BP Location: Right arm, Patient Position: Sitting, Cuff Size: Adult Regular)   Pulse 79   Temp 97.4  F (36.3  C) (Temporal)   Resp 16   Wt 93.4 kg (206 lb)   SpO2 95%   BMI 32.26 kg/m    Body mass index is 32.26 kg/m .  Physical Exam  Constitutional:       Appearance: Normal appearance.   HENT:      Head: Normocephalic.      Nose: No congestion or rhinorrhea.    Cardiovascular:      Rate and Rhythm: Normal rate.   Pulmonary:      Effort: Pulmonary effort is normal. No respiratory distress.      Breath sounds: No wheezing or rhonchi.   Neurological:      Mental Status: He is alert.         Diagnostic Test Results:  none     ASSESSMENT/PLAN:         (R05.2) Subacute cough  (primary encounter diagnosis)  Comment:   Plan: XR Chest 2 Views, CANCELED: XR Chest 2 Views            (J18.9) Pneumonia of right lower lobe due to infectious organism  Comment:   Plan: azithromycin (ZITHROMAX) 250 MG tablet        X-rays reviewed by me showing infiltrate in the right lower lung field.  Will start Zithromax.  Follow-up if not improving.      Arthur Stanton MD  [facility] AND \[facility] [place] [facility]\""

## 2025-03-30 NOTE — NURSING NOTE
Patient state that he has had a cough for about 2 months.  Started when he had COVID.  Felt he was getting better, but turned south.  Unproductive cough

## 2025-05-09 ENCOUNTER — TRANSFERRED RECORDS (OUTPATIENT)
Dept: MULTI SPECIALTY CLINIC | Facility: CLINIC | Age: 75
End: 2025-05-09

## 2025-05-09 LAB
ALT SERPL-CCNC: 22 U/L (ref 0–55)
AST SERPL-CCNC: 28 U/L (ref 5–40)
CHOLESTEROL (EXTERNAL): 117 MG/DL
CREATININE (EXTERNAL): 1 MG/DL (ref 0.5–1.5)
GFR ESTIMATED (EXTERNAL): 78 ML/MIN/{1.73M2}
GLUCOSE (EXTERNAL): 96 MG/DL (ref 70–180)
HDLC SERPL-MCNC: 47 MG/DL
LDL CHOLESTEROL CALCULATED (EXTERNAL): 55 MG/DL
POTASSIUM (EXTERNAL): 4.1 MMOL/L (ref 3.5–5)
TRIGLYCERIDES (EXTERNAL): 76 MG/DL

## 2025-06-07 ENCOUNTER — HEALTH MAINTENANCE LETTER (OUTPATIENT)
Age: 75
End: 2025-06-07

## (undated) DEVICE — ENDO BRUSH CHANNEL MASTER CLEANING 2-4.2MM BW-412T

## (undated) DEVICE — TUBING SUCTION 10'X3/16" N510

## (undated) DEVICE — ENDO KIT COMPLIANCE DYKENDOCMPLY

## (undated) DEVICE — SUCTION MANIFOLD NEPTUNE 2 SYS 4 PORT 0702-020-000

## (undated) DEVICE — ESU GROUND PAD ADULT W/CORD E7507

## (undated) DEVICE — ESU ENDO FORCEP BX HOT FD-210U

## (undated) DEVICE — SOL WATER 1500ML

## (undated) RX ORDER — CEFTRIAXONE SODIUM 1 G
VIAL (EA) INJECTION
Status: DISPENSED
Start: 2023-02-14

## (undated) RX ORDER — LIDOCAINE HYDROCHLORIDE 10 MG/ML
INJECTION, SOLUTION INFILTRATION; PERINEURAL
Status: DISPENSED
Start: 2022-07-19

## (undated) RX ORDER — LIDOCAINE HYDROCHLORIDE 10 MG/ML
INJECTION, SOLUTION EPIDURAL; INFILTRATION; INTRACAUDAL; PERINEURAL
Status: DISPENSED
Start: 2019-03-18

## (undated) RX ORDER — FENTANYL CITRATE 50 UG/ML
INJECTION, SOLUTION INTRAMUSCULAR; INTRAVENOUS
Status: DISPENSED
Start: 2022-11-10

## (undated) RX ORDER — LIDOCAINE HYDROCHLORIDE 10 MG/ML
INJECTION, SOLUTION EPIDURAL; INFILTRATION; INTRACAUDAL; PERINEURAL
Status: DISPENSED
Start: 2022-07-19

## (undated) RX ORDER — LIDOCAINE HYDROCHLORIDE 10 MG/ML
INJECTION, SOLUTION EPIDURAL; INFILTRATION; INTRACAUDAL; PERINEURAL
Status: DISPENSED
Start: 2019-01-07

## (undated) RX ORDER — DEXAMETHASONE SODIUM PHOSPHATE 10 MG/ML
INJECTION, SOLUTION INTRAMUSCULAR; INTRAVENOUS
Status: DISPENSED
Start: 2022-07-19

## (undated) RX ORDER — METHYLPREDNISOLONE ACETATE 80 MG/ML
INJECTION, SUSPENSION INTRA-ARTICULAR; INTRALESIONAL; INTRAMUSCULAR; SOFT TISSUE
Status: DISPENSED
Start: 2019-01-07

## (undated) RX ORDER — METHYLPREDNISOLONE ACETATE 80 MG/ML
INJECTION, SUSPENSION INTRA-ARTICULAR; INTRALESIONAL; INTRAMUSCULAR; SOFT TISSUE
Status: DISPENSED
Start: 2019-03-18

## (undated) RX ORDER — ONDANSETRON 2 MG/ML
INJECTION INTRAMUSCULAR; INTRAVENOUS
Status: DISPENSED
Start: 2022-11-10

## (undated) RX ORDER — LIDOCAINE HYDROCHLORIDE 10 MG/ML
INJECTION, SOLUTION INFILTRATION; PERINEURAL
Status: DISPENSED
Start: 2019-01-07

## (undated) RX ORDER — LIDOCAINE HYDROCHLORIDE 10 MG/ML
INJECTION, SOLUTION INFILTRATION; PERINEURAL
Status: DISPENSED
Start: 2019-03-18

## (undated) RX ORDER — LIDOCAINE HYDROCHLORIDE 10 MG/ML
INJECTION, SOLUTION INFILTRATION; PERINEURAL
Status: DISPENSED
Start: 2023-02-14

## (undated) RX ORDER — PROPOFOL 10 MG/ML
INJECTION, EMULSION INTRAVENOUS
Status: DISPENSED
Start: 2021-03-22